# Patient Record
Sex: MALE | Race: WHITE | NOT HISPANIC OR LATINO | Employment: OTHER | URBAN - METROPOLITAN AREA
[De-identification: names, ages, dates, MRNs, and addresses within clinical notes are randomized per-mention and may not be internally consistent; named-entity substitution may affect disease eponyms.]

---

## 2019-07-19 ENCOUNTER — APPOINTMENT (OUTPATIENT)
Dept: SPEECH THERAPY | Facility: REHABILITATION | Age: 69
End: 2019-07-19
Payer: COMMERCIAL

## 2019-07-24 ENCOUNTER — APPOINTMENT (OUTPATIENT)
Dept: SPEECH THERAPY | Facility: REHABILITATION | Age: 69
End: 2019-07-24
Payer: COMMERCIAL

## 2019-08-14 ENCOUNTER — APPOINTMENT (OUTPATIENT)
Dept: SPEECH THERAPY | Facility: REHABILITATION | Age: 69
End: 2019-08-14
Payer: COMMERCIAL

## 2019-08-16 ENCOUNTER — APPOINTMENT (OUTPATIENT)
Dept: SPEECH THERAPY | Facility: REHABILITATION | Age: 69
End: 2019-08-16
Payer: COMMERCIAL

## 2020-07-17 ENCOUNTER — HOSPITAL ENCOUNTER (OUTPATIENT)
Facility: MEDICAL CENTER | Age: 70
DRG: 871 | End: 2020-07-17
Payer: MEDICARE

## 2020-07-17 ENCOUNTER — HOSPITAL ENCOUNTER (INPATIENT)
Facility: MEDICAL CENTER | Age: 70
LOS: 21 days | DRG: 871 | End: 2020-08-07
Payer: MEDICARE

## 2020-07-17 DIAGNOSIS — T17.800A ASPIRATION INTO LOWER RESPIRATORY TRACT, INITIAL ENCOUNTER: ICD-10-CM

## 2020-07-17 DIAGNOSIS — J96.01 ACUTE RESPIRATORY FAILURE WITH HYPOXIA (HCC): ICD-10-CM

## 2020-07-17 DIAGNOSIS — N17.9 AKI (ACUTE KIDNEY INJURY) (HCC): ICD-10-CM

## 2020-07-17 DIAGNOSIS — E87.20 LACTIC ACIDOSIS: ICD-10-CM

## 2020-07-17 PROBLEM — D72.829 LEUKOCYTOSIS: Status: ACTIVE | Noted: 2020-07-17

## 2020-07-17 PROBLEM — J96.90 RESPIRATORY FAILURE (HCC): Status: ACTIVE | Noted: 2020-07-17

## 2020-07-17 PROBLEM — K56.609 SBO (SMALL BOWEL OBSTRUCTION) (HCC): Status: ACTIVE | Noted: 2020-07-17

## 2020-07-17 LAB
ALBUMIN SERPL BCP-MCNC: 3.3 G/DL (ref 3.2–4.9)
ALBUMIN/GLOB SERPL: 1.1 G/DL
ALP SERPL-CCNC: 49 U/L (ref 30–99)
ALT SERPL-CCNC: 17 U/L (ref 2–50)
ANION GAP SERPL CALC-SCNC: 13 MMOL/L (ref 7–16)
APTT PPP: 32.5 SEC (ref 24.7–36)
AST SERPL-CCNC: 25 U/L (ref 12–45)
BASOPHILS # BLD AUTO: 0.2 % (ref 0–1.8)
BASOPHILS # BLD: 0.04 K/UL (ref 0–0.12)
BILIRUB SERPL-MCNC: 0.3 MG/DL (ref 0.1–1.5)
BUN SERPL-MCNC: 26 MG/DL (ref 8–22)
CALCIUM SERPL-MCNC: 8.8 MG/DL (ref 8.5–10.5)
CHLORIDE SERPL-SCNC: 102 MMOL/L (ref 96–112)
CO2 SERPL-SCNC: 25 MMOL/L (ref 20–33)
CORTIS SERPL-MCNC: 20.2 UG/DL (ref 0–23)
CREAT SERPL-MCNC: 1.36 MG/DL (ref 0.5–1.4)
EOSINOPHIL # BLD AUTO: 0.01 K/UL (ref 0–0.51)
EOSINOPHIL NFR BLD: 0.1 % (ref 0–6.9)
ERYTHROCYTE [DISTWIDTH] IN BLOOD BY AUTOMATED COUNT: 55.3 FL (ref 35.9–50)
GLOBULIN SER CALC-MCNC: 2.9 G/DL (ref 1.9–3.5)
GLUCOSE BLD-MCNC: 83 MG/DL (ref 65–99)
GLUCOSE SERPL-MCNC: 113 MG/DL (ref 65–99)
HCT VFR BLD AUTO: 43.7 % (ref 42–52)
HGB BLD-MCNC: 13.7 G/DL (ref 14–18)
IMM GRANULOCYTES # BLD AUTO: 0.2 K/UL (ref 0–0.11)
IMM GRANULOCYTES NFR BLD AUTO: 1 % (ref 0–0.9)
INR PPP: 1.19 (ref 0.87–1.13)
LACTATE BLD-SCNC: 2.7 MMOL/L (ref 0.5–2)
LYMPHOCYTES # BLD AUTO: 0.94 K/UL (ref 1–4.8)
LYMPHOCYTES NFR BLD: 4.8 % (ref 22–41)
MAGNESIUM SERPL-MCNC: 2.3 MG/DL (ref 1.5–2.5)
MCH RBC QN AUTO: 30 PG (ref 27–33)
MCHC RBC AUTO-ENTMCNC: 31.4 G/DL (ref 33.7–35.3)
MCV RBC AUTO: 95.8 FL (ref 81.4–97.8)
MONOCYTES # BLD AUTO: 0.75 K/UL (ref 0–0.85)
MONOCYTES NFR BLD AUTO: 3.8 % (ref 0–13.4)
NEUTROPHILS # BLD AUTO: 17.77 K/UL (ref 1.82–7.42)
NEUTROPHILS NFR BLD: 90.1 % (ref 44–72)
NRBC # BLD AUTO: 0 K/UL
NRBC BLD-RTO: 0 /100 WBC
NT-PROBNP SERPL IA-MCNC: 323 PG/ML (ref 0–125)
PLATELET # BLD AUTO: 205 K/UL (ref 164–446)
PMV BLD AUTO: 11.1 FL (ref 9–12.9)
POTASSIUM SERPL-SCNC: 4.5 MMOL/L (ref 3.6–5.5)
PROT SERPL-MCNC: 6.2 G/DL (ref 6–8.2)
PROTHROMBIN TIME: 15.5 SEC (ref 12–14.6)
RBC # BLD AUTO: 4.56 M/UL (ref 4.7–6.1)
SODIUM SERPL-SCNC: 140 MMOL/L (ref 135–145)
TROPONIN T SERPL-MCNC: 44 NG/L (ref 6–19)
TSH SERPL DL<=0.005 MIU/L-ACNC: 1.95 UIU/ML (ref 0.38–5.33)
WBC # BLD AUTO: 19.7 K/UL (ref 4.8–10.8)

## 2020-07-17 PROCEDURE — 700105 HCHG RX REV CODE 258: Performed by: INTERNAL MEDICINE

## 2020-07-17 PROCEDURE — 84484 ASSAY OF TROPONIN QUANT: CPT

## 2020-07-17 PROCEDURE — 87040 BLOOD CULTURE FOR BACTERIA: CPT | Mod: 91

## 2020-07-17 PROCEDURE — 83605 ASSAY OF LACTIC ACID: CPT

## 2020-07-17 PROCEDURE — 83735 ASSAY OF MAGNESIUM: CPT

## 2020-07-17 PROCEDURE — 85025 COMPLETE CBC W/AUTO DIFF WBC: CPT

## 2020-07-17 PROCEDURE — 87641 MR-STAPH DNA AMP PROBE: CPT

## 2020-07-17 PROCEDURE — 99291 CRITICAL CARE FIRST HOUR: CPT | Performed by: INTERNAL MEDICINE

## 2020-07-17 PROCEDURE — 80053 COMPREHEN METABOLIC PANEL: CPT

## 2020-07-17 PROCEDURE — 93005 ELECTROCARDIOGRAM TRACING: CPT | Performed by: INTERNAL MEDICINE

## 2020-07-17 PROCEDURE — 85610 PROTHROMBIN TIME: CPT

## 2020-07-17 PROCEDURE — 84145 PROCALCITONIN (PCT): CPT

## 2020-07-17 PROCEDURE — 83880 ASSAY OF NATRIURETIC PEPTIDE: CPT

## 2020-07-17 PROCEDURE — 700111 HCHG RX REV CODE 636 W/ 250 OVERRIDE (IP): Performed by: INTERNAL MEDICINE

## 2020-07-17 PROCEDURE — 770022 HCHG ROOM/CARE - ICU (200)

## 2020-07-17 PROCEDURE — 700102 HCHG RX REV CODE 250 W/ 637 OVERRIDE(OP): Performed by: INTERNAL MEDICINE

## 2020-07-17 PROCEDURE — 85730 THROMBOPLASTIN TIME PARTIAL: CPT

## 2020-07-17 PROCEDURE — 82962 GLUCOSE BLOOD TEST: CPT

## 2020-07-17 PROCEDURE — 84443 ASSAY THYROID STIM HORMONE: CPT

## 2020-07-17 PROCEDURE — 82533 TOTAL CORTISOL: CPT

## 2020-07-17 PROCEDURE — 94640 AIRWAY INHALATION TREATMENT: CPT

## 2020-07-17 RX ORDER — BISACODYL 10 MG
10 SUPPOSITORY, RECTAL RECTAL
Status: DISCONTINUED | OUTPATIENT
Start: 2020-07-17 | End: 2020-07-18

## 2020-07-17 RX ORDER — AMOXICILLIN 250 MG
2 CAPSULE ORAL 2 TIMES DAILY
Status: DISCONTINUED | OUTPATIENT
Start: 2020-07-17 | End: 2020-07-18

## 2020-07-17 RX ORDER — LABETALOL HYDROCHLORIDE 5 MG/ML
10 INJECTION, SOLUTION INTRAVENOUS EVERY 4 HOURS PRN
Status: DISCONTINUED | OUTPATIENT
Start: 2020-07-17 | End: 2020-08-07 | Stop reason: HOSPADM

## 2020-07-17 RX ORDER — ONDANSETRON 4 MG/1
4 TABLET, ORALLY DISINTEGRATING ORAL EVERY 4 HOURS PRN
Status: DISCONTINUED | OUTPATIENT
Start: 2020-07-17 | End: 2020-07-18

## 2020-07-17 RX ORDER — SODIUM CHLORIDE, SODIUM LACTATE, POTASSIUM CHLORIDE, CALCIUM CHLORIDE 600; 310; 30; 20 MG/100ML; MG/100ML; MG/100ML; MG/100ML
INJECTION, SOLUTION INTRAVENOUS CONTINUOUS
Status: DISCONTINUED | OUTPATIENT
Start: 2020-07-17 | End: 2020-07-19

## 2020-07-17 RX ORDER — DEXTROSE MONOHYDRATE 25 G/50ML
50 INJECTION, SOLUTION INTRAVENOUS
Status: DISCONTINUED | OUTPATIENT
Start: 2020-07-17 | End: 2020-07-28

## 2020-07-17 RX ORDER — POLYETHYLENE GLYCOL 3350 17 G/17G
1 POWDER, FOR SOLUTION ORAL
Status: DISCONTINUED | OUTPATIENT
Start: 2020-07-17 | End: 2020-07-18

## 2020-07-17 RX ORDER — ONDANSETRON 2 MG/ML
4 INJECTION INTRAMUSCULAR; INTRAVENOUS EVERY 4 HOURS PRN
Status: DISCONTINUED | OUTPATIENT
Start: 2020-07-17 | End: 2020-08-07 | Stop reason: HOSPADM

## 2020-07-17 RX ORDER — LINEZOLID 2 MG/ML
600 INJECTION, SOLUTION INTRAVENOUS EVERY 12 HOURS
Status: DISCONTINUED | OUTPATIENT
Start: 2020-07-17 | End: 2020-07-18

## 2020-07-17 RX ADMIN — SODIUM CHLORIDE, POTASSIUM CHLORIDE, SODIUM LACTATE AND CALCIUM CHLORIDE: 600; 310; 30; 20 INJECTION, SOLUTION INTRAVENOUS at 22:02

## 2020-07-17 RX ADMIN — LINEZOLID 600 MG: 600 INJECTION, SOLUTION INTRAVENOUS at 22:03

## 2020-07-17 RX ADMIN — PIPERACILLIN AND TAZOBACTAM 4.5 G: 4; .5 INJECTION, POWDER, LYOPHILIZED, FOR SOLUTION INTRAVENOUS; PARENTERAL at 22:01

## 2020-07-17 ASSESSMENT — ENCOUNTER SYMPTOMS
NAUSEA: 0
PALPITATIONS: 0
FEVER: 0
SPUTUM PRODUCTION: 0
ABDOMINAL PAIN: 0
CHILLS: 0
SHORTNESS OF BREATH: 1
DEPRESSION: 0
FOCAL WEAKNESS: 0
VOMITING: 0
HEADACHES: 0
COUGH: 1

## 2020-07-17 NOTE — PROGRESS NOTES
Received direct admit transfer request from [facility].  Sending Physician: Dr. Hancock  Specialist consulted: Dr. Doll  Diagnosis: Sepsis  Patient accepted by: Dr. Doll  Patient coming via: [facility]  ETA: TBD pending bed availibility  Nursing to notify Direct Admit On-Call Hospitalist and release orders when patient arrives.
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
## 2020-07-18 ENCOUNTER — APPOINTMENT (OUTPATIENT)
Dept: RADIOLOGY | Facility: MEDICAL CENTER | Age: 70
DRG: 871 | End: 2020-07-18
Attending: INTERNAL MEDICINE
Payer: MEDICARE

## 2020-07-18 PROBLEM — J96.01 ACUTE RESPIRATORY FAILURE WITH HYPOXIA (HCC): Status: ACTIVE | Noted: 2020-07-17

## 2020-07-18 LAB
ANION GAP SERPL CALC-SCNC: 9 MMOL/L (ref 7–16)
BASOPHILS # BLD AUTO: 0.3 % (ref 0–1.8)
BASOPHILS # BLD: 0.05 K/UL (ref 0–0.12)
BUN SERPL-MCNC: 18 MG/DL (ref 8–22)
CALCIUM SERPL-MCNC: 8.8 MG/DL (ref 8.5–10.5)
CHLORIDE SERPL-SCNC: 104 MMOL/L (ref 96–112)
CO2 SERPL-SCNC: 25 MMOL/L (ref 20–33)
COVID ORDER STATUS COVID19: NORMAL
CREAT SERPL-MCNC: 1 MG/DL (ref 0.5–1.4)
EKG IMPRESSION: NORMAL
EOSINOPHIL # BLD AUTO: 0.11 K/UL (ref 0–0.51)
EOSINOPHIL NFR BLD: 0.7 % (ref 0–6.9)
ERYTHROCYTE [DISTWIDTH] IN BLOOD BY AUTOMATED COUNT: 55 FL (ref 35.9–50)
GLUCOSE BLD-MCNC: 102 MG/DL (ref 65–99)
GLUCOSE BLD-MCNC: 76 MG/DL (ref 65–99)
GLUCOSE SERPL-MCNC: 84 MG/DL (ref 65–99)
GRAM STN SPEC: NORMAL
HCT VFR BLD AUTO: 42.4 % (ref 42–52)
HGB BLD-MCNC: 13.1 G/DL (ref 14–18)
IMM GRANULOCYTES # BLD AUTO: 0.08 K/UL (ref 0–0.11)
IMM GRANULOCYTES NFR BLD AUTO: 0.5 % (ref 0–0.9)
LYMPHOCYTES # BLD AUTO: 1.03 K/UL (ref 1–4.8)
LYMPHOCYTES NFR BLD: 7 % (ref 22–41)
MCH RBC QN AUTO: 29.5 PG (ref 27–33)
MCHC RBC AUTO-ENTMCNC: 30.9 G/DL (ref 33.7–35.3)
MCV RBC AUTO: 95.5 FL (ref 81.4–97.8)
MONOCYTES # BLD AUTO: 0.57 K/UL (ref 0–0.85)
MONOCYTES NFR BLD AUTO: 3.9 % (ref 0–13.4)
MRSA DNA SPEC QL NAA+PROBE: NORMAL
NEUTROPHILS # BLD AUTO: 12.9 K/UL (ref 1.82–7.42)
NEUTROPHILS NFR BLD: 87.6 % (ref 44–72)
NRBC # BLD AUTO: 0 K/UL
NRBC BLD-RTO: 0 /100 WBC
PLATELET # BLD AUTO: 194 K/UL (ref 164–446)
PMV BLD AUTO: 10.7 FL (ref 9–12.9)
POTASSIUM SERPL-SCNC: 4.1 MMOL/L (ref 3.6–5.5)
PROCALCITONIN SERPL-MCNC: 0.81 NG/ML
RBC # BLD AUTO: 4.44 M/UL (ref 4.7–6.1)
SARS-COV-2 RNA RESP QL NAA+PROBE: NOTDETECTED
SIGNIFICANT IND 70042: NORMAL
SIGNIFICANT IND 70042: NORMAL
SITE SITE: NORMAL
SITE SITE: NORMAL
SODIUM SERPL-SCNC: 138 MMOL/L (ref 135–145)
SOURCE SOURCE: NORMAL
SOURCE SOURCE: NORMAL
SPECIMEN SOURCE: NORMAL
WBC # BLD AUTO: 14.7 K/UL (ref 4.8–10.8)

## 2020-07-18 PROCEDURE — 93010 ELECTROCARDIOGRAM REPORT: CPT | Performed by: INTERNAL MEDICINE

## 2020-07-18 PROCEDURE — 87205 SMEAR GRAM STAIN: CPT

## 2020-07-18 PROCEDURE — C9803 HOPD COVID-19 SPEC COLLECT: HCPCS | Performed by: INTERNAL MEDICINE

## 2020-07-18 PROCEDURE — 700111 HCHG RX REV CODE 636 W/ 250 OVERRIDE (IP): Performed by: INTERNAL MEDICINE

## 2020-07-18 PROCEDURE — 87070 CULTURE OTHR SPECIMN AEROBIC: CPT

## 2020-07-18 PROCEDURE — 700105 HCHG RX REV CODE 258: Performed by: INTERNAL MEDICINE

## 2020-07-18 PROCEDURE — 99291 CRITICAL CARE FIRST HOUR: CPT | Performed by: INTERNAL MEDICINE

## 2020-07-18 PROCEDURE — 770022 HCHG ROOM/CARE - ICU (200)

## 2020-07-18 PROCEDURE — U0003 INFECTIOUS AGENT DETECTION BY NUCLEIC ACID (DNA OR RNA); SEVERE ACUTE RESPIRATORY SYNDROME CORONAVIRUS 2 (SARS-COV-2) (CORONAVIRUS DISEASE [COVID-19]), AMPLIFIED PROBE TECHNIQUE, MAKING USE OF HIGH THROUGHPUT TECHNOLOGIES AS DESCRIBED BY CMS-2020-01-R: HCPCS

## 2020-07-18 PROCEDURE — 71045 X-RAY EXAM CHEST 1 VIEW: CPT

## 2020-07-18 PROCEDURE — 94669 MECHANICAL CHEST WALL OSCILL: CPT

## 2020-07-18 PROCEDURE — 82962 GLUCOSE BLOOD TEST: CPT

## 2020-07-18 PROCEDURE — 80048 BASIC METABOLIC PNL TOTAL CA: CPT

## 2020-07-18 PROCEDURE — 85025 COMPLETE CBC W/AUTO DIFF WBC: CPT

## 2020-07-18 PROCEDURE — 94640 AIRWAY INHALATION TREATMENT: CPT

## 2020-07-18 PROCEDURE — 700101 HCHG RX REV CODE 250: Performed by: INTERNAL MEDICINE

## 2020-07-18 RX ORDER — POLYETHYLENE GLYCOL 3350 17 G/17G
1 POWDER, FOR SOLUTION ORAL
Status: DISCONTINUED | OUTPATIENT
Start: 2020-07-18 | End: 2020-07-21

## 2020-07-18 RX ORDER — AMOXICILLIN 250 MG
2 CAPSULE ORAL 2 TIMES DAILY
Status: DISCONTINUED | OUTPATIENT
Start: 2020-07-18 | End: 2020-07-21

## 2020-07-18 RX ORDER — ONDANSETRON 4 MG/1
4 TABLET, ORALLY DISINTEGRATING ORAL EVERY 4 HOURS PRN
Status: DISCONTINUED | OUTPATIENT
Start: 2020-07-18 | End: 2020-07-23

## 2020-07-18 RX ORDER — HEPARIN SODIUM 5000 [USP'U]/ML
5000 INJECTION, SOLUTION INTRAVENOUS; SUBCUTANEOUS EVERY 8 HOURS
Status: DISCONTINUED | OUTPATIENT
Start: 2020-07-18 | End: 2020-07-26

## 2020-07-18 RX ORDER — BISACODYL 10 MG
10 SUPPOSITORY, RECTAL RECTAL
Status: DISCONTINUED | OUTPATIENT
Start: 2020-07-18 | End: 2020-07-21

## 2020-07-18 RX ADMIN — PIPERACILLIN AND TAZOBACTAM 4.5 G: 4; .5 INJECTION, POWDER, LYOPHILIZED, FOR SOLUTION INTRAVENOUS; PARENTERAL at 05:24

## 2020-07-18 RX ADMIN — DEXTROSE MONOHYDRATE 50 ML: 25 INJECTION, SOLUTION INTRAVENOUS at 23:32

## 2020-07-18 RX ADMIN — PIPERACILLIN AND TAZOBACTAM 4.5 G: 4; .5 INJECTION, POWDER, LYOPHILIZED, FOR SOLUTION INTRAVENOUS; PARENTERAL at 20:01

## 2020-07-18 RX ADMIN — SODIUM CHLORIDE, POTASSIUM CHLORIDE, SODIUM LACTATE AND CALCIUM CHLORIDE: 600; 310; 30; 20 INJECTION, SOLUTION INTRAVENOUS at 11:01

## 2020-07-18 RX ADMIN — PIPERACILLIN AND TAZOBACTAM 4.5 G: 4; .5 INJECTION, POWDER, LYOPHILIZED, FOR SOLUTION INTRAVENOUS; PARENTERAL at 13:02

## 2020-07-18 RX ADMIN — SODIUM CHLORIDE, POTASSIUM CHLORIDE, SODIUM LACTATE AND CALCIUM CHLORIDE: 600; 310; 30; 20 INJECTION, SOLUTION INTRAVENOUS at 23:46

## 2020-07-18 RX ADMIN — LINEZOLID 600 MG: 600 INJECTION, SOLUTION INTRAVENOUS at 05:23

## 2020-07-18 RX ADMIN — DEXTROSE MONOHYDRATE 50 ML: 25 INJECTION, SOLUTION INTRAVENOUS at 12:48

## 2020-07-18 RX ADMIN — HEPARIN SODIUM 5000 UNITS: 5000 INJECTION, SOLUTION INTRAVENOUS; SUBCUTANEOUS at 13:02

## 2020-07-18 RX ADMIN — PIPERACILLIN AND TAZOBACTAM 4.5 G: 4; .5 INJECTION, POWDER, LYOPHILIZED, FOR SOLUTION INTRAVENOUS; PARENTERAL at 01:00

## 2020-07-18 RX ADMIN — HEPARIN SODIUM 5000 UNITS: 5000 INJECTION, SOLUTION INTRAVENOUS; SUBCUTANEOUS at 21:32

## 2020-07-18 ASSESSMENT — COPD QUESTIONNAIRES
COPD SCREENING SCORE: 5
HAVE YOU SMOKED AT LEAST 100 CIGARETTES IN YOUR ENTIRE LIFE: YES
DURING THE PAST 4 WEEKS HOW MUCH DID YOU FEEL SHORT OF BREATH: SOME OF THE TIME
DO YOU EVER COUGH UP ANY MUCUS OR PHLEGM?: NO/ONLY WITH OCCASIONAL COLDS OR INFECTIONS

## 2020-07-18 ASSESSMENT — ENCOUNTER SYMPTOMS
NAUSEA: 0
CHILLS: 0
FEVER: 0
WEAKNESS: 1
ABDOMINAL PAIN: 0
SHORTNESS OF BREATH: 1
VOMITING: 0

## 2020-07-18 ASSESSMENT — LIFESTYLE VARIABLES: EVER_SMOKED: YES
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
## 2020-07-18 NOTE — DIETARY
"Nutrition Support Assessment:  Day 1 of admit.  Johann Godoy is a 69 y.o. male with admitting DX of sepsis.     Current problem list:  1. Respiratory failure  2. Leukocytosis  3. KORIN (acute kidney injury)  4. Lactic acidosis  5. SBO (small bowel obstruction)     Assessment:  Estimated Nutritional Needs based on:   Height: 188 cm (6' 2\")  Weight: 91.5 kg (201 lb 11.5 oz)  Weight to Use in Calculations: 91.5 kg (201 lb 11.5 oz) - admit bed scale wt  Ideal Body Weight: 86.2 kg (190 lb)  Percent Ideal Body Weight: 106.2  Body mass index is 25.9 kg/m²., BMI classification: overweight    Calculation/Equation: MSJ x 1.2 = 2096 kcals/day  Total Calories / day: 2096 - 2288 (Calories / k - 25)  Total Grams Protein / day: 110 - 128 (Grams Protein / k.2 - 1.4)     Evaluation:   1. Transferred from outside facility with pneumonia and sepsis.  2. CT of abdomen/pelvis showed partial SBO.  3. Reported hx of CHF.  4. NGT to low intermittent suction.  5. TF consult received, awaiting cortrak placement and verification.  6. Glucose 113, BUN 26  7. Zosyn, Pericolace on the MAR.  8. Specialized, low volume tube feeding formula indicated to meet pt's estimated nutrition needs.     Malnutrition Risk: Unable to identify at this time.     Recommendations/Plan:  1. Once Cortrak placed and verified, start Impact Peptide 1.5 @ 25 mL/hr and advance per protocol to goal rate of 55 mL/hr, which provides 1980 kcals, 124 grams of protein and 1016 mL of free water per day.  2. Fluids per MD.  3. PO diet when safe/appropriate per SLP/MD and pt can adequately consume.    RD following.              "

## 2020-07-18 NOTE — ASSESSMENT & PLAN NOTE
Due to aspiration pneumonia/pneumonitis  CTA from OSH per report no PE, BB ATX versus infiltrate  Component of atelectasis on x-ray as well    7/18 COVID SARS CoV2 PCR negative  7/18 Sputum Cx gram stain GPC, Cx pending  7/20 started on scopolamine patch due to increased oral secretions  7/21 intubated, bronched - large amount purulent secretions  7/22 self-extubated  Since he has been on HFNC 100%, 60L/min    Plan;   Continue HFNC, keep sat >88% -> trial of Bipap 7/24-now off PIP, work of breathing and oxygenation improved  Transition to oxygen mask or nasal cannula when clinically appropriate  CTA chest reviewed, Echo with bubble study  DNR/DNI  Mobility, PT following  CT with dense RLL consolidation s/p PNA treatment -7 days Zosyn-bleed 7/24  Continue Hypertonic saline 7% and DuoNeb 4 times daily and as needed  Mobilize- incentive spirometry as oxygenation allows  Continue mucolytic's  Son Taurus discussing with family to come see him/face time since his dad would not want this level of care.

## 2020-07-18 NOTE — PROGRESS NOTES
2 RN skin check complete with Mary RENNER RN.   Devices in place two IVs, BP cuff, EKG leads, pulse ox, HFNC , non skid socks.  Skin assessed under devices. Blanchable redness on right elbow. Redness on bilateral ears from oxygen tubing (present on arrival), skin excoriation on bilateral buttocks with redness that is slow to catherine, right heel cracked and calloused, scabs present on shins.   The following interventions in place waffle mattress overlay, q2h turns, pillows in place to float heels and elbows, condom cath placed. Photos uploaded.

## 2020-07-18 NOTE — ASSESSMENT & PLAN NOTE
Creatinine normalized, continue serial BMP, normalized with time/hydration  CT abdomen pelvis from OSH did not mention hydronephrosis or obstruction.   Monitor urine output  Avoid nephrotoxins  Hep-Lock IV fluids

## 2020-07-18 NOTE — ASSESSMENT & PLAN NOTE
7/16 CT abdomen pelvis at OSH was personally reviewed and noted diffuse dilated small bowel.   Since admisison, abdomen has been soft, while on NG tube.   7/20 Started on tube feed trickle feed, tolerating at 10cc/hr.   7/21 increased tube feed to 20cc/hr -> stop 7/22    Plan:   Given he's very tenous with resp status, will hold tube feed for now -> patient removed cortrax -> restart TF 7/24   Patient pulled cortrak again late 7/26, replaced 7/27  Continue to discuss with family goals of care, patient is capable of discussing issues as well  Abdominal exam remains soft no hernia or tenderness    Check KUB 7/25 -> constipated  Daily suppository aggressive bowel regime  Mobilize as oxygenation allows, may need to put mask on top of high flow nasal cannula to facilitate  Starting p.o. 7/28, tolerating thickened liquids without GI symptoms

## 2020-07-18 NOTE — PROGRESS NOTES
Pt up to [facility] by transport team at 1915. Pt connected to monitor and MD Dominguez updated. Pt on 15L NRB satting 91%.

## 2020-07-18 NOTE — ASSESSMENT & PLAN NOTE
Multifactorial possible sepsis +/- respiratory distress +/- any transient ischemia from reported inguinal hernia   Resolved  Monitor for recurrence
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
## 2020-07-18 NOTE — ASSESSMENT & PLAN NOTE
?  Reactive VS inflammatory VS infectious VS demargination  Panculture  Broad-spectrum empiric antibiotics  Trend  Currently BP and HR WNL, afebrile, with hypoxic respiratory failure and concern of aspiration

## 2020-07-19 ENCOUNTER — HOSPITAL ENCOUNTER (OUTPATIENT)
Dept: RADIOLOGY | Facility: MEDICAL CENTER | Age: 70
End: 2020-07-19
Payer: MEDICARE

## 2020-07-19 ENCOUNTER — APPOINTMENT (OUTPATIENT)
Dept: RADIOLOGY | Facility: MEDICAL CENTER | Age: 70
DRG: 871 | End: 2020-07-19
Attending: INTERNAL MEDICINE
Payer: MEDICARE

## 2020-07-19 PROBLEM — Z71.89 GOALS OF CARE, COUNSELING/DISCUSSION: Status: ACTIVE | Noted: 2020-07-19

## 2020-07-19 LAB
ANION GAP SERPL CALC-SCNC: 10 MMOL/L (ref 7–16)
BASOPHILS # BLD AUTO: 0.4 % (ref 0–1.8)
BASOPHILS # BLD: 0.05 K/UL (ref 0–0.12)
BUN SERPL-MCNC: 16 MG/DL (ref 8–22)
CALCIUM SERPL-MCNC: 8.9 MG/DL (ref 8.5–10.5)
CHLORIDE SERPL-SCNC: 102 MMOL/L (ref 96–112)
CO2 SERPL-SCNC: 26 MMOL/L (ref 20–33)
CREAT SERPL-MCNC: 0.97 MG/DL (ref 0.5–1.4)
CRP SERPL HS-MCNC: 14.63 MG/DL (ref 0–0.75)
EOSINOPHIL # BLD AUTO: 0.19 K/UL (ref 0–0.51)
EOSINOPHIL NFR BLD: 1.5 % (ref 0–6.9)
ERYTHROCYTE [DISTWIDTH] IN BLOOD BY AUTOMATED COUNT: 53 FL (ref 35.9–50)
GLUCOSE BLD-MCNC: 103 MG/DL (ref 65–99)
GLUCOSE BLD-MCNC: 109 MG/DL (ref 65–99)
GLUCOSE BLD-MCNC: 116 MG/DL (ref 65–99)
GLUCOSE BLD-MCNC: 134 MG/DL (ref 65–99)
GLUCOSE BLD-MCNC: 142 MG/DL (ref 65–99)
GLUCOSE BLD-MCNC: 62 MG/DL (ref 65–99)
GLUCOSE BLD-MCNC: 65 MG/DL (ref 65–99)
GLUCOSE BLD-MCNC: 67 MG/DL (ref 65–99)
GLUCOSE BLD-MCNC: 75 MG/DL (ref 65–99)
GLUCOSE BLD-MCNC: 77 MG/DL (ref 65–99)
GLUCOSE BLD-MCNC: 81 MG/DL (ref 65–99)
GLUCOSE BLD-MCNC: 83 MG/DL (ref 65–99)
GLUCOSE BLD-MCNC: 87 MG/DL (ref 65–99)
GLUCOSE BLD-MCNC: 99 MG/DL (ref 65–99)
GLUCOSE SERPL-MCNC: 78 MG/DL (ref 65–99)
HCT VFR BLD AUTO: 40.9 % (ref 42–52)
HGB BLD-MCNC: 12.7 G/DL (ref 14–18)
IMM GRANULOCYTES # BLD AUTO: 0.06 K/UL (ref 0–0.11)
IMM GRANULOCYTES NFR BLD AUTO: 0.5 % (ref 0–0.9)
LACTATE BLD-SCNC: 0.8 MMOL/L (ref 0.5–2)
LYMPHOCYTES # BLD AUTO: 1.34 K/UL (ref 1–4.8)
LYMPHOCYTES NFR BLD: 10.7 % (ref 22–41)
MCH RBC QN AUTO: 29.4 PG (ref 27–33)
MCHC RBC AUTO-ENTMCNC: 31.1 G/DL (ref 33.7–35.3)
MCV RBC AUTO: 94.7 FL (ref 81.4–97.8)
MONOCYTES # BLD AUTO: 0.62 K/UL (ref 0–0.85)
MONOCYTES NFR BLD AUTO: 5 % (ref 0–13.4)
NEUTROPHILS # BLD AUTO: 10.22 K/UL (ref 1.82–7.42)
NEUTROPHILS NFR BLD: 81.9 % (ref 44–72)
NRBC # BLD AUTO: 0 K/UL
NRBC BLD-RTO: 0 /100 WBC
PLATELET # BLD AUTO: 191 K/UL (ref 164–446)
PMV BLD AUTO: 11 FL (ref 9–12.9)
POTASSIUM SERPL-SCNC: 4 MMOL/L (ref 3.6–5.5)
PREALB SERPL-MCNC: 9.2 MG/DL (ref 18–38)
RBC # BLD AUTO: 4.32 M/UL (ref 4.7–6.1)
SODIUM SERPL-SCNC: 138 MMOL/L (ref 135–145)
WBC # BLD AUTO: 12.5 K/UL (ref 4.8–10.8)

## 2020-07-19 PROCEDURE — 82962 GLUCOSE BLOOD TEST: CPT | Mod: 91

## 2020-07-19 PROCEDURE — 700111 HCHG RX REV CODE 636 W/ 250 OVERRIDE (IP): Performed by: INTERNAL MEDICINE

## 2020-07-19 PROCEDURE — 85025 COMPLETE CBC W/AUTO DIFF WBC: CPT

## 2020-07-19 PROCEDURE — 80048 BASIC METABOLIC PNL TOTAL CA: CPT

## 2020-07-19 PROCEDURE — 700105 HCHG RX REV CODE 258: Performed by: INTERNAL MEDICINE

## 2020-07-19 PROCEDURE — 83605 ASSAY OF LACTIC ACID: CPT

## 2020-07-19 PROCEDURE — 99291 CRITICAL CARE FIRST HOUR: CPT | Performed by: INTERNAL MEDICINE

## 2020-07-19 PROCEDURE — 94640 AIRWAY INHALATION TREATMENT: CPT

## 2020-07-19 PROCEDURE — 84134 ASSAY OF PREALBUMIN: CPT

## 2020-07-19 PROCEDURE — 94669 MECHANICAL CHEST WALL OSCILL: CPT

## 2020-07-19 PROCEDURE — 770022 HCHG ROOM/CARE - ICU (200)

## 2020-07-19 PROCEDURE — 86140 C-REACTIVE PROTEIN: CPT

## 2020-07-19 PROCEDURE — 700101 HCHG RX REV CODE 250: Performed by: INTERNAL MEDICINE

## 2020-07-19 RX ORDER — FUROSEMIDE 10 MG/ML
20 INJECTION INTRAMUSCULAR; INTRAVENOUS EVERY 12 HOURS
Status: DISCONTINUED | OUTPATIENT
Start: 2020-07-19 | End: 2020-07-19

## 2020-07-19 RX ORDER — FUROSEMIDE 10 MG/ML
20 INJECTION INTRAMUSCULAR; INTRAVENOUS EVERY 8 HOURS
Status: DISCONTINUED | OUTPATIENT
Start: 2020-07-19 | End: 2020-07-22

## 2020-07-19 RX ADMIN — PIPERACILLIN AND TAZOBACTAM 4.5 G: 4; .5 INJECTION, POWDER, LYOPHILIZED, FOR SOLUTION INTRAVENOUS; PARENTERAL at 05:29

## 2020-07-19 RX ADMIN — DEXTROSE MONOHYDRATE 50 ML: 25 INJECTION, SOLUTION INTRAVENOUS at 23:54

## 2020-07-19 RX ADMIN — HEPARIN SODIUM 5000 UNITS: 5000 INJECTION, SOLUTION INTRAVENOUS; SUBCUTANEOUS at 05:30

## 2020-07-19 RX ADMIN — DEXTROSE MONOHYDRATE 50 ML: 25 INJECTION, SOLUTION INTRAVENOUS at 05:25

## 2020-07-19 RX ADMIN — PIPERACILLIN AND TAZOBACTAM 4.5 G: 4; .5 INJECTION, POWDER, LYOPHILIZED, FOR SOLUTION INTRAVENOUS; PARENTERAL at 21:28

## 2020-07-19 RX ADMIN — FUROSEMIDE 20 MG: 10 INJECTION, SOLUTION INTRAMUSCULAR; INTRAVENOUS at 09:14

## 2020-07-19 RX ADMIN — FUROSEMIDE 20 MG: 10 INJECTION, SOLUTION INTRAMUSCULAR; INTRAVENOUS at 15:26

## 2020-07-19 RX ADMIN — PIPERACILLIN AND TAZOBACTAM 4.5 G: 4; .5 INJECTION, POWDER, LYOPHILIZED, FOR SOLUTION INTRAVENOUS; PARENTERAL at 12:24

## 2020-07-19 RX ADMIN — HEPARIN SODIUM 5000 UNITS: 5000 INJECTION, SOLUTION INTRAVENOUS; SUBCUTANEOUS at 15:26

## 2020-07-19 RX ADMIN — HEPARIN SODIUM 5000 UNITS: 5000 INJECTION, SOLUTION INTRAVENOUS; SUBCUTANEOUS at 21:28

## 2020-07-19 RX ADMIN — FUROSEMIDE 20 MG: 10 INJECTION, SOLUTION INTRAMUSCULAR; INTRAVENOUS at 21:28

## 2020-07-19 ASSESSMENT — ENCOUNTER SYMPTOMS
VOMITING: 0
WEAKNESS: 1
CHILLS: 0
SHORTNESS OF BREATH: 0
ABDOMINAL PAIN: 0
DIARRHEA: 0
NAUSEA: 0
FEVER: 0

## 2020-07-19 NOTE — ASSESSMENT & PLAN NOTE
Palliative care following  Son Taurus describes his dad typically avoid health care and wouldn't any of this current level of care current getting family to come in and say goodbye vs trying to get back to [facility] and then plan towards focusing on his comfort   DNR/DNI  If he was to worsen Taurus would like to transitioning to comfort measures  Family conference 7/28 father and son  SANTO completed/signed
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
## 2020-07-19 NOTE — CARE PLAN
Problem: Safety  Goal: Will remain free from injury  Note: Call light within reach, treaded socks in place, bed in lowest position and locked.  Hourly rounding in progress      Problem: Urinary Elimination:  Goal: Ability to reestablish a normal urinary elimination pattern will improve  Flowsheets (Taken 7/18/2020 2000)  Urinary Elimination: Incontinence  Note: Pt in continent. Condom cath in place. Wet linens changed

## 2020-07-19 NOTE — WOUND TEAM
[facility]  Inpatient Services  Initial Wound and Skin Care Evaluation    Admission Date: 7/17/2020     Last order of IP CONSULT TO WOUND CARE was found on 7/17/2020 from Hospital Encounter on 7/17/2020     HPI, PMH, SH: Reviewed    Unit where seen by Wound Team: [id number]     WOUND CONSULT/FOLLOW UP RELATED TO:  Bilateral ears, Bilateral heels, right elbow, penis, back and sacrum     Self Report / Pain Level:  Denies pain       OBJECTIVE:  Pt lying in ICU low airloss bed with waffle overlay in use. Pillows in use to offload pressure, heel mepilex to bilateral elbows. High flow tubing offloaded off of ears and at side of ears with square gray foam pads.     WOUND TYPE, LOCATION, CHARACTERISTICS (Pressure Injuries: location, stage, POA or date identified)  Wound 07/18/20 Pressure Injury Ear Right (Active)   Wound Image   07/19/20 1400   Site Assessment Red;Brown;Purple 07/19/20 1400   Periwound Assessment [place] 07/19/20 1400   Margins Attached edges;Defined edges 07/19/20 1400   Closure Adhesive bandage 07/19/20 1400   Drainage Amount None 07/19/20 1400   Treatments Cleansed;Site care;Offloading 07/19/20 1400   Wound Cleansing Normal Saline Irrigation 07/19/20 1400   Periwound Protectant Skin Protectant Wipes to Periwound 07/19/20 1400   Dressing Cleansing/Solutions Not Applicable 07/19/20 1400   Dressing Options Hydrocolloid Thin;Mepilex 07/19/20 1400   Dressing Changed New 07/19/20 1400   Dressing Status Clean;Dry;Intact 07/19/20 1400   Dressing Change/Treatment Frequency Every 72 hrs, and As Needed 07/19/20 1400   NEXT Dressing Change/Treatment Date 07/22/20 07/19/20 1400   NEXT Weekly Photo (Inpatient Only) 07/26/20 07/19/20 1400   Pressure Injury Stage DTPI 07/19/20 1400   Non-staged Wound Description Not applicable 07/19/20 1400   Wound Length (cm) 0.5 cm 07/19/20 1400   Wound Width (cm) 0.2 cm 07/19/20 1400   Wound Surface Area (cm^2) 0.1 cm^2 07/19/20 1400   Shape Circular 07/19/20 1400   Wound  Odor None 07/19/20 1400   Exposed Structures EMMA 07/19/20 1400   WOUND NURSE ONLY - Time Spent with Patient (mins) 75 07/19/20 1400   Number of days: 1       Wound 07/18/20 Pressure Injury Ear Left (Active)   Wound Image    07/19/20 1400   Site Assessment Red;Yellow 07/19/20 1400   Periwound Assessment Clean;Dry;Intact;Pink 07/19/20 1400   Margins Attached edges;Defined edges 07/19/20 1400   Closure Adhesive bandage 07/19/20 1400   Drainage Amount None 07/19/20 1400   Treatments Cleansed;Offloading;Site care 07/19/20 1400   Wound Cleansing Normal Saline Irrigation 07/19/20 1400   Periwound Protectant Skin Protectant Wipes to Periwound 07/19/20 1400   Dressing Cleansing/Solutions Not Applicable 07/19/20 1400   Dressing Options Hydrocolloid Thin;Mepilex 07/19/20 1400   Dressing Changed New 07/19/20 1400   Dressing Status Clean;Dry;Intact 07/19/20 1400   Dressing Change/Treatment Frequency Every 72 hrs, and As Needed 07/19/20 1400   NEXT Dressing Change/Treatment Date 07/22/20 07/19/20 1400   NEXT Weekly Photo (Inpatient Only) 07/26/20 07/19/20 1400   Pressure Injury Stage U 07/19/20 1400   Non-staged Wound Description Not applicable 07/19/20 1400   Wound Length (cm) 0.2 cm 07/19/20 1400   Wound Width (cm) 0.3 cm 07/19/20 1400   Wound Surface Area (cm^2) 0.06 cm^2 07/19/20 1400   Shape Circular 07/19/20 1400   Wound Odor None 07/19/20 1400   Exposed Structures EMMA 07/19/20 1400   Number of days: 1      Vascular:    HARDIK:   No results found.    Lab Values:    Lab Results   Component Value Date/Time    WBC 12.5 (H) 07/19/2020 04:30 AM    RBC 4.32 (L) 07/19/2020 04:30 AM    HEMOGLOBIN 12.7 (L) 07/19/2020 04:30 AM    HEMATOCRIT 40.9 (L) 07/19/2020 04:30 AM    CREACTPROT 14.63 (H) 07/19/2020 04:30 AM      Culture Results show:  No results found for this or any previous visit (from the past 720 hour(s)).    INTERVENTIONS BY WOUND TEAM:  Chart and images reviewed. This RN initially in to assess patient. Bedside RN and CIC tech  at bedside after just completing a bed bath. Pt refused further assessment at this time. This RN was able to assess pts penis which is red related to moisture and condom cath however no wounds are visible. Bedside RN obtained image and condom cath was applied. This RN then returned 2 hours later for assessment. Pt was agreeable to assessment. Bilateral ears assessed, measured and photographed. [place]ola-wounds were prepped with no sting skin barrier. A piece of thin hydrocolloid was applied to each would bed which was then secured in place with a thin strip of sacral mepilex. Bilateral cheeks already offloaded under high flow with mepilex and square gray foams already in use to high flow strap. Bilateral elbows then assessed. Right elbow has scar tissue which is quick to catherine with a small partial thickness wound visible. Pts bilateral heels then assessed, pt has a large callus to the right heel. A portion was peeled off. New images of the areas obtained. Heel mepilex to be changed every 72hrs. Bilateral toenails also photographed. Right toes have vianney horn like toenails and a consult for nail care was placed. This RN then attempted to assess backside as images of sacrum and back have been placed into chart. Pt refused assessment at this time. Per chart review no wounds are visible and pressure does not appear to be a factor but can't be excluded unless pt will allow assessment.     Interdisciplinary consultation: Patient, Bedside RN (Praveena),     EVALUATION: Pt is an older gentleman with a history of stroke and recent diagnosis of dementia. Pt was transferred from outside facility due to worsening pneumonia. Wound team was consulted for numerous areas of concern including bilateral ears which appear to have POA pressure injuries related to O2 tubing, thin hydrocolloid applied to provide moisture to the wound beds. Pt has dry callused heels which are intact. pts right elbow with large area of scar tissue, heel mepilex  applied to these areas to offload pressure. Penis is slightly red related to incontinence and condom caths. Pt refused assessment of sacrum and back. Sacral mepilex vs. Barrier paste should be used to prevent skin breakdown. Offloading measures are already in place. Nail care consult placed as pt has vianney horn nails to R foot.    Goals: Steady decrease in wound area and depth weekly.    NURSING PLAN OF CARE ORDERS (X):    Dressing changes: See Dressing Care orders: X  Skin care: See Skin Care orders: X  Rectal tube care: See Rectal Tube Care orders:   Other orders:    RSKIN:   CURRENTLY IN PLACE (X), APPLIED THIS VISIT (A), ORDERED (O):   Q shift Douglas:  X  Q shift pressure point assessments:  X  Pressure redistribution mattress            Low Airloss   X, ICU Bed       Bariatric AMELIA         Bariatric foam           Heel float boots     Heel Silicone dressing  O for heels, in use to elbows      Float Heels off Bed with Pillows  X             Barrier wipes         Barrier Cream         Barrier paste  X        Sacral silicone dressing X        Silicone O2 tubing  NA high flow        Anchorfast         Cannula fixation Device (Tender )          Gray Foam Ear protectors  X   High flow offloading Clip    Elastic head band offloading device                                                      Trach with Optifoam split foam       Z Ovidio Pillow                             Waffle cushion        Waffle Overlay   X      Rectal tube or BMS    Purwick/Condom Cath X         Antifungal tx      Interdry          Reposition q 2 hours  X    TAPs Turning system                 Up to chair        Ambulate      PT/OT        Dietician        Diabetes Education      PO     TF     TPN     NPO X  # days   Other        WOUND TEAM PLAN OF CARE:   Dressing changes by wound team:                   Follow up 3 times weekly:                NPWT change 3 times weekly:     Follow up 1-2 times weekly:      Follow up Bi-Monthly:                    Follow up as needed:  X     Other (explain):     Anticipated discharge plans:   LTACH:        SNF/Rehab:  X, pt came from an assisted living facility.             Home Health Care:           Outpatient Wound Center:            Self Care:
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
## 2020-07-19 NOTE — PROGRESS NOTES
Midnight blood sugar check was 62. D50w given per MAR.  !5 minute recheck fsbg 134. Will re check in one hour

## 2020-07-20 ENCOUNTER — APPOINTMENT (OUTPATIENT)
Dept: RADIOLOGY | Facility: MEDICAL CENTER | Age: 70
DRG: 871 | End: 2020-07-20
Attending: INTERNAL MEDICINE
Payer: MEDICARE

## 2020-07-20 LAB
ANION GAP SERPL CALC-SCNC: 13 MMOL/L (ref 7–16)
BACTERIA SPEC RESP CULT: NORMAL
BASOPHILS # BLD AUTO: 0.4 % (ref 0–1.8)
BASOPHILS # BLD: 0.05 K/UL (ref 0–0.12)
BUN SERPL-MCNC: 15 MG/DL (ref 8–22)
CALCIUM SERPL-MCNC: 9.4 MG/DL (ref 8.5–10.5)
CHLORIDE SERPL-SCNC: 95 MMOL/L (ref 96–112)
CO2 SERPL-SCNC: 30 MMOL/L (ref 20–33)
CREAT SERPL-MCNC: 1.1 MG/DL (ref 0.5–1.4)
CRP SERPL HS-MCNC: 16.42 MG/DL (ref 0–0.75)
EOSINOPHIL # BLD AUTO: 0.25 K/UL (ref 0–0.51)
EOSINOPHIL NFR BLD: 2.2 % (ref 0–6.9)
ERYTHROCYTE [DISTWIDTH] IN BLOOD BY AUTOMATED COUNT: 50.5 FL (ref 35.9–50)
GLUCOSE BLD-MCNC: 164 MG/DL (ref 65–99)
GLUCOSE BLD-MCNC: 65 MG/DL (ref 65–99)
GLUCOSE BLD-MCNC: 72 MG/DL (ref 65–99)
GLUCOSE BLD-MCNC: 73 MG/DL (ref 65–99)
GLUCOSE BLD-MCNC: 94 MG/DL (ref 65–99)
GLUCOSE SERPL-MCNC: 82 MG/DL (ref 65–99)
GRAM STN SPEC: NORMAL
HCT VFR BLD AUTO: 43.7 % (ref 42–52)
HGB BLD-MCNC: 14.1 G/DL (ref 14–18)
IMM GRANULOCYTES # BLD AUTO: 0.07 K/UL (ref 0–0.11)
IMM GRANULOCYTES NFR BLD AUTO: 0.6 % (ref 0–0.9)
LYMPHOCYTES # BLD AUTO: 1.48 K/UL (ref 1–4.8)
LYMPHOCYTES NFR BLD: 12.9 % (ref 22–41)
MCH RBC QN AUTO: 29.7 PG (ref 27–33)
MCHC RBC AUTO-ENTMCNC: 32.3 G/DL (ref 33.7–35.3)
MCV RBC AUTO: 92.2 FL (ref 81.4–97.8)
MONOCYTES # BLD AUTO: 0.92 K/UL (ref 0–0.85)
MONOCYTES NFR BLD AUTO: 8 % (ref 0–13.4)
NEUTROPHILS # BLD AUTO: 8.71 K/UL (ref 1.82–7.42)
NEUTROPHILS NFR BLD: 75.9 % (ref 44–72)
NRBC # BLD AUTO: 0 K/UL
NRBC BLD-RTO: 0 /100 WBC
PLATELET # BLD AUTO: 220 K/UL (ref 164–446)
PMV BLD AUTO: 10.7 FL (ref 9–12.9)
POTASSIUM SERPL-SCNC: 3.4 MMOL/L (ref 3.6–5.5)
POTASSIUM SERPL-SCNC: 3.8 MMOL/L (ref 3.6–5.5)
PREALB SERPL-MCNC: 9.9 MG/DL (ref 18–38)
RBC # BLD AUTO: 4.74 M/UL (ref 4.7–6.1)
SIGNIFICANT IND 70042: NORMAL
SITE SITE: NORMAL
SODIUM SERPL-SCNC: 138 MMOL/L (ref 135–145)
SOURCE SOURCE: NORMAL
WBC # BLD AUTO: 11.5 K/UL (ref 4.8–10.8)

## 2020-07-20 PROCEDURE — 700102 HCHG RX REV CODE 250 W/ 637 OVERRIDE(OP): Performed by: INTERNAL MEDICINE

## 2020-07-20 PROCEDURE — 85025 COMPLETE CBC W/AUTO DIFF WBC: CPT

## 2020-07-20 PROCEDURE — 86140 C-REACTIVE PROTEIN: CPT

## 2020-07-20 PROCEDURE — 94669 MECHANICAL CHEST WALL OSCILL: CPT

## 2020-07-20 PROCEDURE — A9270 NON-COVERED ITEM OR SERVICE: HCPCS | Performed by: INTERNAL MEDICINE

## 2020-07-20 PROCEDURE — 700105 HCHG RX REV CODE 258: Performed by: INTERNAL MEDICINE

## 2020-07-20 PROCEDURE — 82962 GLUCOSE BLOOD TEST: CPT | Mod: 91

## 2020-07-20 PROCEDURE — 700111 HCHG RX REV CODE 636 W/ 250 OVERRIDE (IP): Performed by: INTERNAL MEDICINE

## 2020-07-20 PROCEDURE — 74018 RADEX ABDOMEN 1 VIEW: CPT

## 2020-07-20 PROCEDURE — 97166 OT EVAL MOD COMPLEX 45 MIN: CPT

## 2020-07-20 PROCEDURE — 80048 BASIC METABOLIC PNL TOTAL CA: CPT

## 2020-07-20 PROCEDURE — 99291 CRITICAL CARE FIRST HOUR: CPT | Performed by: INTERNAL MEDICINE

## 2020-07-20 PROCEDURE — 84132 ASSAY OF SERUM POTASSIUM: CPT

## 2020-07-20 PROCEDURE — 700101 HCHG RX REV CODE 250: Performed by: INTERNAL MEDICINE

## 2020-07-20 PROCEDURE — 94640 AIRWAY INHALATION TREATMENT: CPT

## 2020-07-20 PROCEDURE — 97161 PT EVAL LOW COMPLEX 20 MIN: CPT

## 2020-07-20 PROCEDURE — 84134 ASSAY OF PREALBUMIN: CPT

## 2020-07-20 PROCEDURE — 770022 HCHG ROOM/CARE - ICU (200)

## 2020-07-20 RX ORDER — SCOLOPAMINE TRANSDERMAL SYSTEM 1 MG/1
1 PATCH, EXTENDED RELEASE TRANSDERMAL
Status: DISCONTINUED | OUTPATIENT
Start: 2020-07-20 | End: 2020-07-30

## 2020-07-20 RX ORDER — GLYCOPYRROLATE 0.2 MG/ML
0.2 INJECTION INTRAMUSCULAR; INTRAVENOUS ONCE
Status: COMPLETED | OUTPATIENT
Start: 2020-07-20 | End: 2020-07-20

## 2020-07-20 RX ORDER — POTASSIUM CHLORIDE 7.45 MG/ML
10 INJECTION INTRAVENOUS
Status: COMPLETED | OUTPATIENT
Start: 2020-07-20 | End: 2020-07-20

## 2020-07-20 RX ADMIN — SCOLOPAMINE TRANSDERMAL SYSTEM 1 PATCH: 1 PATCH, EXTENDED RELEASE TRANSDERMAL at 15:50

## 2020-07-20 RX ADMIN — PIPERACILLIN AND TAZOBACTAM 4.5 G: 4; .5 INJECTION, POWDER, LYOPHILIZED, FOR SOLUTION INTRAVENOUS; PARENTERAL at 12:24

## 2020-07-20 RX ADMIN — PIPERACILLIN AND TAZOBACTAM 4.5 G: 4; .5 INJECTION, POWDER, LYOPHILIZED, FOR SOLUTION INTRAVENOUS; PARENTERAL at 05:24

## 2020-07-20 RX ADMIN — GLYCOPYRROLATE 0.2 MG: 0.2 INJECTION INTRAMUSCULAR; INTRAVENOUS at 15:50

## 2020-07-20 RX ADMIN — FUROSEMIDE 20 MG: 10 INJECTION, SOLUTION INTRAMUSCULAR; INTRAVENOUS at 05:24

## 2020-07-20 RX ADMIN — FUROSEMIDE 20 MG: 10 INJECTION, SOLUTION INTRAMUSCULAR; INTRAVENOUS at 12:56

## 2020-07-20 RX ADMIN — FUROSEMIDE 20 MG: 10 INJECTION, SOLUTION INTRAMUSCULAR; INTRAVENOUS at 22:12

## 2020-07-20 RX ADMIN — HEPARIN SODIUM 5000 UNITS: 5000 INJECTION, SOLUTION INTRAVENOUS; SUBCUTANEOUS at 12:56

## 2020-07-20 RX ADMIN — HEPARIN SODIUM 5000 UNITS: 5000 INJECTION, SOLUTION INTRAVENOUS; SUBCUTANEOUS at 05:24

## 2020-07-20 RX ADMIN — POTASSIUM CHLORIDE 10 MEQ: 7.46 INJECTION, SOLUTION INTRAVENOUS at 12:24

## 2020-07-20 RX ADMIN — HEPARIN SODIUM 5000 UNITS: 5000 INJECTION, SOLUTION INTRAVENOUS; SUBCUTANEOUS at 22:12

## 2020-07-20 RX ADMIN — POTASSIUM CHLORIDE 10 MEQ: 7.46 INJECTION, SOLUTION INTRAVENOUS at 15:00

## 2020-07-20 RX ADMIN — POTASSIUM CHLORIDE 10 MEQ: 7.46 INJECTION, SOLUTION INTRAVENOUS at 14:30

## 2020-07-20 RX ADMIN — POTASSIUM CHLORIDE 10 MEQ: 7.46 INJECTION, SOLUTION INTRAVENOUS at 13:30

## 2020-07-20 RX ADMIN — PIPERACILLIN AND TAZOBACTAM 4.5 G: 4; .5 INJECTION, POWDER, LYOPHILIZED, FOR SOLUTION INTRAVENOUS; PARENTERAL at 20:10

## 2020-07-20 ASSESSMENT — COGNITIVE AND FUNCTIONAL STATUS - GENERAL
STANDING UP FROM CHAIR USING ARMS: A LOT
SUGGESTED CMS G CODE MODIFIER MOBILITY: CL
PERSONAL GROOMING: A LOT
DRESSING REGULAR LOWER BODY CLOTHING: A LOT
SUGGESTED CMS G CODE MODIFIER DAILY ACTIVITY: CL
DRESSING REGULAR UPPER BODY CLOTHING: A LOT
MOVING TO AND FROM BED TO CHAIR: A LOT
DAILY ACTIVITIY SCORE: 12
WALKING IN HOSPITAL ROOM: A LOT
CLIMB 3 TO 5 STEPS WITH RAILING: A LOT
TURNING FROM BACK TO SIDE WHILE IN FLAT BAD: A LOT
MOVING FROM LYING ON BACK TO SITTING ON SIDE OF FLAT BED: A LITTLE
MOBILITY SCORE: 13
HELP NEEDED FOR BATHING: A LOT
EATING MEALS: A LOT
TOILETING: A LOT

## 2020-07-20 ASSESSMENT — ENCOUNTER SYMPTOMS
CHILLS: 0
FEVER: 0
NAUSEA: 0
DIARRHEA: 0
SHORTNESS OF BREATH: 0
WEAKNESS: 1
ABDOMINAL PAIN: 0
VOMITING: 0

## 2020-07-20 ASSESSMENT — GAIT ASSESSMENTS
GAIT LEVEL OF ASSIST: MINIMAL ASSIST
ASSISTIVE DEVICE: HAND HELD ASSIST

## 2020-07-20 NOTE — DIETARY
Nutrition Support Update: TF discontinued on 7/18. Pt with NGT to suction for SBO. Per RN, may start trickle feeds if abd X ray results show SBO resolving. Estimated needs reman the same, see full assessment on 7/18.     Plan/Rec(reviewed with RN): If SBO resolving and MD okay to start trickle feeds, recommend Impact Peptide 1.5 @ 10 ml/hr. Advance feeds as okay per MD to goal rate of 55 ml/hr. RD to follow for plan of care.

## 2020-07-20 NOTE — CARE PLAN
Problem: Nutritional:  Goal: Nutrition support tolerated and meeting greater than 85% of estimated needs  Outcome: NOT MET, see dietary progress note.

## 2020-07-20 NOTE — CARE PLAN
Problem: Hyperinflation:  Goal: Prevent or improve atelectasis  Outcome: PROGRESSING AS EXPECTED   PEP QID  60% of predicted IS value 2010, pt's best value today 500.    Problem: Oxygenation:  Goal: Maintain adequate oxygenation dependent on patient condition  Outcome: PROGRESSING SLOWER THAN EXPECTED   HHFNC 60L, 80% FiO2. Will continue to titrate as tolerated.
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
## 2020-07-21 ENCOUNTER — APPOINTMENT (OUTPATIENT)
Dept: RADIOLOGY | Facility: MEDICAL CENTER | Age: 70
DRG: 871 | End: 2020-07-21
Attending: INTERNAL MEDICINE
Payer: MEDICARE

## 2020-07-21 LAB
ACTION RANGE TRIGGERED IACRT: NO
ANION GAP SERPL CALC-SCNC: 12 MMOL/L (ref 7–16)
BASE EXCESS BLDA CALC-SCNC: 6 MMOL/L (ref -4–3)
BASOPHILS # BLD AUTO: 0.4 % (ref 0–1.8)
BASOPHILS # BLD: 0.05 K/UL (ref 0–0.12)
BODY TEMPERATURE: ABNORMAL DEGREES
BUN SERPL-MCNC: 21 MG/DL (ref 8–22)
CALCIUM SERPL-MCNC: 9.8 MG/DL (ref 8.5–10.5)
CHLORIDE SERPL-SCNC: 91 MMOL/L (ref 96–112)
CO2 BLDA-SCNC: 33 MMOL/L (ref 20–33)
CO2 SERPL-SCNC: 30 MMOL/L (ref 20–33)
CREAT SERPL-MCNC: 1.11 MG/DL (ref 0.5–1.4)
EOSINOPHIL # BLD AUTO: 0.28 K/UL (ref 0–0.51)
EOSINOPHIL NFR BLD: 2.5 % (ref 0–6.9)
ERYTHROCYTE [DISTWIDTH] IN BLOOD BY AUTOMATED COUNT: 49.1 FL (ref 35.9–50)
GLUCOSE BLD-MCNC: 78 MG/DL (ref 65–99)
GLUCOSE BLD-MCNC: 84 MG/DL (ref 65–99)
GLUCOSE BLD-MCNC: 85 MG/DL (ref 65–99)
GLUCOSE BLD-MCNC: 92 MG/DL (ref 65–99)
GLUCOSE SERPL-MCNC: 97 MG/DL (ref 65–99)
HCO3 BLDA-SCNC: 31.7 MMOL/L (ref 17–25)
HCT VFR BLD AUTO: 44.7 % (ref 42–52)
HGB BLD-MCNC: 14.3 G/DL (ref 14–18)
HOROWITZ INDEX BLDA+IHG-RTO: 9000 MM[HG]
IMM GRANULOCYTES # BLD AUTO: 0.09 K/UL (ref 0–0.11)
IMM GRANULOCYTES NFR BLD AUTO: 0.8 % (ref 0–0.9)
INST. QUALIFIED PATIENT IIQPT: YES
LYMPHOCYTES # BLD AUTO: 1.47 K/UL (ref 1–4.8)
LYMPHOCYTES NFR BLD: 13.1 % (ref 22–41)
MCH RBC QN AUTO: 29.3 PG (ref 27–33)
MCHC RBC AUTO-ENTMCNC: 32 G/DL (ref 33.7–35.3)
MCV RBC AUTO: 91.6 FL (ref 81.4–97.8)
MONOCYTES # BLD AUTO: 0.9 K/UL (ref 0–0.85)
MONOCYTES NFR BLD AUTO: 8 % (ref 0–13.4)
NEUTROPHILS # BLD AUTO: 8.44 K/UL (ref 1.82–7.42)
NEUTROPHILS NFR BLD: 75.2 % (ref 44–72)
NRBC # BLD AUTO: 0 K/UL
NRBC BLD-RTO: 0 /100 WBC
O2/TOTAL GAS SETTING VFR VENT: 1 %
PCO2 BLDA: 47.6 MMHG (ref 26–37)
PCO2 TEMP ADJ BLDA: 47.8 MMHG (ref 26–37)
PH BLDA: 7.43 [PH] (ref 7.4–7.5)
PH TEMP ADJ BLDA: 7.43 [PH] (ref 7.4–7.5)
PLATELET # BLD AUTO: 225 K/UL (ref 164–446)
PMV BLD AUTO: 10.6 FL (ref 9–12.9)
PO2 BLDA: 90 MMHG (ref 64–87)
PO2 TEMP ADJ BLDA: 90 MMHG (ref 64–87)
POTASSIUM SERPL-SCNC: 3.4 MMOL/L (ref 3.6–5.5)
POTASSIUM SERPL-SCNC: 4.1 MMOL/L (ref 3.6–5.5)
POTASSIUM SERPL-SCNC: 5.9 MMOL/L (ref 3.6–5.5)
RBC # BLD AUTO: 4.88 M/UL (ref 4.7–6.1)
SAO2 % BLDA: 97 % (ref 93–99)
SODIUM SERPL-SCNC: 133 MMOL/L (ref 135–145)
SPECIMEN DRAWN FROM PATIENT: ABNORMAL
WBC # BLD AUTO: 11.2 K/UL (ref 4.8–10.8)

## 2020-07-21 PROCEDURE — 700111 HCHG RX REV CODE 636 W/ 250 OVERRIDE (IP): Performed by: INTERNAL MEDICINE

## 2020-07-21 PROCEDURE — 31624 DX BRONCHOSCOPE/LAVAGE: CPT | Performed by: INTERNAL MEDICINE

## 2020-07-21 PROCEDURE — 82803 BLOOD GASES ANY COMBINATION: CPT

## 2020-07-21 PROCEDURE — 0BH18EZ INSERTION OF ENDOTRACHEAL AIRWAY INTO TRACHEA, VIA NATURAL OR ARTIFICIAL OPENING ENDOSCOPIC: ICD-10-PCS | Performed by: INTERNAL MEDICINE

## 2020-07-21 PROCEDURE — 99291 CRITICAL CARE FIRST HOUR: CPT | Mod: 25 | Performed by: INTERNAL MEDICINE

## 2020-07-21 PROCEDURE — 0B968ZZ DRAINAGE OF RIGHT LOWER LOBE BRONCHUS, VIA NATURAL OR ARTIFICIAL OPENING ENDOSCOPIC: ICD-10-PCS | Performed by: INTERNAL MEDICINE

## 2020-07-21 PROCEDURE — 84132 ASSAY OF SERUM POTASSIUM: CPT | Mod: 91

## 2020-07-21 PROCEDURE — B548ZZA ULTRASONOGRAPHY OF SUPERIOR VENA CAVA, GUIDANCE: ICD-10-PCS | Performed by: INTERNAL MEDICINE

## 2020-07-21 PROCEDURE — 99292 CRITICAL CARE ADDL 30 MIN: CPT | Mod: 25 | Performed by: INTERNAL MEDICINE

## 2020-07-21 PROCEDURE — 80048 BASIC METABOLIC PNL TOTAL CA: CPT

## 2020-07-21 PROCEDURE — 31500 INSERT EMERGENCY AIRWAY: CPT

## 2020-07-21 PROCEDURE — 94640 AIRWAY INHALATION TREATMENT: CPT

## 2020-07-21 PROCEDURE — 31645 BRNCHSC W/THER ASPIR 1ST: CPT | Performed by: INTERNAL MEDICINE

## 2020-07-21 PROCEDURE — 87205 SMEAR GRAM STAIN: CPT

## 2020-07-21 PROCEDURE — 94003 VENT MGMT INPAT SUBQ DAY: CPT

## 2020-07-21 PROCEDURE — 71045 X-RAY EXAM CHEST 1 VIEW: CPT

## 2020-07-21 PROCEDURE — 85025 COMPLETE CBC W/AUTO DIFF WBC: CPT

## 2020-07-21 PROCEDURE — 31500 INSERT EMERGENCY AIRWAY: CPT | Performed by: INTERNAL MEDICINE

## 2020-07-21 PROCEDURE — 700101 HCHG RX REV CODE 250: Performed by: INTERNAL MEDICINE

## 2020-07-21 PROCEDURE — 99152 MOD SED SAME PHYS/QHP 5/>YRS: CPT

## 2020-07-21 PROCEDURE — 82962 GLUCOSE BLOOD TEST: CPT | Mod: 91

## 2020-07-21 PROCEDURE — A9270 NON-COVERED ITEM OR SERVICE: HCPCS | Performed by: INTERNAL MEDICINE

## 2020-07-21 PROCEDURE — 700105 HCHG RX REV CODE 258: Performed by: INTERNAL MEDICINE

## 2020-07-21 PROCEDURE — 36556 INSERT NON-TUNNEL CV CATH: CPT | Mod: RT | Performed by: INTERNAL MEDICINE

## 2020-07-21 PROCEDURE — 87070 CULTURE OTHR SPECIMN AEROBIC: CPT

## 2020-07-21 PROCEDURE — 94002 VENT MGMT INPAT INIT DAY: CPT

## 2020-07-21 PROCEDURE — 302978 HCHG BRONCHOSCOPY-DIAGNOSTIC

## 2020-07-21 PROCEDURE — 0B9B8ZZ DRAINAGE OF LEFT LOWER LOBE BRONCHUS, VIA NATURAL OR ARTIFICIAL OPENING ENDOSCOPIC: ICD-10-PCS | Performed by: INTERNAL MEDICINE

## 2020-07-21 PROCEDURE — 99152 MOD SED SAME PHYS/QHP 5/>YRS: CPT | Performed by: INTERNAL MEDICINE

## 2020-07-21 PROCEDURE — 302214 INTUBATION BOX: Performed by: INTERNAL MEDICINE

## 2020-07-21 PROCEDURE — 770022 HCHG ROOM/CARE - ICU (200)

## 2020-07-21 PROCEDURE — 700102 HCHG RX REV CODE 250 W/ 637 OVERRIDE(OP): Performed by: INTERNAL MEDICINE

## 2020-07-21 PROCEDURE — 94669 MECHANICAL CHEST WALL OSCILL: CPT

## 2020-07-21 PROCEDURE — 5A1945Z RESPIRATORY VENTILATION, 24-96 CONSECUTIVE HOURS: ICD-10-PCS | Performed by: INTERNAL MEDICINE

## 2020-07-21 PROCEDURE — 02HV33Z INSERTION OF INFUSION DEVICE INTO SUPERIOR VENA CAVA, PERCUTANEOUS APPROACH: ICD-10-PCS | Performed by: INTERNAL MEDICINE

## 2020-07-21 RX ORDER — NOREPINEPHRINE BITARTRATE 0.03 MG/ML
0-30 INJECTION, SOLUTION INTRAVENOUS CONTINUOUS
Status: DISCONTINUED | OUTPATIENT
Start: 2020-07-21 | End: 2020-07-23

## 2020-07-21 RX ORDER — BISACODYL 10 MG
10 SUPPOSITORY, RECTAL RECTAL
Status: DISCONTINUED | OUTPATIENT
Start: 2020-07-21 | End: 2020-07-23

## 2020-07-21 RX ORDER — PROPOFOL 10 MG/ML
20 INJECTION, EMULSION INTRAVENOUS ONCE
Status: COMPLETED | OUTPATIENT
Start: 2020-07-21 | End: 2020-07-21

## 2020-07-21 RX ORDER — ETOMIDATE 2 MG/ML
20 INJECTION INTRAVENOUS ONCE
Status: COMPLETED | OUTPATIENT
Start: 2020-07-21 | End: 2020-07-21

## 2020-07-21 RX ORDER — IPRATROPIUM BROMIDE AND ALBUTEROL SULFATE 2.5; .5 MG/3ML; MG/3ML
3 SOLUTION RESPIRATORY (INHALATION)
Status: DISCONTINUED | OUTPATIENT
Start: 2020-07-21 | End: 2020-08-07 | Stop reason: HOSPADM

## 2020-07-21 RX ORDER — PHENYLEPHRINE HCL IN 0.9% NACL 0.5 MG/5ML
300 SYRINGE (ML) INTRAVENOUS ONCE
Status: COMPLETED | OUTPATIENT
Start: 2020-07-21 | End: 2020-07-21

## 2020-07-21 RX ORDER — POTASSIUM CHLORIDE 7.45 MG/ML
10 INJECTION INTRAVENOUS
Status: COMPLETED | OUTPATIENT
Start: 2020-07-21 | End: 2020-07-21

## 2020-07-21 RX ORDER — PHENYLEPHRINE HCL IN 0.9% NACL 0.5 MG/5ML
100 SYRINGE (ML) INTRAVENOUS
Status: DISCONTINUED | OUTPATIENT
Start: 2020-07-21 | End: 2020-07-21

## 2020-07-21 RX ORDER — AMOXICILLIN 250 MG
2 CAPSULE ORAL 2 TIMES DAILY
Status: DISCONTINUED | OUTPATIENT
Start: 2020-07-21 | End: 2020-07-23

## 2020-07-21 RX ORDER — POLYETHYLENE GLYCOL 3350 17 G/17G
1 POWDER, FOR SOLUTION ORAL
Status: DISCONTINUED | OUTPATIENT
Start: 2020-07-21 | End: 2020-07-23

## 2020-07-21 RX ORDER — ACETYLCYSTEINE 200 MG/ML
3 SOLUTION ORAL; RESPIRATORY (INHALATION) PRN
Status: ACTIVE | OUTPATIENT
Start: 2020-07-21 | End: 2020-07-21

## 2020-07-21 RX ORDER — FAMOTIDINE 20 MG/1
20 TABLET, FILM COATED ORAL EVERY 12 HOURS
Status: DISCONTINUED | OUTPATIENT
Start: 2020-07-21 | End: 2020-07-23

## 2020-07-21 RX ORDER — SUCCINYLCHOLINE CHLORIDE 20 MG/ML
80 INJECTION INTRAMUSCULAR; INTRAVENOUS ONCE
Status: COMPLETED | OUTPATIENT
Start: 2020-07-21 | End: 2020-07-21

## 2020-07-21 RX ORDER — PHENYLEPHRINE HCL IN 0.9% NACL 0.5 MG/5ML
100 SYRINGE (ML) INTRAVENOUS ONCE
Status: ACTIVE | OUTPATIENT
Start: 2020-07-21 | End: 2020-07-22

## 2020-07-21 RX ORDER — PROPOFOL 10 MG/ML
30 INJECTION, EMULSION INTRAVENOUS ONCE
Status: COMPLETED | OUTPATIENT
Start: 2020-07-21 | End: 2020-07-21

## 2020-07-21 RX ORDER — LIDOCAINE HYDROCHLORIDE 20 MG/ML
5 SOLUTION OROPHARYNGEAL PRN
Status: ACTIVE | OUTPATIENT
Start: 2020-07-21 | End: 2020-07-21

## 2020-07-21 RX ORDER — LIDOCAINE HYDROCHLORIDE 20 MG/ML
5 INJECTION, SOLUTION INFILTRATION; PERINEURAL PRN
Status: ACTIVE | OUTPATIENT
Start: 2020-07-21 | End: 2020-07-21

## 2020-07-21 RX ORDER — PHENYLEPHRINE HCL IN 0.9% NACL 0.5 MG/5ML
200 SYRINGE (ML) INTRAVENOUS ONCE
Status: COMPLETED | OUTPATIENT
Start: 2020-07-21 | End: 2020-07-21

## 2020-07-21 RX ADMIN — PROPOFOL 5 MCG/KG/MIN: 10 INJECTION, EMULSION INTRAVENOUS at 17:44

## 2020-07-21 RX ADMIN — PROPOFOL 20 MG: 10 INJECTION, EMULSION INTRAVENOUS at 16:04

## 2020-07-21 RX ADMIN — PROPOFOL 20 MG: 10 INJECTION, EMULSION INTRAVENOUS at 16:18

## 2020-07-21 RX ADMIN — Medication 300 MCG: at 15:51

## 2020-07-21 RX ADMIN — PIPERACILLIN AND TAZOBACTAM 4.5 G: 4; .5 INJECTION, POWDER, LYOPHILIZED, FOR SOLUTION INTRAVENOUS; PARENTERAL at 05:27

## 2020-07-21 RX ADMIN — FUROSEMIDE 20 MG: 10 INJECTION, SOLUTION INTRAMUSCULAR; INTRAVENOUS at 05:27

## 2020-07-21 RX ADMIN — Medication 100 MCG: at 16:39

## 2020-07-21 RX ADMIN — POTASSIUM CHLORIDE 10 MEQ: 7.46 INJECTION, SOLUTION INTRAVENOUS at 09:36

## 2020-07-21 RX ADMIN — FAMOTIDINE 20 MG: 10 INJECTION INTRAVENOUS at 18:28

## 2020-07-21 RX ADMIN — ETOMIDATE 20 MG: 20 INJECTION, SOLUTION INTRAVENOUS at 15:53

## 2020-07-21 RX ADMIN — FENTANYL CITRATE 100 MCG: 50 INJECTION INTRAMUSCULAR; INTRAVENOUS at 16:34

## 2020-07-21 RX ADMIN — FENTANYL CITRATE 100 MCG: 50 INJECTION INTRAMUSCULAR; INTRAVENOUS at 16:45

## 2020-07-21 RX ADMIN — DOCUSATE SODIUM 50 MG AND SENNOSIDES 8.6 MG 2 TABLET: 8.6; 5 TABLET, FILM COATED ORAL at 18:27

## 2020-07-21 RX ADMIN — FUROSEMIDE 20 MG: 10 INJECTION, SOLUTION INTRAMUSCULAR; INTRAVENOUS at 21:39

## 2020-07-21 RX ADMIN — HEPARIN SODIUM 5000 UNITS: 5000 INJECTION, SOLUTION INTRAVENOUS; SUBCUTANEOUS at 21:39

## 2020-07-21 RX ADMIN — Medication 300 MCG: at 16:06

## 2020-07-21 RX ADMIN — SUCCINYLCHOLINE CHLORIDE 80 MG: 20 INJECTION, SOLUTION INTRAMUSCULAR; INTRAVENOUS; PARENTERAL at 15:54

## 2020-07-21 RX ADMIN — Medication 300 MCG: at 16:31

## 2020-07-21 RX ADMIN — Medication 200 MCG: at 16:15

## 2020-07-21 RX ADMIN — POTASSIUM CHLORIDE 10 MEQ: 7.46 INJECTION, SOLUTION INTRAVENOUS at 11:55

## 2020-07-21 RX ADMIN — HEPARIN SODIUM 5000 UNITS: 5000 INJECTION, SOLUTION INTRAVENOUS; SUBCUTANEOUS at 05:27

## 2020-07-21 RX ADMIN — FENTANYL CITRATE 100 MCG: 50 INJECTION INTRAMUSCULAR; INTRAVENOUS at 15:53

## 2020-07-21 RX ADMIN — NOREPINEPHRINE BITARTRATE 3 MCG/MIN: 1 INJECTION, SOLUTION, CONCENTRATE INTRAVENOUS at 16:43

## 2020-07-21 RX ADMIN — POTASSIUM CHLORIDE 10 MEQ: 7.46 INJECTION, SOLUTION INTRAVENOUS at 10:46

## 2020-07-21 RX ADMIN — FUROSEMIDE 20 MG: 10 INJECTION, SOLUTION INTRAMUSCULAR; INTRAVENOUS at 14:58

## 2020-07-21 RX ADMIN — PIPERACILLIN AND TAZOBACTAM 4.5 G: 4; .5 INJECTION, POWDER, LYOPHILIZED, FOR SOLUTION INTRAVENOUS; PARENTERAL at 12:03

## 2020-07-21 RX ADMIN — PIPERACILLIN AND TAZOBACTAM 4.5 G: 4; .5 INJECTION, POWDER, LYOPHILIZED, FOR SOLUTION INTRAVENOUS; PARENTERAL at 21:39

## 2020-07-21 RX ADMIN — HEPARIN SODIUM 5000 UNITS: 5000 INJECTION, SOLUTION INTRAVENOUS; SUBCUTANEOUS at 14:58

## 2020-07-21 RX ADMIN — POTASSIUM CHLORIDE 10 MEQ: 7.46 INJECTION, SOLUTION INTRAVENOUS at 13:30

## 2020-07-21 RX ADMIN — PROPOFOL 30 MG: 10 INJECTION, EMULSION INTRAVENOUS at 16:10

## 2020-07-21 ASSESSMENT — ENCOUNTER SYMPTOMS
WEAKNESS: 1
SHORTNESS OF BREATH: 0
CHILLS: 0
DIARRHEA: 0
ABDOMINAL PAIN: 0
NAUSEA: 0
VOMITING: 0
FEVER: 0

## 2020-07-21 ASSESSMENT — FIBROSIS 4 INDEX: FIB4 SCORE: 1.89

## 2020-07-21 NOTE — RESPIRATORY CARE
COPD EDUCATION by COPD CLINICAL EDUCATOR  7/20/2020 at 4:38 PM by Leida Sylvester, RRT     Patient interviewed by COPD education team. Patient refused COPD program at this time.  Also refused smoking cessation.
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
## 2020-07-21 NOTE — PROCEDURES
Intubation    Date/Time: 7/21/2020 4:40 PM  Performed by: Ye Carson D.O.  Authorized by: Ye Carson D.O.     Consent:     Consent obtained:  Verbal    Consent given by:  Guardian    Risks discussed:  Aspiration, dental trauma, hypoxia, laryngeal injury, pneumothorax, brain injury, death and bleeding    Alternatives discussed:  Alternative treatment  Universal protocol:     Immediately prior to procedure, a time out was called: yes      Patient identity confirmed:  Arm band  Pre-procedure details:     Patient status:  Altered mental status    Mallampati score:  II    Pretreatment medications:  Fentanyl (fentanyl 100mcg)    Paralytics:  Succinylcholine (succinylcholine 60mg, etomidate 20mg)  Procedure details:     Preoxygenation: high flow nasal cannula.    CPR in progress: no      Intubation method:  Oral    Oral intubation technique:  Video-assisted    Laryngoscope type:  GlideScope    Laryngoscope blade:  Mcginnis 4    Cormack-Lehane Classification:  Grade 2a    Tube size (mm):  8.0    Tube type:  Cuffed    Number of attempts:  1    Ventilation between attempts: no      Cricoid pressure: no    Placement assessment:     Tube secured with:  Adhesive tape and ETT london    Breath sounds:  Equal, reduced on left and absent over the epigastrium    Placement verification: chest rise, direct visualization and fiberoptic scope      CXR findings:  ETT in proper place  Post-procedure details:     Patient tolerance of procedure:  Tolerated well, no immediate complications

## 2020-07-21 NOTE — DISCHARGE PLANNING
"Care Transition Team Assessment  Info obtained from palliative RN Mary's note 7/19. Son Taurus is DPOA. Pt was from [facility]. .     Information Source  Orientation : Disoriented to Event, Disoriented to Place, Disoriented to Time  Information Given By: Other (Comments)         Elopement Risk  Legal Hold: No  Ambulatory or Self Mobile in Wheelchair: No-Not an Elopement Risk  Elopement Risk: Not at Risk for Elopement    Interdisciplinary Discharge Planning  Lives with - Patient's Self Care Capacity: Alone and Unable to Care For Self  Support Systems: Children  Housing / Facility: Assisted Living Residence(\[facility]")  Able to Return to Previous ADL's: No  Mobility Issues: Yes  Prior Services: Continuous (24 Hour) Care Giving Per Service  Assistance Needed: Unknown at this Time    Discharge Preparedness  What is your plan after discharge?: Uncertain - pending medical team collaboration  What are your discharge supports?: Child  Prior Functional Level: Needs Assist with Activities of Daily Living, Needs Assist with Medication Management    Functional Assesment  Prior Functional Level: Needs Assist with Activities of Daily Living, Needs Assist with Medication Management    Finances  Financial Barriers to Discharge: No    Vision / Hearing Impairment  Right Eye Vision: Impaired  Left Eye Vision: Impaired         Advance Directive  Advance Directive?: DPOA for Health Care  Durable Power of  Name and Contact : Taurus Godoy              Discharge Risks or Barriers  Discharge risks or barriers?: Complex medical needs    Anticipated Discharge Information  Anticipated discharge disposition: Discharge needs currently unknown  Discharge Contact Phone Number: [phone]        "
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
## 2020-07-21 NOTE — CARE PLAN
Problem: Communication  Goal: The ability to communicate needs accurately and effectively will improve  Outcome: PROGRESSING SLOWER THAN EXPECTED  Note: Patient with dementia. Requires redirection and orientation often. Check on pt hourly to make sure needs are met.      Problem: Skin Integrity  Goal: Risk for impaired skin integrity will decrease  Outcome: PROGRESSING AS EXPECTED  Note: Q2H turns, waffle mattress in place, padding under O2 device in place

## 2020-07-21 NOTE — PROCEDURES
BRONCHOSCOPY PROCEDURE NOTE      Date: 7/21/2020  Time: 4:45 PM    Time out: performed. Name, MRN, allergy and procedure were confirmed.     Indication: acute hypoxic resp failure, aspiration    Consent: Informed consent obtained from designated decision maker after explaining the benefits/risks of the procedure including but not limited to bleeding, infection, airway trauma or loss thereof, pneumothorax/hemothorax, arrythmia, or death. Patient or surrogate expressed understanding and agreement and signed consent which can be found in the patient's chart.    Procedure: Bronchoscopy with bronchial washing and therapeutic aspiration of secretions    Sedation: propofol, fentanyl    Findings:  Respiratory therapy and nursing at bedside throughout procedure. Patient provided sedation and analgesia throughout the procedure. Placed on full ventilator support with an FiO2 of 100% during procedure. Using a fiberoptic bronchoscope, trachea entered via ET tube.     Large amount of purulent secretions noted immediately in trachea upon entry of ET tube. This extends to kevin and nearly occluding right main stem. This was suctioned immediately. More large amount of thick tenacious purulent secretions were also noted in the right lower lobe and all of this was suctioned as much as I could. Attention then turned to the left lung, minimal amount of secretions noted in the left lower lobes which was suctioned.     All secretions were suctioned and cleared.     Specimen sent to microbiology/pathology: routine gs/cx    Complications:   Patient tolerated procedure well without any difficulties and left in care of bedside nurse/RT.     Estimated blood loss: none    For the above procedure I also performed the conscious sedation and was directly involved with administration of medication, monitoring airway, vital signs, preventing complications.  Administered total 90mg propofol, 100mcg fentanyl in titration fashion until achieving a  level of moderate procedural sedation. Total 500mcg of phenylephrine pushes were given for hypotension      Patient tolerated procedure well without complications from sedation and was left in the care of his bedside nurse and respiratory therapy. Procedural sedation time equals 20 minutes which was separate from procedure time as described above.  Start time: 1610  Stop time: 1630      Ye Carson D.O.  Critical Care Medicine

## 2020-07-22 ENCOUNTER — APPOINTMENT (OUTPATIENT)
Dept: RADIOLOGY | Facility: MEDICAL CENTER | Age: 70
DRG: 871 | End: 2020-07-22
Attending: INTERNAL MEDICINE
Payer: MEDICARE

## 2020-07-22 LAB
ACTION RANGE TRIGGERED IACRT: NO
ANION GAP SERPL CALC-SCNC: 15 MMOL/L (ref 7–16)
BASE EXCESS BLDA CALC-SCNC: 8 MMOL/L (ref -4–3)
BASOPHILS # BLD AUTO: 0.5 % (ref 0–1.8)
BASOPHILS # BLD: 0.07 K/UL (ref 0–0.12)
BODY TEMPERATURE: ABNORMAL DEGREES
BUN SERPL-MCNC: 30 MG/DL (ref 8–22)
CALCIUM SERPL-MCNC: 9.9 MG/DL (ref 8.5–10.5)
CHLORIDE SERPL-SCNC: 95 MMOL/L (ref 96–112)
CO2 BLDA-SCNC: 33 MMOL/L (ref 20–33)
CO2 SERPL-SCNC: 31 MMOL/L (ref 20–33)
CREAT SERPL-MCNC: 1.33 MG/DL (ref 0.5–1.4)
EKG IMPRESSION: NORMAL
EOSINOPHIL # BLD AUTO: 0.24 K/UL (ref 0–0.51)
EOSINOPHIL NFR BLD: 1.8 % (ref 0–6.9)
ERYTHROCYTE [DISTWIDTH] IN BLOOD BY AUTOMATED COUNT: 49.5 FL (ref 35.9–50)
GLUCOSE BLD-MCNC: 105 MG/DL (ref 65–99)
GLUCOSE BLD-MCNC: 106 MG/DL (ref 65–99)
GLUCOSE BLD-MCNC: 130 MG/DL (ref 65–99)
GLUCOSE SERPL-MCNC: 117 MG/DL (ref 65–99)
GRAM STN SPEC: NORMAL
HCO3 BLDA-SCNC: 31.4 MMOL/L (ref 17–25)
HCT VFR BLD AUTO: 43.7 % (ref 42–52)
HGB BLD-MCNC: 14.1 G/DL (ref 14–18)
HOROWITZ INDEX BLDA+IHG-RTO: 133 MM[HG]
IMM GRANULOCYTES # BLD AUTO: 0.21 K/UL (ref 0–0.11)
IMM GRANULOCYTES NFR BLD AUTO: 1.6 % (ref 0–0.9)
INST. QUALIFIED PATIENT IIQPT: YES
LYMPHOCYTES # BLD AUTO: 2.04 K/UL (ref 1–4.8)
LYMPHOCYTES NFR BLD: 15.1 % (ref 22–41)
MAGNESIUM SERPL-MCNC: 1.8 MG/DL (ref 1.5–2.5)
MCH RBC QN AUTO: 29.9 PG (ref 27–33)
MCHC RBC AUTO-ENTMCNC: 32.3 G/DL (ref 33.7–35.3)
MCV RBC AUTO: 92.6 FL (ref 81.4–97.8)
MONOCYTES # BLD AUTO: 1.03 K/UL (ref 0–0.85)
MONOCYTES NFR BLD AUTO: 7.6 % (ref 0–13.4)
NEUTROPHILS # BLD AUTO: 9.94 K/UL (ref 1.82–7.42)
NEUTROPHILS NFR BLD: 73.4 % (ref 44–72)
NRBC # BLD AUTO: 0 K/UL
NRBC BLD-RTO: 0 /100 WBC
O2/TOTAL GAS SETTING VFR VENT: 60 %
PCO2 BLDA: 37 MMHG (ref 26–37)
PCO2 TEMP ADJ BLDA: 37.2 MMHG (ref 26–37)
PH BLDA: 7.54 [PH] (ref 7.4–7.5)
PH TEMP ADJ BLDA: 7.54 [PH] (ref 7.4–7.5)
PHOSPHATE SERPL-MCNC: 2 MG/DL (ref 2.5–4.5)
PLATELET # BLD AUTO: 272 K/UL (ref 164–446)
PMV BLD AUTO: 10.8 FL (ref 9–12.9)
PO2 BLDA: 80 MMHG (ref 64–87)
PO2 TEMP ADJ BLDA: 80 MMHG (ref 64–87)
POTASSIUM SERPL-SCNC: 3.2 MMOL/L (ref 3.6–5.5)
POTASSIUM SERPL-SCNC: 3.4 MMOL/L (ref 3.6–5.5)
RBC # BLD AUTO: 4.72 M/UL (ref 4.7–6.1)
SAO2 % BLDA: 97 % (ref 93–99)
SIGNIFICANT IND 70042: NORMAL
SITE SITE: NORMAL
SODIUM SERPL-SCNC: 141 MMOL/L (ref 135–145)
SOURCE SOURCE: NORMAL
SPECIMEN DRAWN FROM PATIENT: ABNORMAL
TROPONIN T SERPL-MCNC: 41 NG/L (ref 6–19)
WBC # BLD AUTO: 13.5 K/UL (ref 4.8–10.8)

## 2020-07-22 PROCEDURE — 700101 HCHG RX REV CODE 250: Performed by: INTERNAL MEDICINE

## 2020-07-22 PROCEDURE — 99292 CRITICAL CARE ADDL 30 MIN: CPT | Performed by: INTERNAL MEDICINE

## 2020-07-22 PROCEDURE — 700102 HCHG RX REV CODE 250 W/ 637 OVERRIDE(OP): Performed by: INTERNAL MEDICINE

## 2020-07-22 PROCEDURE — 99291 CRITICAL CARE FIRST HOUR: CPT | Performed by: INTERNAL MEDICINE

## 2020-07-22 PROCEDURE — 93005 ELECTROCARDIOGRAM TRACING: CPT | Performed by: INTERNAL MEDICINE

## 2020-07-22 PROCEDURE — 84132 ASSAY OF SERUM POTASSIUM: CPT

## 2020-07-22 PROCEDURE — 84484 ASSAY OF TROPONIN QUANT: CPT

## 2020-07-22 PROCEDURE — 36600 WITHDRAWAL OF ARTERIAL BLOOD: CPT

## 2020-07-22 PROCEDURE — 700111 HCHG RX REV CODE 636 W/ 250 OVERRIDE (IP): Performed by: INTERNAL MEDICINE

## 2020-07-22 PROCEDURE — 83735 ASSAY OF MAGNESIUM: CPT

## 2020-07-22 PROCEDURE — A9270 NON-COVERED ITEM OR SERVICE: HCPCS | Performed by: INTERNAL MEDICINE

## 2020-07-22 PROCEDURE — 82962 GLUCOSE BLOOD TEST: CPT

## 2020-07-22 PROCEDURE — 700105 HCHG RX REV CODE 258: Performed by: INTERNAL MEDICINE

## 2020-07-22 PROCEDURE — 82803 BLOOD GASES ANY COMBINATION: CPT

## 2020-07-22 PROCEDURE — 770022 HCHG ROOM/CARE - ICU (200)

## 2020-07-22 PROCEDURE — 94640 AIRWAY INHALATION TREATMENT: CPT

## 2020-07-22 PROCEDURE — 85025 COMPLETE CBC W/AUTO DIFF WBC: CPT

## 2020-07-22 PROCEDURE — 84100 ASSAY OF PHOSPHORUS: CPT

## 2020-07-22 PROCEDURE — 94003 VENT MGMT INPAT SUBQ DAY: CPT

## 2020-07-22 PROCEDURE — 80048 BASIC METABOLIC PNL TOTAL CA: CPT

## 2020-07-22 PROCEDURE — 71045 X-RAY EXAM CHEST 1 VIEW: CPT

## 2020-07-22 PROCEDURE — 93010 ELECTROCARDIOGRAM REPORT: CPT | Performed by: INTERNAL MEDICINE

## 2020-07-22 RX ORDER — FUROSEMIDE 10 MG/ML
40 INJECTION INTRAMUSCULAR; INTRAVENOUS EVERY 8 HOURS
Status: DISCONTINUED | OUTPATIENT
Start: 2020-07-22 | End: 2020-07-23

## 2020-07-22 RX ORDER — POTASSIUM CHLORIDE 14.9 MG/ML
20 INJECTION INTRAVENOUS ONCE
Status: COMPLETED | OUTPATIENT
Start: 2020-07-22 | End: 2020-07-22

## 2020-07-22 RX ORDER — POTASSIUM CHLORIDE 29.8 MG/ML
40 INJECTION INTRAVENOUS ONCE
Status: COMPLETED | OUTPATIENT
Start: 2020-07-22 | End: 2020-07-22

## 2020-07-22 RX ORDER — MAGNESIUM SULFATE HEPTAHYDRATE 40 MG/ML
2 INJECTION, SOLUTION INTRAVENOUS ONCE
Status: COMPLETED | OUTPATIENT
Start: 2020-07-22 | End: 2020-07-22

## 2020-07-22 RX ADMIN — PIPERACILLIN AND TAZOBACTAM 4.5 G: 4; .5 INJECTION, POWDER, LYOPHILIZED, FOR SOLUTION INTRAVENOUS; PARENTERAL at 05:06

## 2020-07-22 RX ADMIN — PIPERACILLIN AND TAZOBACTAM 4.5 G: 4; .5 INJECTION, POWDER, LYOPHILIZED, FOR SOLUTION INTRAVENOUS; PARENTERAL at 13:09

## 2020-07-22 RX ADMIN — HEPARIN SODIUM 5000 UNITS: 5000 INJECTION, SOLUTION INTRAVENOUS; SUBCUTANEOUS at 21:53

## 2020-07-22 RX ADMIN — PIPERACILLIN AND TAZOBACTAM 4.5 G: 4; .5 INJECTION, POWDER, LYOPHILIZED, FOR SOLUTION INTRAVENOUS; PARENTERAL at 21:53

## 2020-07-22 RX ADMIN — FAMOTIDINE 20 MG: 10 INJECTION INTRAVENOUS at 05:05

## 2020-07-22 RX ADMIN — HEPARIN SODIUM 5000 UNITS: 5000 INJECTION, SOLUTION INTRAVENOUS; SUBCUTANEOUS at 05:05

## 2020-07-22 RX ADMIN — POTASSIUM PHOSPHATE, MONOBASIC AND POTASSIUM PHOSPHATE, DIBASIC 15 MMOL: 224; 236 INJECTION, SOLUTION, CONCENTRATE INTRAVENOUS at 08:15

## 2020-07-22 RX ADMIN — DOCUSATE SODIUM 50 MG AND SENNOSIDES 8.6 MG 2 TABLET: 8.6; 5 TABLET, FILM COATED ORAL at 05:04

## 2020-07-22 RX ADMIN — FAMOTIDINE 20 MG: 10 INJECTION INTRAVENOUS at 18:08

## 2020-07-22 RX ADMIN — HEPARIN SODIUM 5000 UNITS: 5000 INJECTION, SOLUTION INTRAVENOUS; SUBCUTANEOUS at 13:09

## 2020-07-22 RX ADMIN — MAGNESIUM SULFATE 2 G: 2 INJECTION INTRAVENOUS at 08:15

## 2020-07-22 RX ADMIN — FUROSEMIDE 40 MG: 10 INJECTION, SOLUTION INTRAMUSCULAR; INTRAVENOUS at 21:53

## 2020-07-22 RX ADMIN — FUROSEMIDE 20 MG: 10 INJECTION, SOLUTION INTRAMUSCULAR; INTRAVENOUS at 05:05

## 2020-07-22 RX ADMIN — POTASSIUM CHLORIDE 20 MEQ: 14.9 INJECTION, SOLUTION INTRAVENOUS at 21:59

## 2020-07-22 RX ADMIN — FUROSEMIDE 40 MG: 10 INJECTION, SOLUTION INTRAMUSCULAR; INTRAVENOUS at 13:09

## 2020-07-22 RX ADMIN — POTASSIUM CHLORIDE 40 MEQ: 29.8 INJECTION, SOLUTION INTRAVENOUS at 18:09

## 2020-07-22 ASSESSMENT — ENCOUNTER SYMPTOMS
FEVER: 0
SHORTNESS OF BREATH: 1
CHILLS: 0
NAUSEA: 0
VOMITING: 0
DIARRHEA: 0
ABDOMINAL PAIN: 0
WEAKNESS: 1

## 2020-07-22 ASSESSMENT — FIBROSIS 4 INDEX: FIB4 SCORE: 1.56

## 2020-07-22 NOTE — DIETARY
"Nutrition Services: TF update    Admit day 5.  TF ordered , but later D/c'd due to concern of possible SBO. NGT placed. Prior TF orders: Impact Peptide 1.5, goal rate 55 ml/hr to provide 1980 kcal, 124 grams protein, and 1016 ml free water per day.  Spoke with RN today about NPO status. RN said TF Impact Peptide 1.5 has been running @ 20 ml/hr via NGT, but was turned off when pt self-extubated this morning.   RD spoke with Dr. Carson about placing TF orders back in Epic for goal rate, which he said was OK, but TF will advance only per MD instruction.    Estimated Nutritional Needs based on:   Height: 188 cm (6' 2\")  Weight: 89.5 kg (197 lb 5 oz)  Ideal Body Weight: 86.2 kg (190 lb)  Percent Ideal Body Weight: 103.8  Body mass index is 25.33 kg/m²., BMI classification: Normal    Calculation/Equation: REE per MSJ x1.2 = 2072 kcal/day  Total Calories / day: 2000 - 2150 (Calories / k - 24)  Total Grams Protein / day: 107 (Grams Protein / k.2)     Evaluation:   1. Weight down 2 kg in 5 days. -3.2 L fluid per I/O.  2. Labs and meds reviewed. 40 mg lasix Q 8 hours.  3. Last BM . Bowel regimen per MAR.  4. Impact Peptide 1.5 remains the appropriate formula for volume-control.    Recommendations/Plan:  1. Impact Peptide 1.5, rate/advancement per MD. RD suggests goal rate of 55 ml/hr to meet needs.     RD following.   "

## 2020-07-22 NOTE — PROGRESS NOTES
0755 - Patient partially extubated self while in BL soft wrist restraints. RN, RT and Dr. Carson at bedside. Patient fully extubated by team. Propofol drip also stopped. Taurus Silva updated by primary RN and Dr. Carson. Patient on 10 L NRB.    0830 - Patient now on high flow nasal cannula per Dr. Carson's order.

## 2020-07-22 NOTE — CARE PLAN
Problem: Skin Integrity  Goal: Risk for impaired skin integrity will decrease  Outcome: PROGRESSING AS EXPECTED  Note: Q2H turns, waffle mattress, pillows in place for protection and repositioning. Mepilex in place for protection.     Problem: Safety - Medical Restraint  Goal: Remains free of injury from restraints (Restraint for Interference with Medical Device)  Description: INTERVENTIONS:  1. Determine that other, less restrictive measures have been tried or would not be effective before applying the restraint  2. Evaluate the patient's condition at the time of restraint application  3. Inform patient/family regarding the reason for restraint  4. Q2H: Monitor safety, psychosocial status, comfort, nutrition and hydration  Outcome: PROGRESSING AS EXPECTED  Note: Remains free from injury from restraints. Assess every 2 hours and perform ROM

## 2020-07-22 NOTE — PROGRESS NOTES
Critical Care Progress Note    Date of admission  7/17/2020    Chief Complaint  69 y.o. male with reported history of CHF, right eye removal, history of severe facial trauma with plate repair, previous CVA, recent hospitalization for pneumonia, MRSA nares positive who presented 7/17/2020 as transfer from [facility] secondary to possible aspiration pneumonitis.  Patient was initially admitted there ?7/8 after being treated at [facility] for pneumonia 6/28-7/7.  At OSH he was given Zosyn and Septra.  On arrival patient was on 15 L nonrebreather satting low 90s.  DNR - I okay    Hospital Course    7/18 remained on HFNC with increasing amount of oxygen requirement 100%/60L/min  7/19 started lasix 20 Q8H, diuresing well. Weaning down HFNC to 60%/50  7/20 continued lasix 20 q8H, abd xray with improvement of dilated bowel, minimal NG output. started trickle feed at 10cc/hr. HFNC back up to 80%  7/21 worsening hypoxia, intubated, bronched and found large amount of purulent secretions in kevin, right main stem and RLL, placed on norepi        Interval Problem Update  Reviewed last 24 hour events:  Intubated yesterday  Afebrile  HR in 60s, SBP in 110-120s, MAP >65  Slowly able to wean down HFNC FIO2 60%, 60L/min.  WBC 13.5, plt 272  Creatinine 1.33, sodium 141, HCO3 26  LFT normal   BG 90-160s. Tolerating trickle feed at 20cc/hr.   Lasix 20 Q8H, -400cc/hr.   I/O negative for 2L in the past 24 hours, -2.2L since admission    Update  This am at 8am, pt self-extubated during SAT  He was able to maintain his airway and was doing ok with 15L/min NRB sat >90%  Throughout the day, his resp status is worsening again, requiring HFNC at 100%/60L/min.   Oxygen saturation ranging from 83-92%  Pt is not with any increased WOB.   We spoke with son, Taurus this am who lives in [place] and mentioned that he will be here this afternoon.   I did ask the patient about what his long term wishes are in  regards to intubation but he can't make a decision without Taurus  Around 5pm, Taurus is not here yet. We did call him again and he mentioned he will be here later tonight.   I did have a long conversation regarding goal of care and I feel he doesn't understand the situation.     Review of Systems  Review of Systems   Unable to perform ROS: Mental acuity (limited due to mental status)   Constitutional: Negative for chills, fever and malaise/fatigue.   HENT:        Drooling   Respiratory: Positive for shortness of breath.    Cardiovascular: Negative for chest pain.   Gastrointestinal: Negative for abdominal pain, diarrhea, nausea and vomiting.   Neurological: Positive for weakness.   All other systems reviewed and are negative.       Vital Signs for last 24 hours   Temp:  [36.3 °C (97.4 °F)-36.6 °C (97.9 °F)] 36.6 °C (97.8 °F)  Pulse:  [] 71  Resp:  [18-37] 21  BP: ()/(49-67) 106/56  SpO2:  [82 %-100 %] 96 %    Hemodynamic parameters for last 24 hours       Respiratory Information for the last 24 hours  Vent Mode: APVCMV  Rate (breaths/min): 20  Vt Target (mL): 500  PEEP/CPAP: 12  MAP: 16  Control VTE (exp VT): 602    Physical Exam   Physical Exam  Vitals signs and nursing note reviewed.   Constitutional:       General: He is not in acute distress.     Appearance: He is ill-appearing and toxic-appearing. He is not diaphoretic.      Comments: Alert, oriented, appropriate but appears weak  Elderly, fragile   HENT:      Head: Normocephalic and atraumatic.      Nose:      Comments: High flow device in nares     Mouth/Throat:      Mouth: Mucous membranes are dry.      Comments: Increased oral secretions  Eyes:      General: No scleral icterus.        Right eye: No discharge.         Left eye: No discharge.      Extraocular Movements: Extraocular movements intact.      Conjunctiva/sclera: Conjunctivae normal.   Neck:      Musculoskeletal: Neck supple.   Cardiovascular:      Rate and Rhythm: Normal rate and regular  rhythm.      Pulses: Normal pulses.      Heart sounds: Normal heart sounds. No murmur.   Pulmonary:      Effort: Pulmonary effort is normal. No respiratory distress.      Breath sounds: Rales present. No wheezing or rhonchi.      Comments: Diminished at bases, + crackles  Abdominal:      General: Bowel sounds are normal. There is no distension.      Palpations: Abdomen is soft.      Tenderness: There is no abdominal tenderness. There is no guarding.   Musculoskeletal:         General: No swelling or tenderness.      Right lower leg: Edema present.      Left lower leg: Edema present.      Comments: Trace edema in lower extremities bilaterally   Skin:     General: Skin is warm and dry.      Capillary Refill: Capillary refill takes 2 to 3 seconds.      Coloration: Skin is not jaundiced.   Neurological:      General: No focal deficit present.      Mental Status: He is alert.      Cranial Nerves: No cranial nerve deficit.      Comments: Moving all extremities, no focal neuro deficit   Psychiatric:         Mood and Affect: Mood normal.         Medications  Current Facility-Administered Medications   Medication Dose Route Frequency Provider Last Rate Last Dose   • fentaNYL (SUBLIMAZE) injection 50 mcg  50 mcg Intravenous Q15 MIN PRN CHRIS CherryOApoorva        And   • fentaNYL (SUBLIMAZE) injection 100 mcg  100 mcg Intravenous Q15 MIN PRN DANIEL Cherry.O.   100 mcg at 07/21/20 1645    And   • fentaNYL (SUBLIMAZE) 50 mcg/mL in 50mL (Continuous Infusion)   Intravenous Continuous DANIEL Cherry.O.        And   • propofol (DIPRIVAN) injection  0-40 mcg/kg/min Intravenous Continuous DANIEL Cherry.O. 5.4 mL/hr at 07/21/20 1835 10 mcg/kg/min at 07/21/20 1835   • Respiratory Therapy Consult   Nebulization Continuous RT CHRIS CherryO.       • ipratropium-albuterol (DUONEB) nebulizer solution  3 mL Nebulization Q2HRS PRN (RT) CHRIS CherryO.       • famotidine (PEPCID) tablet 20 mg  20 mg Enteral Tube Q12HRS Ye Carson D.O.        Or    • famotidine (PEPCID) injection 20 mg  20 mg Intravenous Q12HRS CHRIS CherryO.   20 mg at 07/22/20 0505   • senna-docusate (PERICOLACE or SENOKOT S) 8.6-50 MG per tablet 2 Tab  2 Tab Enteral Tube BID CHRIS CherryO.   2 Tab at 07/22/20 0504    And   • polyethylene glycol/lytes (MIRALAX) PACKET 1 Packet  1 Packet Enteral Tube QDAY PRN Ye Carson D.O.        And   • magnesium hydroxide (MILK OF MAGNESIA) suspension 30 mL  30 mL Enteral Tube QDAY PRN CHRIS CherryOApoorva        And   • bisacodyl (DULCOLAX) suppository 10 mg  10 mg Rectal QDAY PRN CHRIS CherryO.       • MD Alert...ICU Electrolyte Replacement per Pharmacy   Other PHARMACY TO DOSE Ye Carson D.O.       • lidocaine (XYLOCAINE) 1 % injection 1-2 mL  1-2 mL Tracheal Tube Q30 MIN PRN Ye Carson D.O.       • norepinephrine (Levophed) infusion 8 mg/250 mL (premix)  0-30 mcg/min Intravenous Continuous CHRIS CherryOApoorva 3.8 mL/hr at 07/21/20 1836 2 mcg/min at 07/21/20 1836   • lidocaine (XYLOCAINE) 1 % injection 0.5 mL  0.5 mL Intradermal Once PRN CHRIS CherryO.       • phenylephrine (LOUIS-SYNEPHRINE) 100 mcg/mL inj (IV Push Syringe) 100 mcg  100 mcg Intravenous Once Ye Carson D.O.       • scopolamine (TRANSDERM-SCOP) patch 1 Patch  1 Patch Transdermal Q72HRS CHRIS CherryOApoorva   1 Patch at 07/20/20 1550   • furosemide (LASIX) injection 20 mg  20 mg Intravenous Q8HRS DANIEL Cherry.O.   20 mg at 07/22/20 0505   • heparin injection 5,000 Units  5,000 Units Subcutaneous Q8HRS DANIEL Cherry.O.   5,000 Units at 07/22/20 0505   • Pharmacy Consult: Enteral tube insertion - review meds/change route/product selection  1 Each Other PHARMACY TO DOSE Jeffry Dominguez M.D.       • ondansetron (ZOFRAN ODT) dispertab 4 mg  4 mg Enteral Tube Q4HRS PRN Ye Carson D.O.       • labetalol (NORMODYNE/TRANDATE) injection 10 mg  10 mg Intravenous Q4HRS PRN Jeffry Dominguez M.D.       • ondansetron (ZOFRAN) syringe/vial injection 4 mg  4 mg Intravenous Q4HRS PRN Jeffry Dominguez,  M.D.       • insulin regular (HumuLIN R,NovoLIN R) injection  1-6 Units Subcutaneous Q6HRS Jeffry Dominguez M.D.   Stopped at 07/17/20 2210    And   • dextrose 50% (D50W) injection 50 mL  50 mL Intravenous Q15 MIN PRN Jeffry Dominguez M.D.   50 mL at 07/19/20 2354   • piperacillin-tazobactam (ZOSYN) 4.5 g in  mL IVPB  4.5 g Intravenous Q8HRS Ye Carson D.O. 25 mL/hr at 07/22/20 0506 4.5 g at 07/22/20 0506       Fluids    Intake/Output Summary (Last 24 hours) at 7/22/2020 0654  Last data filed at 7/22/2020 0600  Gross per 24 hour   Intake 380 ml   Output 1685 ml   Net -1305 ml       Laboratory  Recent Labs     07/21/20  1726 07/22/20  0337   ISTATAPH 7.431 7.538*   ISTATAPCO2 47.6* 37.0   ISTATAPO2 90* 80   ISTATATCO2 33 33   AMENMCJ1QEZ 97 97   ISTATARTHCO3 31.7* 31.4*   ISTATARTBE 6* 8*   ISTATTEMP 98.8 F 98.8 F   ISTATFIO2 1.0 60   ISTATSPEC Arterial Arterial   ISTATAPHTC 7.430 7.536*   HPEHREBE5RD 90* 80         Recent Labs     07/20/20  0525  07/21/20  0541 07/21/20  1505 07/21/20  1630 07/22/20  0515   SODIUM 138  --  133*  --   --  141   POTASSIUM 3.4*   < > 3.4* 5.9* 4.1 3.4*   CHLORIDE 95*  --  91*  --   --  95*   CO2 30  --  30  --   --  31   BUN 15  --  21  --   --  30*   CREATININE 1.10  --  1.11  --   --  1.33   MAGNESIUM  --   --   --   --   --  1.8   PHOSPHORUS  --   --   --   --   --  2.0*   CALCIUM 9.4  --  9.8  --   --  9.9    < > = values in this interval not displayed.     Recent Labs     07/20/20  0525 07/20/20  1810 07/21/20  0541 07/22/20  0515   PREALBUMIN  --  9.9*  --   --    GLUCOSE 82  --  97 117*     Recent Labs     07/20/20  0525 07/21/20  0541 07/22/20  0515   WBC 11.5* 11.2* 13.5*   NEUTSPOLYS 75.90* 75.20* 73.40*   LYMPHOCYTES 12.90* 13.10* 15.10*   MONOCYTES 8.00 8.00 7.60   EOSINOPHILS 2.20 2.50 1.80   BASOPHILS 0.40 0.40 0.50     Recent Labs     07/20/20  0525 07/21/20  0541 07/22/20  0515   RBC 4.74 4.88 4.72   HEMOGLOBIN 14.1 14.3 14.1   HEMATOCRIT 43.7 44.7 43.7    PLATELETCT 220 225 272       Imaging  X-Ray:  I have personally reviewed the images and compared with prior images. Personally reivewed with evidence of pulmonary edema.   Abdominal Xray noted and personally reviewed - dilated bowel seems improving.     Microbiology  7/18 COVID/SARS CoV2 PCR negative  7/18 BCx 2/2 negative to date    Assessment/Plan  * Acute respiratory failure with hypoxia (HCC)  Assessment & Plan  Due to aspiration pneumonia/pneumonitis  CTA from OSH per report no PE, BB ATX versus infiltrate  7/18 COVID SARS CoV2 PCR negative  7/18 Sputum Cx gram stain GPC, Cx pending  7/20 started on scopolamine patch due to increased oral secretions  7/21 intubated, bronched - large amount purulent secretions  7/22 self-extubated, back on HFNC 100%, 60L/min    Plan;   Continue HFNC, keep sat >88%  Low threshold for intubation  Increase lasix 40 Q8H, goal negative fluid balance  Continue piptazo, treat for total 7 days (end date 7/24)  Mobility, PT following      Goals of care, counseling/discussion  Assessment & Plan  Palliative care has been consulted   Had multiple conversations with Taurus regarding goal of care, and long discussion but at this time, we remain DNR/I ok.    I feel we need ongoing discussion regarding goal of care.       SBO (small bowel obstruction) ([facility])  Assessment & Plan  7/16 CT abdomen pelvis at OSH was personally reviewed and noted diffuse dilated small bowel.   Since admisison, abdomen has been soft, while on NG tube.   7/20 Started on tube feed trickle feed, tolerating at 10cc/hr.   7/21 increased tube feed to 20cc/hr    Plan:   Given he's very tenous with resp status, will hold tube feed for now      Lactic acidosis  Assessment & Plan  Multifactorial possible sepsis +/- respiratory distress +/- any transient ischemia from reported inguinal hernia   Resolved. 0.8 on 7/19      KORIN (acute kidney injury) ([facility])  Assessment & Plan  Creatinine normalized  CT abdomen pelvis from OSH did not  mention hydronephrosis or obstruction.   7/19 started on lasix 20mg IV Q8H    Plan:   Increase lasix to 40mg IV Q8H  Continue lasix, goal negative 500cc-1L net fluid balance  Monitor urine output  Monitor K and Mg, replete as needed to keep K >4 and Mg >2       Palliative care has been consulted to discuss regarding goal of care. Patient is DNR/I ok    VTE:  Heparin  Ulcer: Not Indicated  Lines: Esteves Catheter  Ongoing indication addressed    I have performed a physical exam and reviewed and updated ROS and Plan today (7/22/2020). In review of yesterday's note (7/21/2020), there are no changes except as documented above.     Discussed patient condition and risk of morbidity and/or mortality with Hospitalist, RN, RT and Patient  The patient remains critically ill.  Critical care time = 80 minutes in directly providing and coordinating critical care and extensive data review.  No time overlap and excludes procedures.

## 2020-07-22 NOTE — PROGRESS NOTES
Tube feeding held per Dr. Carson. Reassess after 11 and restarting at 20 mL/hr if no increased WOB, SOB, RR.

## 2020-07-22 NOTE — CARE PLAN
Problem: Nutritional:  Goal: Nutrition support tolerated and meeting greater than 85% of estimated needs  Outcome: PROGRESSING SLOWER THAN EXPECTED

## 2020-07-22 NOTE — PROGRESS NOTES
Procedures:   Rapid sequence intubation  Bronchoscopy  Central venous catheter placement    1553: Timeout occurred for RSI  1610: RSI complete.  Timeout performed for bronchoscopy  1630: bronchoscopy complete. Timeout performed for CVC triple lumen  1645: procedure complete.    Please see MAR for a reflection of intraprocedural medications ordered by Dr. Carson while at bedside.

## 2020-07-22 NOTE — PROCEDURES
Central Line Insertion    Date/Time: 7/21/2020 5:14 PM  Performed by: Ye Carson D.O.  Authorized by: Ye Carson D.O.     Consent:     Consent obtained:  Emergent situation    Alternatives discussed:  Alternative treatment  Universal protocol:     Immediately prior to procedure, a time out was called: yes      Patient identity confirmed:  Arm band  Pre-procedure details:     Hand hygiene: Hand hygiene performed prior to insertion      Sterile barrier technique: All elements of maximal sterile technique followed      Skin preparation:  2% chlorhexidine    Skin preparation agent: Skin preparation agent completely dried prior to procedure    Sedation:     Sedation type:  None  Anesthesia:     Anesthesia method:  Local infiltration    Local anesthetic:  Lidocaine 1% w/o epi  Procedure details:     Location:  R internal jugular    Patient position:  Trendelenburg    Procedural supplies:  Triple lumen    Catheter size:  7 Fr    Landmarks identified: yes      Ultrasound guidance: yes      Sterile ultrasound techniques: Sterile gel and sterile probe covers were used      Number of attempts:  1    Successful placement: yes    Post-procedure details:     Post-procedure:  Dressing applied and line sutured    Assessment:  Blood return through all ports and free fluid flow    Patient tolerance of procedure:  Tolerated well, no immediate complications  Comments:      Pending CXR

## 2020-07-23 ENCOUNTER — APPOINTMENT (OUTPATIENT)
Dept: RADIOLOGY | Facility: MEDICAL CENTER | Age: 70
DRG: 871 | End: 2020-07-23
Attending: INTERNAL MEDICINE
Payer: MEDICARE

## 2020-07-23 LAB
ACTION RANGE TRIGGERED IACRT: YES
ANION GAP SERPL CALC-SCNC: 14 MMOL/L (ref 7–16)
BACTERIA BLD CULT: NORMAL
BACTERIA BLD CULT: NORMAL
BACTERIA SPEC RESP CULT: NORMAL
BASE EXCESS BLDV CALC-SCNC: 7 MMOL/L (ref -4–3)
BASOPHILS # BLD AUTO: 0.7 % (ref 0–1.8)
BASOPHILS # BLD: 0.09 K/UL (ref 0–0.12)
BODY TEMPERATURE: ABNORMAL DEGREES
BUN SERPL-MCNC: 27 MG/DL (ref 8–22)
CALCIUM SERPL-MCNC: 9.7 MG/DL (ref 8.5–10.5)
CHLORIDE SERPL-SCNC: 93 MMOL/L (ref 96–112)
CO2 BLDV-SCNC: 37 MMOL/L (ref 20–33)
CO2 SERPL-SCNC: 30 MMOL/L (ref 20–33)
CREAT SERPL-MCNC: 1.41 MG/DL (ref 0.5–1.4)
CRP SERPL HS-MCNC: 12.04 MG/DL (ref 0–0.75)
EOSINOPHIL # BLD AUTO: 0.29 K/UL (ref 0–0.51)
EOSINOPHIL NFR BLD: 2.1 % (ref 0–6.9)
ERYTHROCYTE [DISTWIDTH] IN BLOOD BY AUTOMATED COUNT: 50.3 FL (ref 35.9–50)
GLUCOSE BLD-MCNC: 100 MG/DL (ref 65–99)
GLUCOSE BLD-MCNC: 113 MG/DL (ref 65–99)
GLUCOSE BLD-MCNC: 85 MG/DL (ref 65–99)
GLUCOSE BLD-MCNC: 91 MG/DL (ref 65–99)
GLUCOSE BLD-MCNC: 96 MG/DL (ref 65–99)
GLUCOSE BLD-MCNC: 97 MG/DL (ref 65–99)
GLUCOSE SERPL-MCNC: 100 MG/DL (ref 65–99)
GRAM STN SPEC: NORMAL
HCO3 BLDV-SCNC: 35.2 MMOL/L (ref 24–28)
HCT VFR BLD AUTO: 46.9 % (ref 42–52)
HGB BLD-MCNC: 15 G/DL (ref 14–18)
HOROWITZ INDEX BLDV+IHG-RTO: 35 MM[HG]
IMM GRANULOCYTES # BLD AUTO: 0.17 K/UL (ref 0–0.11)
IMM GRANULOCYTES NFR BLD AUTO: 1.3 % (ref 0–0.9)
INST. QUALIFIED PATIENT IIQPT: YES
LYMPHOCYTES # BLD AUTO: 1.65 K/UL (ref 1–4.8)
LYMPHOCYTES NFR BLD: 12.2 % (ref 22–41)
MAGNESIUM SERPL-MCNC: 2.1 MG/DL (ref 1.5–2.5)
MCH RBC QN AUTO: 29.8 PG (ref 27–33)
MCHC RBC AUTO-ENTMCNC: 32 G/DL (ref 33.7–35.3)
MCV RBC AUTO: 93.1 FL (ref 81.4–97.8)
MONOCYTES # BLD AUTO: 1 K/UL (ref 0–0.85)
MONOCYTES NFR BLD AUTO: 7.4 % (ref 0–13.4)
NEUTROPHILS # BLD AUTO: 10.33 K/UL (ref 1.82–7.42)
NEUTROPHILS NFR BLD: 76.3 % (ref 44–72)
NRBC # BLD AUTO: 0 K/UL
NRBC BLD-RTO: 0 /100 WBC
O2/TOTAL GAS SETTING VFR VENT: 100 %
PCO2 BLDV: 62.4 MMHG (ref 41–51)
PH BLDV: 7.36 [PH] (ref 7.31–7.45)
PHOSPHATE SERPL-MCNC: 3.5 MG/DL (ref 2.5–4.5)
PLATELET # BLD AUTO: 281 K/UL (ref 164–446)
PMV BLD AUTO: 10.7 FL (ref 9–12.9)
PO2 BLDV: 35 MMHG (ref 25–40)
POTASSIUM SERPL-SCNC: 3.8 MMOL/L (ref 3.6–5.5)
PREALB SERPL-MCNC: 12.7 MG/DL (ref 18–38)
RBC # BLD AUTO: 5.04 M/UL (ref 4.7–6.1)
SAO2 % BLDV: 63 %
SIGNIFICANT IND 70042: NORMAL
SITE SITE: NORMAL
SODIUM SERPL-SCNC: 137 MMOL/L (ref 135–145)
SOURCE SOURCE: NORMAL
SPECIMEN DRAWN FROM PATIENT: ABNORMAL
WBC # BLD AUTO: 13.5 K/UL (ref 4.8–10.8)

## 2020-07-23 PROCEDURE — 31720 CLEARANCE OF AIRWAYS: CPT

## 2020-07-23 PROCEDURE — 700102 HCHG RX REV CODE 250 W/ 637 OVERRIDE(OP): Performed by: INTERNAL MEDICINE

## 2020-07-23 PROCEDURE — 82803 BLOOD GASES ANY COMBINATION: CPT

## 2020-07-23 PROCEDURE — 82962 GLUCOSE BLOOD TEST: CPT | Mod: 91

## 2020-07-23 PROCEDURE — 99291 CRITICAL CARE FIRST HOUR: CPT | Performed by: INTERNAL MEDICINE

## 2020-07-23 PROCEDURE — 94640 AIRWAY INHALATION TREATMENT: CPT

## 2020-07-23 PROCEDURE — 80048 BASIC METABOLIC PNL TOTAL CA: CPT

## 2020-07-23 PROCEDURE — 700105 HCHG RX REV CODE 258: Performed by: INTERNAL MEDICINE

## 2020-07-23 PROCEDURE — 700111 HCHG RX REV CODE 636 W/ 250 OVERRIDE (IP): Performed by: INTERNAL MEDICINE

## 2020-07-23 PROCEDURE — 99292 CRITICAL CARE ADDL 30 MIN: CPT | Performed by: INTERNAL MEDICINE

## 2020-07-23 PROCEDURE — 86140 C-REACTIVE PROTEIN: CPT

## 2020-07-23 PROCEDURE — 84100 ASSAY OF PHOSPHORUS: CPT

## 2020-07-23 PROCEDURE — 770022 HCHG ROOM/CARE - ICU (200)

## 2020-07-23 PROCEDURE — 85025 COMPLETE CBC W/AUTO DIFF WBC: CPT

## 2020-07-23 PROCEDURE — 83735 ASSAY OF MAGNESIUM: CPT

## 2020-07-23 PROCEDURE — A9270 NON-COVERED ITEM OR SERVICE: HCPCS | Performed by: INTERNAL MEDICINE

## 2020-07-23 PROCEDURE — 71045 X-RAY EXAM CHEST 1 VIEW: CPT

## 2020-07-23 PROCEDURE — 84134 ASSAY OF PREALBUMIN: CPT

## 2020-07-23 PROCEDURE — 94760 N-INVAS EAR/PLS OXIMETRY 1: CPT

## 2020-07-23 RX ORDER — BISACODYL 10 MG
10 SUPPOSITORY, RECTAL RECTAL
Status: DISCONTINUED | OUTPATIENT
Start: 2020-07-23 | End: 2020-07-24

## 2020-07-23 RX ORDER — AMOXICILLIN 250 MG
2 CAPSULE ORAL 2 TIMES DAILY
Status: DISCONTINUED | OUTPATIENT
Start: 2020-07-23 | End: 2020-07-24

## 2020-07-23 RX ORDER — ONDANSETRON 4 MG/1
4 TABLET, ORALLY DISINTEGRATING ORAL EVERY 4 HOURS PRN
Status: DISCONTINUED | OUTPATIENT
Start: 2020-07-23 | End: 2020-07-24

## 2020-07-23 RX ORDER — POLYETHYLENE GLYCOL 3350 17 G/17G
1 POWDER, FOR SOLUTION ORAL
Status: DISCONTINUED | OUTPATIENT
Start: 2020-07-23 | End: 2020-07-24

## 2020-07-23 RX ADMIN — PIPERACILLIN AND TAZOBACTAM 4.5 G: 4; .5 INJECTION, POWDER, LYOPHILIZED, FOR SOLUTION INTRAVENOUS; PARENTERAL at 21:31

## 2020-07-23 RX ADMIN — HEPARIN SODIUM 5000 UNITS: 5000 INJECTION, SOLUTION INTRAVENOUS; SUBCUTANEOUS at 12:49

## 2020-07-23 RX ADMIN — ACETAZOLAMIDE 500 MG: 500 INJECTION, POWDER, LYOPHILIZED, FOR SOLUTION INTRAVENOUS at 13:56

## 2020-07-23 RX ADMIN — HEPARIN SODIUM 5000 UNITS: 5000 INJECTION, SOLUTION INTRAVENOUS; SUBCUTANEOUS at 21:31

## 2020-07-23 RX ADMIN — FUROSEMIDE 40 MG: 10 INJECTION, SOLUTION INTRAMUSCULAR; INTRAVENOUS at 05:19

## 2020-07-23 RX ADMIN — SCOLOPAMINE TRANSDERMAL SYSTEM 1 PATCH: 1 PATCH, EXTENDED RELEASE TRANSDERMAL at 16:19

## 2020-07-23 RX ADMIN — DOCUSATE SODIUM 50 MG AND SENNOSIDES 8.6 MG 2 TABLET: 8.6; 5 TABLET, FILM COATED ORAL at 17:47

## 2020-07-23 RX ADMIN — HEPARIN SODIUM 5000 UNITS: 5000 INJECTION, SOLUTION INTRAVENOUS; SUBCUTANEOUS at 05:19

## 2020-07-23 RX ADMIN — FAMOTIDINE 20 MG: 10 INJECTION INTRAVENOUS at 05:19

## 2020-07-23 RX ADMIN — PIPERACILLIN AND TAZOBACTAM 4.5 G: 4; .5 INJECTION, POWDER, LYOPHILIZED, FOR SOLUTION INTRAVENOUS; PARENTERAL at 05:17

## 2020-07-23 RX ADMIN — PIPERACILLIN AND TAZOBACTAM 4.5 G: 4; .5 INJECTION, POWDER, LYOPHILIZED, FOR SOLUTION INTRAVENOUS; PARENTERAL at 12:48

## 2020-07-23 ASSESSMENT — FIBROSIS 4 INDEX: FIB4 SCORE: 1.56

## 2020-07-23 NOTE — PROGRESS NOTES
Spoke with patient and son at bedside discussed CODE STATUS.  Decision was to continue full medical management however DNR/DNI CODE STATUS.  I have updated this in the computer

## 2020-07-23 NOTE — CARE PLAN
Problem: Skin Integrity  Goal: Risk for impaired skin integrity will decrease  Outcome: PROGRESSING AS EXPECTED  Note: Q2H turns, waffle mattress, pillows in use for protection and repositioning. Mepilex in use for protection     Problem: Mobility  Goal: Risk for activity intolerance will decrease  Outcome: PROGRESSING SLOWER THAN EXPECTED  Note: Patient quickly destats with movement

## 2020-07-23 NOTE — PROGRESS NOTES
Critical Care Progress Note    Date of admission  7/17/2020    Chief Complaint  69 y.o. male with reported history of CHF, right eye removal, history of severe facial trauma with plate repair, previous CVA, recent hospitalization for pneumonia, MRSA nares positive who presented 7/17/2020 as transfer from [facility] secondary to possible aspiration pneumonitis.  Patient was initially admitted there ?7/8 after being treated at [facility] for pneumonia 6/28-7/7.  At OSH he was given Zosyn and Septra.  On arrival patient was on 15 L nonrebreather satting low 90s.  DNR - I okay    Hospital Course    7/18 remained on HFNC with increasing amount of oxygen requirement 100%/60L/min  7/19 started lasix 20 Q8H, diuresing well. Weaning down HFNC to 60%/50  7/20 continued lasix 20 q8H, abd xray with improvement of dilated bowel, minimal NG output. started trickle feed at 10cc/hr. HFNC back up to 80%  7/21 worsening hypoxia, intubated, bronched and found large amount of purulent secretions in kevin, right main stem and RLL, placed on norepi  7/22 self extubated, placed on HFNC discussion with son DNR/DNI        Interval Problem Update  Reviewed last 24 hour events:    Neuro: aox2   HR: 70-80's  SBP: 's  Tmax: afebrile  GI: npo, since NG pulled out  UOP: 1100ml zarate   Lines: peripheral  Resp: HFNC 60l/100% DNR/DNI   Vte: heparin   PPI/H2: off   Antibx: 6/7 zosyn broncho negative     Check vbg lasix d/c will give IV diamox  Mobilize  Palliative care consult  Bedside echo poor windows  Lung US no blines, consolidation vs atlectasis      Review of Systems  Review of Systems   Unable to perform ROS: Mental acuity        Vital Signs for last 24 hours   Temp:  [36.5 °C (97.7 °F)-36.6 °C (97.8 °F)] 36.6 °C (97.8 °F)  Pulse:  [70-91] 83  Resp:  [12-30] 24  BP: ()/(54-70) 115/66  SpO2:  [82 %-95 %] 89 %    Hemodynamic parameters for last 24 hours       Respiratory Information for the last 24  hours       Physical Exam   Physical Exam  Vitals signs and nursing note reviewed.   Constitutional:       General: He is not in acute distress.     Appearance: He is ill-appearing. He is not toxic-appearing or diaphoretic.      Comments: Frail elderly male, malnurished   HENT:      Head: Normocephalic and atraumatic.      Nose:      Comments: High flow device in nares     Mouth/Throat:      Mouth: Mucous membranes are moist.      Comments: Increased oral secretions  Eyes:      General: No scleral icterus.        Right eye: No discharge.         Left eye: No discharge.      Extraocular Movements: Extraocular movements intact.      Conjunctiva/sclera: Conjunctivae normal.      Comments: Sunken in eyes   Neck:      Musculoskeletal: Neck supple.   Cardiovascular:      Rate and Rhythm: Normal rate and regular rhythm.      Pulses: Normal pulses.      Heart sounds: Normal heart sounds. No murmur.   Pulmonary:      Effort: Pulmonary effort is normal. No respiratory distress.      Breath sounds: No stridor. No wheezing, rhonchi or rales.      Comments: Diminished at bases  Abdominal:      General: Bowel sounds are normal. There is no distension.      Palpations: Abdomen is soft.      Tenderness: There is no abdominal tenderness. There is no guarding.   Musculoskeletal:         General: No swelling or tenderness.      Right lower leg: No edema.      Left lower leg: No edema.      Comments: Trace edema to sacrum   Skin:     General: Skin is warm and dry.      Capillary Refill: Capillary refill takes 2 to 3 seconds.      Coloration: Skin is not jaundiced.   Neurological:      General: No focal deficit present.      Mental Status: He is alert.      Cranial Nerves: No cranial nerve deficit.      Comments: Knows , year and and president, moves all extremities to commands.    Psychiatric:         Mood and Affect: Mood normal.         Medications  Current Facility-Administered Medications   Medication Dose Route Frequency  Provider Last Rate Last Dose   • acetaZOLAMIDE (DIAMOX) 500 mg in NS 50 mL IVPB  500 mg Intravenous Once Jeffry Maharaj M.D.       • Pharmacy Consult: Enteral tube insertion - review meds/change route/product selection  1 Each Other PHARMACY TO DOSE Ye Carson D.O.       • Respiratory Therapy Consult   Nebulization Continuous RT Ye Carson D.O.       • ipratropium-albuterol (DUONEB) nebulizer solution  3 mL Nebulization Q2HRS PRN (RT) Ye Carson D.O.       • senna-docusate (PERICOLACE or SENOKOT S) 8.6-50 MG per tablet 2 Tab  2 Tab Enteral Tube BID Ye Carson D.O.   Stopped at 07/22/20 1800    And   • polyethylene glycol/lytes (MIRALAX) PACKET 1 Packet  1 Packet Enteral Tube QDAY PRN Ye Carson D.O.        And   • magnesium hydroxide (MILK OF MAGNESIA) suspension 30 mL  30 mL Enteral Tube QDAY PRN Ye Carson D.O.        And   • bisacodyl (DULCOLAX) suppository 10 mg  10 mg Rectal QDAY PRN Ye Carson D.O.       • scopolamine (TRANSDERM-SCOP) patch 1 Patch  1 Patch Transdermal Q72HRS Ye Carson D.O.   1 Patch at 07/20/20 1550   • heparin injection 5,000 Units  5,000 Units Subcutaneous Q8HRS DANIEL Cherry.O.   5,000 Units at 07/23/20 0519   • ondansetron (ZOFRAN ODT) dispertab 4 mg  4 mg Enteral Tube Q4HRS PRN CHRIS CherryO.       • labetalol (NORMODYNE/TRANDATE) injection 10 mg  10 mg Intravenous Q4HRS PRN Jeffry Dominguez M.D.       • ondansetron (ZOFRAN) syringe/vial injection 4 mg  4 mg Intravenous Q4HRS PRN Jeffry Dominguez M.D.       • insulin regular (HumuLIN R,NovoLIN R) injection  1-6 Units Subcutaneous Q6HRS Jeffry Dominguez M.D.   Stopped at 07/17/20 2210    And   • dextrose 50% (D50W) injection 50 mL  50 mL Intravenous Q15 MIN PRN Jeffry Dominguez M.D.   50 mL at 07/19/20 6354   • piperacillin-tazobactam (ZOSYN) 4.5 g in  mL IVPB  4.5 g Intravenous Q8HRS Ye Carson D.O.   Stopped at 07/23/20 0917       Fluids    Intake/Output Summary (Last 24 hours) at 7/23/2020 1203  Last data filed at  7/23/2020 1000  Gross per 24 hour   Intake 196.25 ml   Output 2290 ml   Net -2093.75 ml       Laboratory  Recent Labs     07/21/20  1726 07/22/20  0337 07/23/20  1039   ISTATAPH 7.431 7.538*  --    ISTATAPCO2 47.6* 37.0  --    ISTATAPO2 90* 80  --    ISTATATCO2 33 33  --    [id number] 97 97  --    ISTATARTHCO3 31.7* 31.4*  --    ISTATARTBE 6* 8*  --    ISTATTEMP 98.8 F 98.8 F see below   ISTATFIO2 1.0 60 100   ISTATSPEC Arterial Arterial Venous   ISTATAPHTC 7.430 7.536*  --    [id number] 90* 80  --          Recent Labs     07/21/20  0541  07/22/20  0515 07/22/20  1445 07/23/20  0528   SODIUM 133*  --  141  --  137   POTASSIUM 3.4*   < > 3.4* 3.2* 3.8   CHLORIDE 91*  --  95*  --  93*   CO2 30  --  31  --  30   BUN 21  --  30*  --  27*   CREATININE 1.11  --  1.33  --  1.41*   MAGNESIUM  --   --  1.8  --  2.1   PHOSPHORUS  --   --  2.0*  --  3.5   CALCIUM 9.8  --  9.9  --  9.7    < > = values in this interval not displayed.     Recent Labs     07/20/20  1810 07/21/20  0541 07/22/20  0515 07/23/20  0528   PREALBUMIN 9.9*  --   --  12.7*   GLUCOSE  --  97 117* 100*     Recent Labs     07/21/20  0541 07/22/20  0515 07/23/20  0528   WBC 11.2* 13.5* 13.5*   NEUTSPOLYS 75.20* 73.40* 76.30*   LYMPHOCYTES 13.10* 15.10* 12.20*   MONOCYTES 8.00 7.60 7.40   EOSINOPHILS 2.50 1.80 2.10   BASOPHILS 0.40 0.50 0.70     Recent Labs     07/21/20  0541 07/22/20  0515 07/23/20  0528   RBC 4.88 4.72 5.04   HEMOGLOBIN 14.3 14.1 15.0   HEMATOCRIT 44.7 43.7 46.9   PLATELETCT 225 272 281       Imaging  X-Ray:  I have personally reviewed the images and compared with prior images. and reviwed      Microbiology  7/18 COVID/SARS CoV2 PCR negative  7/18 BCx 2/2 negative to date    Assessment/Plan  * Acute respiratory failure with hypoxia (HCC)  Assessment & Plan  Due to aspiration pneumonia/pneumonitis  CTA from OSH per report no PE, BB ATX versus infiltrate  7/18 COVID SARS CoV2 PCR negative  7/18 Sputum Cx gram stain GPC, Cx pending  7/20  started on scopolamine patch due to increased oral secretions  7/21 intubated, bronched - large amount purulent secretions  7/22 self-extubated, back on HFNC 100%, 60L/min    Plan;   Continue HFNC, keep sat >88%  DNR/DNI  force diuresis goal negative fluid balance  Continue piptazo, treat for total 7 days (end date 7/24)  Mobility, PT following  IS if possible  Consider CT chest for follow up       Goals of care, counseling/discussion  Assessment & Plan  Palliative care has been consulted   SonToby regarding goal of care  Continue ongoing goals  DNR/DNI    SBO (small bowel obstruction) ([facility])  Assessment & Plan  7/16 CT abdomen pelvis at OSH was personally reviewed and noted diffuse dilated small bowel.   Since admisison, abdomen has been soft, while on NG tube.   7/20 Started on tube feed trickle feed, tolerating at 10cc/hr.   7/21 increased tube feed to 20cc/hr -> stop 7/22    Plan:   Given he's very tenous with resp status, will hold tube feed for now -> patient removed cortrax   Continue to discuss with family goals of care  Abdominal exam soft no inguinal hernia or tenderness      Lactic acidosis  Assessment & Plan  Multifactorial possible sepsis +/- respiratory distress +/- any transient ischemia from reported inguinal hernia   Resolved      KORIN (acute kidney injury) ([facility])  Assessment & Plan  Creatinine normalized  CT abdomen pelvis from OSH did not mention hydronephrosis or obstruction.   Monitor urine output  Avoid nephrotoxins       Palliative care has been consulted to discuss regarding goal of care. Patient is DNR/I ok    VTE:  Heparin  Ulcer: Not Indicated  Lines: Esteves Catheter  Ongoing indication addressed    I have performed a physical exam and reviewed and updated ROS and Plan today (7/23/2020). In review of yesterday's note (7/22/2020), there are no changes except as documented above.     Discussed patient condition and risk of morbidity and/or mortality with Hospitalist, RN, RT, Pharmacy, Charge  nurse / hot rounds, Patient and palliative care  The patient remains critically ill on HFNC with active titration.  Critical care time = 77 minutes in directly providing and coordinating critical care and extensive data review.  No time overlap and excludes procedures.

## 2020-07-23 NOTE — THERAPY
PT treatment attempted. RN requested to defer PT session at this time as they just finished mobilizing him to EOB. Per RN, pt desats quickly with light mobility and requires extra time to recover. Will reattempt PT treatment as able.     Luciana Dewitt, PT, DPT Pager: [phone]

## 2020-07-23 NOTE — PROGRESS NOTES
O2 sats down to 82% and maxed on high flow nasal cannula. Dr. Carson updated. NT suction provided. Son updated. Tube feedings and oral administraitons to be held at this time per MD.

## 2020-07-24 ENCOUNTER — APPOINTMENT (OUTPATIENT)
Dept: RADIOLOGY | Facility: MEDICAL CENTER | Age: 70
DRG: 871 | End: 2020-07-24
Attending: INTERNAL MEDICINE
Payer: MEDICARE

## 2020-07-24 LAB
ANION GAP SERPL CALC-SCNC: 14 MMOL/L (ref 7–16)
BASOPHILS # BLD AUTO: 0.9 % (ref 0–1.8)
BASOPHILS # BLD: 0.11 K/UL (ref 0–0.12)
BUN SERPL-MCNC: 39 MG/DL (ref 8–22)
CALCIUM SERPL-MCNC: 10 MG/DL (ref 8.5–10.5)
CHLORIDE SERPL-SCNC: 99 MMOL/L (ref 96–112)
CO2 SERPL-SCNC: 32 MMOL/L (ref 20–33)
CREAT SERPL-MCNC: 1.54 MG/DL (ref 0.5–1.4)
EOSINOPHIL # BLD AUTO: 0.44 K/UL (ref 0–0.51)
EOSINOPHIL NFR BLD: 3.8 % (ref 0–6.9)
ERYTHROCYTE [DISTWIDTH] IN BLOOD BY AUTOMATED COUNT: 51.8 FL (ref 35.9–50)
GLUCOSE BLD-MCNC: 102 MG/DL (ref 65–99)
GLUCOSE BLD-MCNC: 122 MG/DL (ref 65–99)
GLUCOSE BLD-MCNC: 128 MG/DL (ref 65–99)
GLUCOSE SERPL-MCNC: 128 MG/DL (ref 65–99)
HCT VFR BLD AUTO: 49 % (ref 42–52)
HGB BLD-MCNC: 15.2 G/DL (ref 14–18)
IMM GRANULOCYTES # BLD AUTO: 0.17 K/UL (ref 0–0.11)
IMM GRANULOCYTES NFR BLD AUTO: 1.4 % (ref 0–0.9)
LYMPHOCYTES # BLD AUTO: 1.7 K/UL (ref 1–4.8)
LYMPHOCYTES NFR BLD: 14.5 % (ref 22–41)
MAGNESIUM SERPL-MCNC: 2.4 MG/DL (ref 1.5–2.5)
MCH RBC QN AUTO: 29.6 PG (ref 27–33)
MCHC RBC AUTO-ENTMCNC: 31 G/DL (ref 33.7–35.3)
MCV RBC AUTO: 95.5 FL (ref 81.4–97.8)
MONOCYTES # BLD AUTO: 0.96 K/UL (ref 0–0.85)
MONOCYTES NFR BLD AUTO: 8.2 % (ref 0–13.4)
NEUTROPHILS # BLD AUTO: 8.35 K/UL (ref 1.82–7.42)
NEUTROPHILS NFR BLD: 71.2 % (ref 44–72)
NRBC # BLD AUTO: 0 K/UL
NRBC BLD-RTO: 0 /100 WBC
NT-PROBNP SERPL IA-MCNC: 395 PG/ML (ref 0–125)
PHOSPHATE SERPL-MCNC: 3.5 MG/DL (ref 2.5–4.5)
PLATELET # BLD AUTO: 265 K/UL (ref 164–446)
PMV BLD AUTO: 10.6 FL (ref 9–12.9)
POTASSIUM SERPL-SCNC: 3.6 MMOL/L (ref 3.6–5.5)
RBC # BLD AUTO: 5.13 M/UL (ref 4.7–6.1)
SODIUM SERPL-SCNC: 145 MMOL/L (ref 135–145)
WBC # BLD AUTO: 11.7 K/UL (ref 4.8–10.8)

## 2020-07-24 PROCEDURE — 700105 HCHG RX REV CODE 258: Performed by: INTERNAL MEDICINE

## 2020-07-24 PROCEDURE — 94640 AIRWAY INHALATION TREATMENT: CPT

## 2020-07-24 PROCEDURE — 71275 CT ANGIOGRAPHY CHEST: CPT

## 2020-07-24 PROCEDURE — 85025 COMPLETE CBC W/AUTO DIFF WBC: CPT

## 2020-07-24 PROCEDURE — 80048 BASIC METABOLIC PNL TOTAL CA: CPT

## 2020-07-24 PROCEDURE — 94669 MECHANICAL CHEST WALL OSCILL: CPT

## 2020-07-24 PROCEDURE — 97530 THERAPEUTIC ACTIVITIES: CPT

## 2020-07-24 PROCEDURE — 700117 HCHG RX CONTRAST REV CODE 255: Performed by: INTERNAL MEDICINE

## 2020-07-24 PROCEDURE — 700105 HCHG RX REV CODE 258

## 2020-07-24 PROCEDURE — 700101 HCHG RX REV CODE 250: Performed by: INTERNAL MEDICINE

## 2020-07-24 PROCEDURE — 71045 X-RAY EXAM CHEST 1 VIEW: CPT

## 2020-07-24 PROCEDURE — 700111 HCHG RX REV CODE 636 W/ 250 OVERRIDE (IP): Performed by: INTERNAL MEDICINE

## 2020-07-24 PROCEDURE — A9270 NON-COVERED ITEM OR SERVICE: HCPCS | Performed by: INTERNAL MEDICINE

## 2020-07-24 PROCEDURE — 82962 GLUCOSE BLOOD TEST: CPT | Mod: 91

## 2020-07-24 PROCEDURE — 84100 ASSAY OF PHOSPHORUS: CPT

## 2020-07-24 PROCEDURE — 99291 CRITICAL CARE FIRST HOUR: CPT | Performed by: INTERNAL MEDICINE

## 2020-07-24 PROCEDURE — 83880 ASSAY OF NATRIURETIC PEPTIDE: CPT

## 2020-07-24 PROCEDURE — 770022 HCHG ROOM/CARE - ICU (200)

## 2020-07-24 PROCEDURE — 94660 CPAP INITIATION&MGMT: CPT

## 2020-07-24 PROCEDURE — 83735 ASSAY OF MAGNESIUM: CPT

## 2020-07-24 PROCEDURE — 700102 HCHG RX REV CODE 250 W/ 637 OVERRIDE(OP): Performed by: INTERNAL MEDICINE

## 2020-07-24 RX ORDER — IPRATROPIUM BROMIDE AND ALBUTEROL SULFATE 2.5; .5 MG/3ML; MG/3ML
3 SOLUTION RESPIRATORY (INHALATION)
Status: DISCONTINUED | OUTPATIENT
Start: 2020-07-24 | End: 2020-08-02

## 2020-07-24 RX ORDER — SODIUM CHLORIDE, SODIUM LACTATE, POTASSIUM CHLORIDE, AND CALCIUM CHLORIDE .6; .31; .03; .02 G/100ML; G/100ML; G/100ML; G/100ML
500 INJECTION, SOLUTION INTRAVENOUS ONCE
Status: COMPLETED | OUTPATIENT
Start: 2020-07-24 | End: 2020-07-24

## 2020-07-24 RX ORDER — POLYETHYLENE GLYCOL 3350 17 G/17G
1 POWDER, FOR SOLUTION ORAL
Status: DISCONTINUED | OUTPATIENT
Start: 2020-07-24 | End: 2020-08-07 | Stop reason: HOSPADM

## 2020-07-24 RX ORDER — SODIUM CHLORIDE FOR INHALATION 3 %
3 VIAL, NEBULIZER (ML) INHALATION
Status: DISCONTINUED | OUTPATIENT
Start: 2020-07-24 | End: 2020-07-26

## 2020-07-24 RX ORDER — SODIUM CHLORIDE FOR INHALATION 3 %
3 VIAL, NEBULIZER (ML) INHALATION
Status: COMPLETED | OUTPATIENT
Start: 2020-07-24 | End: 2020-07-24

## 2020-07-24 RX ORDER — SODIUM CHLORIDE, SODIUM LACTATE, POTASSIUM CHLORIDE, CALCIUM CHLORIDE 600; 310; 30; 20 MG/100ML; MG/100ML; MG/100ML; MG/100ML
INJECTION, SOLUTION INTRAVENOUS
Status: COMPLETED
Start: 2020-07-24 | End: 2020-07-24

## 2020-07-24 RX ORDER — AMOXICILLIN 250 MG
2 CAPSULE ORAL 2 TIMES DAILY
Status: DISCONTINUED | OUTPATIENT
Start: 2020-07-24 | End: 2020-08-07 | Stop reason: HOSPADM

## 2020-07-24 RX ORDER — ONDANSETRON 4 MG/1
4 TABLET, ORALLY DISINTEGRATING ORAL EVERY 4 HOURS PRN
Status: DISCONTINUED | OUTPATIENT
Start: 2020-07-24 | End: 2020-08-07 | Stop reason: HOSPADM

## 2020-07-24 RX ORDER — BISACODYL 10 MG
10 SUPPOSITORY, RECTAL RECTAL
Status: DISCONTINUED | OUTPATIENT
Start: 2020-07-24 | End: 2020-08-07 | Stop reason: HOSPADM

## 2020-07-24 RX ADMIN — BISACODYL 10 MG: 10 SUPPOSITORY RECTAL at 15:46

## 2020-07-24 RX ADMIN — SODIUM CHLORIDE SOLN NEBU 3% 3 ML: 3 NEBU SOLN at 19:28

## 2020-07-24 RX ADMIN — PIPERACILLIN AND TAZOBACTAM 4.5 G: 4; .5 INJECTION, POWDER, LYOPHILIZED, FOR SOLUTION INTRAVENOUS; PARENTERAL at 21:16

## 2020-07-24 RX ADMIN — PIPERACILLIN AND TAZOBACTAM 4.5 G: 4; .5 INJECTION, POWDER, LYOPHILIZED, FOR SOLUTION INTRAVENOUS; PARENTERAL at 13:47

## 2020-07-24 RX ADMIN — PIPERACILLIN AND TAZOBACTAM 4.5 G: 4; .5 INJECTION, POWDER, LYOPHILIZED, FOR SOLUTION INTRAVENOUS; PARENTERAL at 04:49

## 2020-07-24 RX ADMIN — HEPARIN SODIUM 5000 UNITS: 5000 INJECTION, SOLUTION INTRAVENOUS; SUBCUTANEOUS at 04:49

## 2020-07-24 RX ADMIN — SODIUM CHLORIDE, POTASSIUM CHLORIDE, SODIUM LACTATE AND CALCIUM CHLORIDE 500 ML: 600; 310; 30; 20 INJECTION, SOLUTION INTRAVENOUS at 10:57

## 2020-07-24 RX ADMIN — DOCUSATE SODIUM 50 MG AND SENNOSIDES 8.6 MG 2 TABLET: 8.6; 5 TABLET, FILM COATED ORAL at 17:13

## 2020-07-24 RX ADMIN — SODIUM CHLORIDE, SODIUM LACTATE, POTASSIUM CHLORIDE, AND CALCIUM CHLORIDE 500 ML: .6; .31; .03; .02 INJECTION, SOLUTION INTRAVENOUS at 10:57

## 2020-07-24 RX ADMIN — IPRATROPIUM BROMIDE AND ALBUTEROL SULFATE 3 ML: .5; 3 SOLUTION RESPIRATORY (INHALATION) at 18:30

## 2020-07-24 RX ADMIN — DOCUSATE SODIUM 50 MG AND SENNOSIDES 8.6 MG 2 TABLET: 8.6; 5 TABLET, FILM COATED ORAL at 06:00

## 2020-07-24 RX ADMIN — SODIUM CHLORIDE SOLN NEBU 3% 3 ML: 3 NEBU SOLN at 14:37

## 2020-07-24 RX ADMIN — IOHEXOL 60 ML: 350 INJECTION, SOLUTION INTRAVENOUS at 14:31

## 2020-07-24 RX ADMIN — IPRATROPIUM BROMIDE AND ALBUTEROL SULFATE 3 ML: .5; 3 SOLUTION RESPIRATORY (INHALATION) at 14:38

## 2020-07-24 RX ADMIN — HEPARIN SODIUM 5000 UNITS: 5000 INJECTION, SOLUTION INTRAVENOUS; SUBCUTANEOUS at 13:47

## 2020-07-24 RX ADMIN — SODIUM CHLORIDE, POTASSIUM CHLORIDE, SODIUM LACTATE AND CALCIUM CHLORIDE 500 ML: 600; 310; 30; 20 INJECTION, SOLUTION INTRAVENOUS at 13:13

## 2020-07-24 ASSESSMENT — ENCOUNTER SYMPTOMS
SPEECH CHANGE: 0
CLAUDICATION: 0
HEADACHES: 0
SHORTNESS OF BREATH: 0
TINGLING: 0
FEVER: 0
HALLUCINATIONS: 0
NAUSEA: 0
SPUTUM PRODUCTION: 0
BLURRED VISION: 1
SEIZURES: 0
FOCAL WEAKNESS: 0
NERVOUS/ANXIOUS: 0
DEPRESSION: 0
DOUBLE VISION: 0
STRIDOR: 0
ABDOMINAL PAIN: 0
VOMITING: 0
COUGH: 0
DIARRHEA: 0
MYALGIAS: 0
TREMORS: 0
BACK PAIN: 0

## 2020-07-24 ASSESSMENT — PULMONARY FUNCTION TESTS
EPAP_CMH2O: 12
EPAP_CMH2O: 12

## 2020-07-24 ASSESSMENT — COGNITIVE AND FUNCTIONAL STATUS - GENERAL
MOVING FROM LYING ON BACK TO SITTING ON SIDE OF FLAT BED: UNABLE
CLIMB 3 TO 5 STEPS WITH RAILING: TOTAL
TURNING FROM BACK TO SIDE WHILE IN FLAT BAD: UNABLE
SUGGESTED CMS G CODE MODIFIER MOBILITY: CN
WALKING IN HOSPITAL ROOM: TOTAL
MOVING TO AND FROM BED TO CHAIR: UNABLE
MOBILITY SCORE: 6
STANDING UP FROM CHAIR USING ARMS: TOTAL

## 2020-07-24 ASSESSMENT — GAIT ASSESSMENTS: GAIT LEVEL OF ASSIST: UNABLE TO PARTICIPATE

## 2020-07-24 ASSESSMENT — FIBROSIS 4 INDEX: FIB4 SCORE: 1.51

## 2020-07-24 NOTE — PALLIATIVE CARE
Palliative Care follow-up  Met with the pt at the bedside. Introduced self and spoke about pts connection with his son, spoke about living at the [facility]. Pt speaks lovingly about his son and is looking forward to seeing him later tonight. When asked what he thinks about being stuck in the hospital he is not able to articulate any preferences.   Called and spoke with pts son Taurus who comes in later in the evenings. Spoke about his fathers quality of life here in the hospital and the inability to progress off of the high flow O2. Asked if he had ever discussed his fathers wishes for end of life or had thoughts about this. Taurus stated that he had thought about Hospice but was unsure what services there were in [place]. Let him know that there was Hospice but that availability was limited and always needed to be checked.     Spoke about his fathers plateau in relation to O2 needs and inability to progress. Spoke about CC here in the hospital when a pt cannot be weaned from high flow O2. Explained use of medications for anxiety and a transition to NC O2 from high flow. Taursu stated that he was going to take Emergency family leave and was feeling that this was the prognosis after discussing with Dr Maharaj.   He did state that after speaking with Dr Maharaj that a chest CT was to be done in order to show if there had been any improvement or changes. He also state [facility].   Spoke further on CC and that his father would likely not be able to leave the hospital setting in order to be home with hospice, Taurus felt in agreement and would like to speak again tomorrow after being able to have an update on the CT scan.   Provided Taurus with Palliative Team contact number and agreed to discuss POC/GOC further tomorrow.     Updated: Dr Maharaj, Bedside RN    Plan: Speak with pts son, Taurus, tomorrow for further discussion of POC/GOC.     Thank you for allowing  Palliative Care to support this patient and family. Contact [phone] for additional assistance, change in patient status, or with any questions/concerns.

## 2020-07-24 NOTE — PROGRESS NOTES
Critical Care Progress Note    Date of admission  7/17/2020    Chief Complaint  69 y.o. male with reported history of CHF, right eye removal, history of severe facial trauma with plate repair, previous CVA, recent hospitalization for pneumonia, MRSA nares positive who presented 7/17/2020 as transfer from [facility] secondary to possible aspiration pneumonitis.  Patient was initially admitted there ?7/8 after being treated at [facility] for pneumonia 6/28-7/7.  At OSH he was given Zosyn and Septra.  On arrival patient was on 15 L nonrebreather satting low 90s.  DNR - I okay    Hospital Course    7/18 remained on HFNC with increasing amount of oxygen requirement 100%/60L/min  7/19 started lasix 20 Q8H, diuresing well. Weaning down HFNC to 60%/50  7/20 continued lasix 20 q8H, abd xray with improvement of dilated bowel, minimal NG output. started trickle feed at 10cc/hr. HFNC back up to 80%  7/21 worsening hypoxia, intubated, bronched and found large amount of purulent secretions in kevin, right main stem and RLL, placed on norepi  7/22 self extubated, placed on HFNC discussion with son DNR/DNI  7/23 still on max HFNC without distress, stable vitals        Interval Problem Update  Reviewed last 24 hour events:  Neuro: aox3 1-3 rass 0 no pain  HR: 60-80  SBP: 100-110's  Tmax: afebrile  GI: TF at 30ml tolerating no BM  UOP: 350 ml -> 20 ml thus far  Lines:  Right IJ central line, no gtt  Resp: HFNC 60l 100%, rx, pep, hypertonic trial -> bipap   Vte: heparin  PPI/H2:none  Antibx: 7/7 zosyn    Trial bipap  supsitory for BM  CT chest rule out pe, echo with bubble study  Hypertonic saline neb, pep therapy  LR 500ml bolus      Review of Systems  Review of Systems   Constitutional: Positive for malaise/fatigue. Negative for fever.   HENT: Negative for congestion.    Eyes: Positive for blurred vision. Negative for double vision.   Respiratory: Negative for cough, sputum production, shortness of  breath and stridor.    Cardiovascular: Negative for chest pain, claudication and leg swelling.   Gastrointestinal: Negative for abdominal pain, diarrhea, nausea and vomiting.   Genitourinary: Negative for dysuria and hematuria.   Musculoskeletal: Negative for back pain, joint pain and myalgias.   Neurological: Negative for tingling, tremors, speech change, focal weakness, seizures and headaches.   Psychiatric/Behavioral: Negative for depression and hallucinations. The patient is not nervous/anxious.         Vital Signs for last 24 hours   Temp:  [36.5 °C (97.7 °F)-36.9 °C (98.4 °F)] 36.9 °C (98.4 °F)  Pulse:  [] 64  Resp:  [16-38] 16  BP: (104-132)/() 131/56  SpO2:  [89 %-96 %] 94 %    Hemodynamic parameters for last 24 hours       Respiratory Information for the last 24 hours       Physical Exam   Physical Exam  Vitals signs and nursing note reviewed.   Constitutional:       General: He is not in acute distress.     Appearance: He is ill-appearing. He is not toxic-appearing or diaphoretic.      Comments: Frail elderly male, malnurished on HFNC   HENT:      Head: Normocephalic and atraumatic.      Nose:      Comments: High flow device in nares     Mouth/Throat:      Mouth: Mucous membranes are moist.      Comments: Increased oral secretions  Eyes:      General: No scleral icterus.        Right eye: No discharge.         Left eye: No discharge.      Extraocular Movements: Extraocular movements intact.      Conjunctiva/sclera: Conjunctivae normal.      Comments: Sunken in eyes   Neck:      Musculoskeletal: Neck supple.   Cardiovascular:      Rate and Rhythm: Normal rate and regular rhythm.      Pulses: Normal pulses.      Heart sounds: Normal heart sounds. No murmur.   Pulmonary:      Effort: Pulmonary effort is normal. No respiratory distress.      Breath sounds: No stridor. No wheezing, rhonchi or rales.      Comments: Diminished at bases  Abdominal:      General: Bowel sounds are normal. There is no  distension.      Palpations: Abdomen is soft.      Tenderness: There is no abdominal tenderness. There is no guarding.   Musculoskeletal:         General: No swelling or tenderness.      Right lower leg: Edema present.      Left lower leg: Edema present.      Comments: Trace edema to sacrum and bilateral lower   Skin:     General: Skin is warm and dry.      Capillary Refill: Capillary refill takes 2 to 3 seconds.      Coloration: Skin is not jaundiced.   Neurological:      General: No focal deficit present.      Mental Status: He is alert.      Cranial Nerves: No cranial nerve deficit.      Comments: AOX3, moves all extremities to commands.    Psychiatric:         Mood and Affect: Mood normal.         Medications  Current Facility-Administered Medications   Medication Dose Route Frequency Provider Last Rate Last Dose   • lactated ringer BOLUS infusion  500 mL Intravenous Once Jeffry Maharaj M.D.       • sodium chloride 3% nebulizer solution 3 mL  3 mL Nebulization ONCE (RT) Jeffry Maharaj M.D.       • senna-docusate (PERICOLACE or SENOKOT S) 8.6-50 MG per tablet 2 Tab  2 Tab Oral BID Jeffry Maharaj M.D.   2 Tab at 07/24/20 0600    And   • polyethylene glycol/lytes (MIRALAX) PACKET 1 Packet  1 Packet Oral QDAY PRN Jeffry Maharaj M.D.        And   • magnesium hydroxide (MILK OF MAGNESIA) suspension 30 mL  30 mL Oral QDAY PRN Jeffry Maharaj M.D.        And   • bisacodyl (DULCOLAX) suppository 10 mg  10 mg Rectal QDAY PRN Jeffry Maharaj M.D.       • ondansetron (ZOFRAN ODT) dispertab 4 mg  4 mg Oral Q4HRS PRN Jeffry Maharaj M.D.       • Respiratory Therapy Consult   Nebulization Continuous RT Ye Carson D.O.       • ipratropium-albuterol (DUONEB) nebulizer solution  3 mL Nebulization Q2HRS PRN (RT) Ye Carson D.O.       • scopolamine (TRANSDERM-SCOP) patch 1 Patch  1 Patch Transdermal Q72HRS Ye Carson D.O.   1 Patch at 07/23/20 1619   • heparin injection 5,000 Units  5,000 Units Subcutaneous  Q8HRS DANIEL Cherry.O.   5,000 Units at 07/24/20 0449   • labetalol (NORMODYNE/TRANDATE) injection 10 mg  10 mg Intravenous Q4HRS PRN Jeffry Dominguez M.D.       • ondansetron (ZOFRAN) syringe/vial injection 4 mg  4 mg Intravenous Q4HRS PRN Jeffry Dominguez M.D.       • insulin regular (HumuLIN R,NovoLIN R) injection  1-6 Units Subcutaneous Q6HRS Jeffry Dominguez M.D.   Stopped at 07/17/20 2210    And   • dextrose 50% (D50W) injection 50 mL  50 mL Intravenous Q15 MIN PRN Jeffry Dominguez M.D.   50 mL at 07/19/20 2354   • piperacillin-tazobactam (ZOSYN) 4.5 g in  mL IVPB  4.5 g Intravenous Q8HRS Ye Carson D.O.   Stopped at 07/24/20 0849       Fluids    Intake/Output Summary (Last 24 hours) at 7/24/2020 1029  Last data filed at 7/24/2020 0820  Gross per 24 hour   Intake 400 ml   Output 685 ml   Net -285 ml       Laboratory  Recent Labs     07/21/20  1726 07/22/20  0337 07/23/20  1039   ISTATAPH 7.431 7.538*  --    ISTATAPCO2 47.6* 37.0  --    ISTATAPO2 90* 80  --    ISTATATCO2 33 33  --    [id number] 97 97  --    ISTATARTHCO3 31.7* 31.4*  --    ISTATARTBE 6* 8*  --    ISTATTEMP 98.8 F 98.8 F see below   ISTATFIO2 1.0 60 100   ISTATSPEC Arterial Arterial Venous   ISTATAPHTC 7.430 7.536*  --    [id number] 90* 80  --          Recent Labs     07/22/20  0515 07/22/20  1445 07/23/20  0528 07/24/20  0530   SODIUM 141  --  137 145   POTASSIUM 3.4* 3.2* 3.8 3.6   CHLORIDE 95*  --  93* 99   CO2 31  --  30 32   BUN 30*  --  27* 39*   CREATININE 1.33  --  1.41* 1.54*   MAGNESIUM 1.8  --  2.1 2.4   PHOSPHORUS 2.0*  --  3.5 3.5   CALCIUM 9.9  --  9.7 10.0     Recent Labs     07/22/20 0515 07/23/20 0528 07/24/20 0530   PREALBUMIN  --  12.7*  --    GLUCOSE 117* 100* 128*     Recent Labs     07/22/20 0515 07/23/20 0528 07/24/20 0530   WBC 13.5* 13.5* 11.7*   NEUTSPOLYS 73.40* 76.30* 71.20   LYMPHOCYTES 15.10* 12.20* 14.50*   MONOCYTES 7.60 7.40 8.20   EOSINOPHILS 1.80 2.10 3.80   BASOPHILS 0.50 0.70 0.90     Recent  Labs     07/22/20  0515 07/23/20  0528 07/24/20  0530   RBC 4.72 5.04 5.13   HEMOGLOBIN 14.1 15.0 15.2   HEMATOCRIT 43.7 46.9 49.0   PLATELETCT 272 281 265       Imaging  X-Ray:  I have personally reviewed the images and compared with prior images. and reviwed      Microbiology  7/18 COVID/SARS CoV2 PCR negative  7/18 BCx 2/2 negative to date    Assessment/Plan  * Acute respiratory failure with hypoxia (HCC)  Assessment & Plan  Due to aspiration pneumonia/pneumonitis  CTA from OSH per report no PE, BB ATX versus infiltrate  7/18 COVID SARS CoV2 PCR negative  7/18 Sputum Cx gram stain GPC, Cx pending  7/20 started on scopolamine patch due to increased oral secretions  7/21 intubated, bronched - large amount purulent secretions  7/22 self-extubated, back on HFNC 100%, 60L/min    Plan;   Continue HFNC, keep sat >88% -> trial of Bipap to see if it open him up more  Check CTA chest, Echo with bubble study, BNP  DNR/DNI  Continue piptazo, treat for total 7 days (end date 7/24)  Mobility, PT following      Goals of care, counseling/discussion  Assessment & Plan  Palliative care has been consulted   SonToby regarding goal of care  Continue ongoing goals  DNR/DNI    SBO (small bowel obstruction) ([facility])  Assessment & Plan  7/16 CT abdomen pelvis at OSH was personally reviewed and noted diffuse dilated small bowel.   Since admisison, abdomen has been soft, while on NG tube.   7/20 Started on tube feed trickle feed, tolerating at 10cc/hr.   7/21 increased tube feed to 20cc/hr -> stop 7/22    Plan:   Given he's very tenous with resp status, will hold tube feed for now -> patient removed cortrax -> restart TF 7/24   Continue to discuss with family goals of care  Abdominal exam soft no inguinal hernia or tenderness      Lactic acidosis  Assessment & Plan  Multifactorial possible sepsis +/- respiratory distress +/- any transient ischemia from reported inguinal hernia   Resolved      KORIN (acute kidney injury) ([facility])  Assessment &  Plan  Creatinine normalized  CT abdomen pelvis from OSH did not mention hydronephrosis or obstruction.   Monitor urine output  Avoid nephrotoxins       Palliative care has been consulted to discuss regarding goal of care. Patient is DNR/I ok    VTE:  Heparin  Ulcer: Not Indicated  Lines: Esteves Catheter  Ongoing indication addressed    I have performed a physical exam and reviewed and updated ROS and Plan today (7/24/2020). In review of yesterday's note (7/23/2020), there are no changes except as documented above.     Discussed patient condition and risk of morbidity and/or mortality with RN, RT, Pharmacy, Charge nurse / hot rounds and Patient     The patient remains critically ill on HFNC and bipap with active titration.  Critical care time = 60 minutes in directly providing and coordinating critical care and extensive data review.  No time overlap and excludes procedures.

## 2020-07-24 NOTE — PROGRESS NOTES
12 hr chart check.     Monitor summary: NSR w/ rare PVC and PAC --> 0.12 / 0.08 / 0.40    Pt vss overnight on amxed hi flow. Confused a+ox 1 - 2. Pain managed and comfortable. Needs addressed and questions answered. No complaints overnight.

## 2020-07-24 NOTE — CARE PLAN
Problem: Respiratory:  Goal: Respiratory status will improve  Outcome: PROGRESSING SLOWER THAN EXPECTED     Problem: Safety  Goal: Will remain free from injury  Outcome: PROGRESSING AS EXPECTED     Problem: Fluid Volume:  Goal: Will maintain balanced intake and output  Outcome: PROGRESSING AS EXPECTED

## 2020-07-24 NOTE — THERAPY
PT treatment deferred as pt in process of being taken for CT.   Will reattempt PT treatment as able and pt medically stable to mobilize. Thanks    Luciana Dewitt, PT, DPT Pager: [phone]

## 2020-07-24 NOTE — CARE PLAN
Problem: Nutritional:  Goal: Nutrition support tolerated and meeting greater than 85% of estimated needs  Outcome: PROGRESSING AS EXPECTED   TF advanced to 30 mL/hr per MD.

## 2020-07-24 NOTE — THERAPY
Physical Therapy   Daily Treatment     Patient Name: Johann Godoy  Age:  69 y.o., Sex:  male  Medical Record #: [id number]  Today's Date: 7/24/2020     Precautions: Fall Risk    Assessment    Pt agreeable to PT treatment session. Required greater assist to complete mobility to EOB today compared to initial evaluation. Upon sitting EOB, pt with copious thick secretions requiring extensive oral suctioning to remove. Appeared to breathe with greater ease following cough. Pt tolerated sitting EOB very well with intermittent posterior support as he fatigued. SpO2 ranged from low 80s-92% while EOB and recovered well with cues to rest. Pt able to maintain recovery position to assist in breath recovery while EOB. PT will continue to follow while in house.     Plan    Continue current treatment plan.    Discharge recommendations:  Recommend post-acute placement for continued physical therapy services prior to discharge home.            07/24/20 1540   Precautions   Precautions Fall Risk   Comments HFNC   Vitals   O2 Delivery Device Heated High Flow Nasal Cannula   Vitals Comments SpO2 ranged from 80s-92% while EOB   Pain 0 - 10 Group   Therapist Pain Assessment Nurse Notified;0  (none reported)   Cognition    Level of Consciousness Alert   Comments pleasant and agreeable to mobility. fatigues quickly with light activity. requires multiple rest breaks   Active ROM Lower Body    Active ROM Lower Body  X   Comments B LE limited by weakness and musculoskeletal tightness 2/2 immobility   Strength Lower Body   Lower Body Strength  X   Comments B LE with generalized weakness   Sitting Lower Body Exercises   Sitting Lower Body Exercises Yes   Ankle Pumps 1 set of 10;Bilateral   Long Arc Quad 1 set of 15;Bilateral   Other Exercises seated reaching with B UE   Balance   Sitting Balance (Static) Poor +   Sitting Balance (Dynamic) Poor   Weight Shift Sitting Poor   Skilled Intervention Verbal Cuing;Tactile Cuing;Compensatory Strategies    Comments intermittent posterior support required while EOB   Gait Analysis   Gait Level Of Assist Unable to Participate   Bed Mobility    Supine to Sit Maximal Assist   Sit to Supine Maximal Assist   Scooting Maximal Assist   Rolling Maximum Assist to Lt.   Skilled Intervention Verbal Cuing;Tactile Cuing;Compensatory Strategies  (pt very rigid with mobility)   Functional Mobility   Sit to Stand Unable to Participate   Comments deferred 2/2 tenuous respiratory status   Patient / Family Goals    Patient / Family Goal #1 agreeable to participate.   Goal #1 Outcome Goal met   Short Term Goals    Short Term Goal # 1 Pt will perform bed mobility with HOB and rails as needed with supervision in 6 visits.    Goal Outcome # 1 goal not met   Short Term Goal # 2 Pt will transfer with min A in 6 visits from bed to bs chair to improve functional indep.    Goal Outcome # 2 Goal not met   Short Term Goal # 3 Pt will ambulate x 25 feet using FWW with Fito in 6 visits to improve functional indep.    Goal Outcome # 3 Goal not met

## 2020-07-25 ENCOUNTER — APPOINTMENT (OUTPATIENT)
Dept: CARDIOLOGY | Facility: MEDICAL CENTER | Age: 70
DRG: 871 | End: 2020-07-25
Attending: INTERNAL MEDICINE
Payer: MEDICARE

## 2020-07-25 ENCOUNTER — APPOINTMENT (OUTPATIENT)
Dept: RADIOLOGY | Facility: MEDICAL CENTER | Age: 70
DRG: 871 | End: 2020-07-25
Attending: INTERNAL MEDICINE
Payer: MEDICARE

## 2020-07-25 LAB
ANION GAP SERPL CALC-SCNC: 10 MMOL/L (ref 7–16)
BASOPHILS # BLD AUTO: 0.6 % (ref 0–1.8)
BASOPHILS # BLD: 0.08 K/UL (ref 0–0.12)
BUN SERPL-MCNC: 44 MG/DL (ref 8–22)
CALCIUM SERPL-MCNC: 10.1 MG/DL (ref 8.5–10.5)
CHLORIDE SERPL-SCNC: 104 MMOL/L (ref 96–112)
CO2 SERPL-SCNC: 30 MMOL/L (ref 20–33)
CREAT SERPL-MCNC: 1.49 MG/DL (ref 0.5–1.4)
EOSINOPHIL # BLD AUTO: 0.37 K/UL (ref 0–0.51)
EOSINOPHIL NFR BLD: 2.9 % (ref 0–6.9)
ERYTHROCYTE [DISTWIDTH] IN BLOOD BY AUTOMATED COUNT: 53.1 FL (ref 35.9–50)
GLUCOSE BLD-MCNC: 102 MG/DL (ref 65–99)
GLUCOSE BLD-MCNC: 122 MG/DL (ref 65–99)
GLUCOSE BLD-MCNC: 131 MG/DL (ref 65–99)
GLUCOSE BLD-MCNC: 145 MG/DL (ref 65–99)
GLUCOSE SERPL-MCNC: 151 MG/DL (ref 65–99)
HCT VFR BLD AUTO: 47.6 % (ref 42–52)
HGB BLD-MCNC: 15 G/DL (ref 14–18)
IMM GRANULOCYTES # BLD AUTO: 0.2 K/UL (ref 0–0.11)
IMM GRANULOCYTES NFR BLD AUTO: 1.6 % (ref 0–0.9)
LV EJECT FRACT  99904: 50
LV EJECT FRACT MOD 2C 99903: 51.84
LV EJECT FRACT MOD 4C 99902: 47.48
LV EJECT FRACT MOD BP 99901: 50.38
LYMPHOCYTES # BLD AUTO: 1.6 K/UL (ref 1–4.8)
LYMPHOCYTES NFR BLD: 12.6 % (ref 22–41)
MCH RBC QN AUTO: 30.1 PG (ref 27–33)
MCHC RBC AUTO-ENTMCNC: 31.5 G/DL (ref 33.7–35.3)
MCV RBC AUTO: 95.4 FL (ref 81.4–97.8)
MONOCYTES # BLD AUTO: 0.9 K/UL (ref 0–0.85)
MONOCYTES NFR BLD AUTO: 7.1 % (ref 0–13.4)
NEUTROPHILS # BLD AUTO: 9.55 K/UL (ref 1.82–7.42)
NEUTROPHILS NFR BLD: 75.2 % (ref 44–72)
NRBC # BLD AUTO: 0 K/UL
NRBC BLD-RTO: 0 /100 WBC
PLATELET # BLD AUTO: 275 K/UL (ref 164–446)
PMV BLD AUTO: 10.7 FL (ref 9–12.9)
POTASSIUM SERPL-SCNC: 3.8 MMOL/L (ref 3.6–5.5)
RBC # BLD AUTO: 4.99 M/UL (ref 4.7–6.1)
SODIUM SERPL-SCNC: 144 MMOL/L (ref 135–145)
WBC # BLD AUTO: 12.7 K/UL (ref 4.8–10.8)

## 2020-07-25 PROCEDURE — 80048 BASIC METABOLIC PNL TOTAL CA: CPT

## 2020-07-25 PROCEDURE — 770022 HCHG ROOM/CARE - ICU (200)

## 2020-07-25 PROCEDURE — 700111 HCHG RX REV CODE 636 W/ 250 OVERRIDE (IP): Performed by: INTERNAL MEDICINE

## 2020-07-25 PROCEDURE — 71045 X-RAY EXAM CHEST 1 VIEW: CPT

## 2020-07-25 PROCEDURE — 93306 TTE W/DOPPLER COMPLETE: CPT | Mod: 26 | Performed by: INTERNAL MEDICINE

## 2020-07-25 PROCEDURE — 94640 AIRWAY INHALATION TREATMENT: CPT

## 2020-07-25 PROCEDURE — 700117 HCHG RX CONTRAST REV CODE 255: Performed by: INTERNAL MEDICINE

## 2020-07-25 PROCEDURE — 93306 TTE W/DOPPLER COMPLETE: CPT

## 2020-07-25 PROCEDURE — 74018 RADEX ABDOMEN 1 VIEW: CPT

## 2020-07-25 PROCEDURE — 85025 COMPLETE CBC W/AUTO DIFF WBC: CPT

## 2020-07-25 PROCEDURE — A9270 NON-COVERED ITEM OR SERVICE: HCPCS | Performed by: INTERNAL MEDICINE

## 2020-07-25 PROCEDURE — 700101 HCHG RX REV CODE 250: Performed by: INTERNAL MEDICINE

## 2020-07-25 PROCEDURE — 82962 GLUCOSE BLOOD TEST: CPT | Mod: 91

## 2020-07-25 PROCEDURE — 700102 HCHG RX REV CODE 250 W/ 637 OVERRIDE(OP): Performed by: INTERNAL MEDICINE

## 2020-07-25 PROCEDURE — 700105 HCHG RX REV CODE 258: Performed by: INTERNAL MEDICINE

## 2020-07-25 PROCEDURE — 99291 CRITICAL CARE FIRST HOUR: CPT | Performed by: INTERNAL MEDICINE

## 2020-07-25 RX ORDER — POTASSIUM CHLORIDE 20 MEQ/1
40 TABLET, EXTENDED RELEASE ORAL ONCE
Status: DISCONTINUED | OUTPATIENT
Start: 2020-07-25 | End: 2020-07-25

## 2020-07-25 RX ORDER — SODIUM CHLORIDE 9 MG/ML
INJECTION, SOLUTION INTRAVENOUS CONTINUOUS
Status: DISCONTINUED | OUTPATIENT
Start: 2020-07-25 | End: 2020-07-26

## 2020-07-25 RX ORDER — POTASSIUM CHLORIDE 20 MEQ/1
40 TABLET, EXTENDED RELEASE ORAL ONCE
Status: COMPLETED | OUTPATIENT
Start: 2020-07-25 | End: 2020-07-25

## 2020-07-25 RX ADMIN — SODIUM CHLORIDE SOLN NEBU 3% 3 ML: 3 NEBU SOLN at 15:51

## 2020-07-25 RX ADMIN — SODIUM CHLORIDE SOLN NEBU 3% 3 ML: 3 NEBU SOLN at 18:33

## 2020-07-25 RX ADMIN — DOCUSATE SODIUM 50 MG AND SENNOSIDES 8.6 MG 2 TABLET: 8.6; 5 TABLET, FILM COATED ORAL at 05:29

## 2020-07-25 RX ADMIN — POLYETHYLENE GLYCOL 3350 1 PACKET: 17 POWDER, FOR SOLUTION ORAL at 05:29

## 2020-07-25 RX ADMIN — HEPARIN SODIUM 5000 UNITS: 5000 INJECTION, SOLUTION INTRAVENOUS; SUBCUTANEOUS at 14:23

## 2020-07-25 RX ADMIN — HEPARIN SODIUM 5000 UNITS: 5000 INJECTION, SOLUTION INTRAVENOUS; SUBCUTANEOUS at 05:29

## 2020-07-25 RX ADMIN — HUMAN ALBUMIN MICROSPHERES AND PERFLUTREN 3 ML: 10; .22 INJECTION, SOLUTION INTRAVENOUS at 15:13

## 2020-07-25 RX ADMIN — IPRATROPIUM BROMIDE AND ALBUTEROL SULFATE 3 ML: .5; 3 SOLUTION RESPIRATORY (INHALATION) at 18:33

## 2020-07-25 RX ADMIN — IPRATROPIUM BROMIDE AND ALBUTEROL SULFATE 3 ML: .5; 3 SOLUTION RESPIRATORY (INHALATION) at 15:51

## 2020-07-25 RX ADMIN — SODIUM CHLORIDE SOLN NEBU 3% 3 ML: 3 NEBU SOLN at 12:13

## 2020-07-25 RX ADMIN — POTASSIUM CHLORIDE 40 MEQ: 1500 TABLET, EXTENDED RELEASE ORAL at 11:12

## 2020-07-25 RX ADMIN — IPRATROPIUM BROMIDE AND ALBUTEROL SULFATE 3 ML: .5; 3 SOLUTION RESPIRATORY (INHALATION) at 12:12

## 2020-07-25 RX ADMIN — SODIUM CHLORIDE: 9 INJECTION, SOLUTION INTRAVENOUS at 11:10

## 2020-07-25 RX ADMIN — SODIUM CHLORIDE SOLN NEBU 3% 3 ML: 3 NEBU SOLN at 07:04

## 2020-07-25 RX ADMIN — IPRATROPIUM BROMIDE AND ALBUTEROL SULFATE 3 ML: .5; 3 SOLUTION RESPIRATORY (INHALATION) at 07:04

## 2020-07-25 ASSESSMENT — FIBROSIS 4 INDEX: FIB4 SCORE: 1.6

## 2020-07-25 ASSESSMENT — ENCOUNTER SYMPTOMS
HALLUCINATIONS: 0
FEVER: 0
HEADACHES: 0
FOCAL WEAKNESS: 0
SPUTUM PRODUCTION: 0
NAUSEA: 0
BLURRED VISION: 1
BACK PAIN: 0
DOUBLE VISION: 0
TREMORS: 0
SPEECH CHANGE: 0
VOMITING: 0
DIARRHEA: 0
COUGH: 1
SHORTNESS OF BREATH: 0
MYALGIAS: 0
DEPRESSION: 0
SEIZURES: 0
NERVOUS/ANXIOUS: 0
TINGLING: 0
ABDOMINAL PAIN: 0
STRIDOR: 0
CLAUDICATION: 0

## 2020-07-25 NOTE — PALLIATIVE CARE
Palliative Care follow-up  Lazaro discussed with MD and BS RN noting plan for MD to provide updates to [facility] today on results from labs/imaging. Plan for PC to f/u tomorrow (Sunday) with [facility] on POC.       Updated: PC team     Plan: f/u with son following updates from Dr. Maharaj    Thank you for allowing Palliative Care to support this patient and family. Contact [phone] for additional assistance, change in patient status, or with any questions/concerns.

## 2020-07-25 NOTE — PROGRESS NOTES
Pt SpO2 sustaining 82-85% despite max HFNC. RT notified and placing nonrebreather over HFNC. Pt didn't tolerate BiPAP earlier. SpO2 improving.

## 2020-07-25 NOTE — CARE PLAN
Pt on HHFNC 60L/100% attempted bi-pap to take pt to CT scan.  No change in oxygenation statues between HHFNC and Bi-PAP trailed 3% and patient coughed up thick secretions, continue with QID therapy. Pt holds PEP therapy device but doesn't actually participate in therapy.

## 2020-07-25 NOTE — CARE PLAN
Pt can not follow commands to perform PEP therapy. Continuing with mucolytics as coughing techniques.  Remains on HHFNC 60L/100%.
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
## 2020-07-26 ENCOUNTER — APPOINTMENT (OUTPATIENT)
Dept: RADIOLOGY | Facility: MEDICAL CENTER | Age: 70
DRG: 871 | End: 2020-07-26
Attending: INTERNAL MEDICINE
Payer: MEDICARE

## 2020-07-26 LAB
ANION GAP SERPL CALC-SCNC: 10 MMOL/L (ref 7–16)
BASOPHILS # BLD AUTO: 0.5 % (ref 0–1.8)
BASOPHILS # BLD: 0.07 K/UL (ref 0–0.12)
BUN SERPL-MCNC: 45 MG/DL (ref 8–22)
CALCIUM SERPL-MCNC: 9.9 MG/DL (ref 8.5–10.5)
CHLORIDE SERPL-SCNC: 112 MMOL/L (ref 96–112)
CO2 SERPL-SCNC: 27 MMOL/L (ref 20–33)
CREAT SERPL-MCNC: 1.08 MG/DL (ref 0.5–1.4)
EOSINOPHIL # BLD AUTO: 0.32 K/UL (ref 0–0.51)
EOSINOPHIL NFR BLD: 2.4 % (ref 0–6.9)
ERYTHROCYTE [DISTWIDTH] IN BLOOD BY AUTOMATED COUNT: 53.9 FL (ref 35.9–50)
GLUCOSE BLD-MCNC: 114 MG/DL (ref 65–99)
GLUCOSE BLD-MCNC: 142 MG/DL (ref 65–99)
GLUCOSE BLD-MCNC: 94 MG/DL (ref 65–99)
GLUCOSE SERPL-MCNC: 127 MG/DL (ref 65–99)
HCT VFR BLD AUTO: 45.2 % (ref 42–52)
HGB BLD-MCNC: 14 G/DL (ref 14–18)
IMM GRANULOCYTES # BLD AUTO: 0.14 K/UL (ref 0–0.11)
IMM GRANULOCYTES NFR BLD AUTO: 1 % (ref 0–0.9)
LYMPHOCYTES # BLD AUTO: 1.7 K/UL (ref 1–4.8)
LYMPHOCYTES NFR BLD: 12.5 % (ref 22–41)
MCH RBC QN AUTO: 29.8 PG (ref 27–33)
MCHC RBC AUTO-ENTMCNC: 31 G/DL (ref 33.7–35.3)
MCV RBC AUTO: 96.2 FL (ref 81.4–97.8)
MONOCYTES # BLD AUTO: 0.92 K/UL (ref 0–0.85)
MONOCYTES NFR BLD AUTO: 6.8 % (ref 0–13.4)
NEUTROPHILS # BLD AUTO: 10.4 K/UL (ref 1.82–7.42)
NEUTROPHILS NFR BLD: 76.8 % (ref 44–72)
NRBC # BLD AUTO: 0 K/UL
NRBC BLD-RTO: 0 /100 WBC
PLATELET # BLD AUTO: 245 K/UL (ref 164–446)
PMV BLD AUTO: 10.7 FL (ref 9–12.9)
POTASSIUM SERPL-SCNC: 4.3 MMOL/L (ref 3.6–5.5)
RBC # BLD AUTO: 4.7 M/UL (ref 4.7–6.1)
SODIUM SERPL-SCNC: 149 MMOL/L (ref 135–145)
WBC # BLD AUTO: 13.6 K/UL (ref 4.8–10.8)

## 2020-07-26 PROCEDURE — 700102 HCHG RX REV CODE 250 W/ 637 OVERRIDE(OP): Performed by: INTERNAL MEDICINE

## 2020-07-26 PROCEDURE — 85025 COMPLETE CBC W/AUTO DIFF WBC: CPT

## 2020-07-26 PROCEDURE — 99291 CRITICAL CARE FIRST HOUR: CPT | Performed by: INTERNAL MEDICINE

## 2020-07-26 PROCEDURE — 700111 HCHG RX REV CODE 636 W/ 250 OVERRIDE (IP): Performed by: INTERNAL MEDICINE

## 2020-07-26 PROCEDURE — 80048 BASIC METABOLIC PNL TOTAL CA: CPT

## 2020-07-26 PROCEDURE — 770022 HCHG ROOM/CARE - ICU (200)

## 2020-07-26 PROCEDURE — 82962 GLUCOSE BLOOD TEST: CPT

## 2020-07-26 PROCEDURE — A9270 NON-COVERED ITEM OR SERVICE: HCPCS | Performed by: INTERNAL MEDICINE

## 2020-07-26 PROCEDURE — 700105 HCHG RX REV CODE 258: Performed by: INTERNAL MEDICINE

## 2020-07-26 PROCEDURE — 94640 AIRWAY INHALATION TREATMENT: CPT

## 2020-07-26 PROCEDURE — 94669 MECHANICAL CHEST WALL OSCILL: CPT

## 2020-07-26 PROCEDURE — 700101 HCHG RX REV CODE 250: Performed by: INTERNAL MEDICINE

## 2020-07-26 RX ORDER — SODIUM CHLORIDE FOR INHALATION 7 %
4 VIAL, NEBULIZER (ML) INHALATION
Status: DISCONTINUED | OUTPATIENT
Start: 2020-07-26 | End: 2020-08-02

## 2020-07-26 RX ORDER — DEXTROSE AND SODIUM CHLORIDE 5; .45 G/100ML; G/100ML
INJECTION, SOLUTION INTRAVENOUS CONTINUOUS
Status: DISCONTINUED | OUTPATIENT
Start: 2020-07-26 | End: 2020-07-29

## 2020-07-26 RX ORDER — DILTIAZEM HYDROCHLORIDE 5 MG/ML
15 INJECTION INTRAVENOUS ONCE
Status: DISCONTINUED | OUTPATIENT
Start: 2020-07-26 | End: 2020-07-26

## 2020-07-26 RX ORDER — SODIUM CHLORIDE FOR INHALATION 7 %
4 VIAL, NEBULIZER (ML) INHALATION
Status: DISCONTINUED | OUTPATIENT
Start: 2020-07-26 | End: 2020-07-26

## 2020-07-26 RX ADMIN — DOCUSATE SODIUM 50 MG AND SENNOSIDES 8.6 MG 2 TABLET: 8.6; 5 TABLET, FILM COATED ORAL at 04:58

## 2020-07-26 RX ADMIN — ENOXAPARIN SODIUM 40 MG: 40 INJECTION SUBCUTANEOUS at 10:31

## 2020-07-26 RX ADMIN — IPRATROPIUM BROMIDE AND ALBUTEROL SULFATE 3 ML: .5; 3 SOLUTION RESPIRATORY (INHALATION) at 18:24

## 2020-07-26 RX ADMIN — HEPARIN SODIUM 5000 UNITS: 5000 INJECTION, SOLUTION INTRAVENOUS; SUBCUTANEOUS at 04:58

## 2020-07-26 RX ADMIN — SODIUM CHLORIDE: 9 INJECTION, SOLUTION INTRAVENOUS at 05:40

## 2020-07-26 RX ADMIN — SCOLOPAMINE TRANSDERMAL SYSTEM 1 PATCH: 1 PATCH, EXTENDED RELEASE TRANSDERMAL at 16:48

## 2020-07-26 RX ADMIN — DEXTROSE AND SODIUM CHLORIDE: 5; 450 INJECTION, SOLUTION INTRAVENOUS at 10:31

## 2020-07-26 RX ADMIN — SODIUM CHLORIDE SOLN NEBU 3% 3 ML: 3 NEBU SOLN at 09:57

## 2020-07-26 RX ADMIN — SODIUM CHLORIDE SOLN NEBU 3% 3 ML: 3 NEBU SOLN at 06:52

## 2020-07-26 RX ADMIN — IPRATROPIUM BROMIDE AND ALBUTEROL SULFATE 3 ML: .5; 3 SOLUTION RESPIRATORY (INHALATION) at 09:57

## 2020-07-26 RX ADMIN — IPRATROPIUM BROMIDE AND ALBUTEROL SULFATE 3 ML: .5; 3 SOLUTION RESPIRATORY (INHALATION) at 06:54

## 2020-07-26 RX ADMIN — SODIUM CHLORIDE 4 ML: 7 NEBU SOLN,3 % NEBU at 18:24

## 2020-07-26 ASSESSMENT — ENCOUNTER SYMPTOMS
TINGLING: 0
SEIZURES: 0
COUGH: 0
NERVOUS/ANXIOUS: 0
CLAUDICATION: 0
BACK PAIN: 0
STRIDOR: 0
DOUBLE VISION: 0
SHORTNESS OF BREATH: 0
HALLUCINATIONS: 0
FOCAL WEAKNESS: 0
ABDOMINAL PAIN: 0
TREMORS: 0
SPEECH CHANGE: 0
HEADACHES: 0
SPUTUM PRODUCTION: 0
VOMITING: 0
FEVER: 0
DEPRESSION: 0
NAUSEA: 0
DIARRHEA: 0
MYALGIAS: 0
BLURRED VISION: 1

## 2020-07-26 ASSESSMENT — FIBROSIS 4 INDEX: FIB4 SCORE: 1.73

## 2020-07-26 NOTE — CARE PLAN
Problem: Bowel/Gastric:  Goal: Normal bowel function is maintained or improved  Outcome: PROGRESSING AS EXPECTED  Note: Patient had BM x1 today.     Problem: Communication  Goal: The ability to communicate needs accurately and effectively will improve  Note: Plan of care discussed with MD, patients son, palliative care RN and this RN.

## 2020-07-26 NOTE — PROGRESS NOTES
Critical Care Progress Note    Date of admission  7/17/2020    Chief Complaint  69 y.o. male with reported history of CHF, right eye removal, history of severe facial trauma with plate repair, previous CVA, recent hospitalization for pneumonia, MRSA nares positive who presented 7/17/2020 as transfer from [facility] secondary to possible aspiration pneumonitis.  Patient was initially admitted there ?7/8 after being treated at [facility], [place] for pneumonia 6/28-7/7.  At OSH he was given Zosyn and Septra.  On arrival patient was on 15 L nonrebreather satting low 90s.  DNR - I okay    Hospital Course    7/18 remained on HFNC with increasing amount of oxygen requirement 100%/60L/min  7/19 started lasix 20 Q8H, diuresing well. Weaning down HFNC to 60%/50  7/20 continued lasix 20 q8H, abd xray with improvement of dilated bowel, minimal NG output. started trickle feed at 10cc/hr. HFNC back up to 80%  7/21 worsening hypoxia, intubated, bronched and found large amount of purulent secretions in kevin, right main stem and RLL, placed on norepi  7/22 self extubated, placed on HFNC discussion with son DNR/DNI  7/23 still on max HFNC without distress, stable vitals  7/24 still on HFNC and trial on Bipap, CT chest preformed, IV fluids given back  7/25 stuck on HFNC, long discussion with son patient would not want peg tube and would not want this type of care, family is going to come see patient palliative care following, KUB with constipation      Interval Problem Update  Reviewed last 24 hour events:  Neuro: aox4 no pain   HR: 70-80's  SBP: 100-120's  Tmax: afebrile  GI: cortrax coiling,  BM last night   UOP: good   Lines: zarate peripheral IV NS at 50ml/hr   Resp: HFNC 60% 80L, inc treatment 7%   Vte: lovenox  PPI/H2:n/a  Antibx: none    Dropped flow to 80l 60%  D5 1/2NS      Review of Systems  Review of Systems   Constitutional: Negative for fever and malaise/fatigue.   HENT: Negative for congestion.     Eyes: Positive for blurred vision. Negative for double vision.   Respiratory: Negative for cough, sputum production, shortness of breath and stridor.    Cardiovascular: Negative for chest pain, claudication and leg swelling.   Gastrointestinal: Negative for abdominal pain, diarrhea, nausea and vomiting.   Genitourinary: Negative for dysuria and hematuria.   Musculoskeletal: Negative for back pain, joint pain and myalgias.   Neurological: Negative for tingling, tremors, speech change, focal weakness, seizures and headaches.   Psychiatric/Behavioral: Negative for depression and hallucinations. The patient is not nervous/anxious.         Vital Signs for last 24 hours   Temp:  [36.3 °C (97.3 °F)-36.4 °C (97.5 °F)] 36.3 °C (97.3 °F)  Pulse:  [68-87] 74  Resp:  [14-39] 39  BP: (100-128)/(53-70) 116/53  SpO2:  [0 %-97 %] 95 %    Hemodynamic parameters for last 24 hours       Respiratory Information for the last 24 hours       Physical Exam   Physical Exam  Vitals signs and nursing note reviewed.   Constitutional:       General: He is not in acute distress.     Appearance: He is ill-appearing. He is not toxic-appearing or diaphoretic.      Comments: Frail elderly male, malnurished on HFNC   HENT:      Head: Normocephalic and atraumatic.      Nose:      Comments: High flow device in nares     Mouth/Throat:      Mouth: Mucous membranes are moist.      Comments: Increased oral secretions  Eyes:      General: No scleral icterus.        Right eye: No discharge.         Left eye: No discharge.      Extraocular Movements: Extraocular movements intact.      Conjunctiva/sclera: Conjunctivae normal.      Comments: Sunken in eyes   Neck:      Musculoskeletal: Neck supple.   Cardiovascular:      Rate and Rhythm: Normal rate and regular rhythm.      Pulses: Normal pulses.      Heart sounds: Normal heart sounds. No murmur.   Pulmonary:      Effort: Pulmonary effort is normal. No respiratory distress.      Breath sounds: No stridor. No  wheezing, rhonchi or rales.      Comments: Better air movement without wheezing rales work of breathing stable  Abdominal:      General: Bowel sounds are normal. There is no distension.      Palpations: Abdomen is soft.      Tenderness: There is no abdominal tenderness. There is no guarding.   Musculoskeletal:         General: No swelling or tenderness.      Right lower leg: Edema present.      Left lower leg: Edema present.      Comments: Trace edema to sacrum and bilateral lower   Skin:     General: Skin is warm and dry.      Capillary Refill: Capillary refill takes 2 to 3 seconds.      Coloration: Skin is not jaundiced.   Neurological:      General: No focal deficit present.      Mental Status: He is alert.      Cranial Nerves: No cranial nerve deficit.      Comments: AOX3, moves all extremities to commands.    Psychiatric:         Mood and Affect: Mood normal.         Medications  Current Facility-Administered Medications   Medication Dose Route Frequency Provider Last Rate Last Dose   • D5 1/2 NS infusion   Intravenous Continuous Jeffry Maharaj M.D.       • enoxaparin (LOVENOX) inj 40 mg  40 mg Subcutaneous DAILY Jeffry Maharaj M.D.       • sodium chloride (HYPER-SAL) 7 % nebulizer solution 4 mL  4 mL Nebulization BID (RT) Jeffry Maharaj M.D.       • senna-docusate (PERICOLACE or SENOKOT S) 8.6-50 MG per tablet 2 Tab  2 Tab Enteral Tube BID Jeffry Maharaj M.D.   2 Tab at 07/26/20 0458    And   • polyethylene glycol/lytes (MIRALAX) PACKET 1 Packet  1 Packet Enteral Tube QDAY PRN Jeffry Maharaj M.D.   1 Packet at 07/25/20 0529    And   • magnesium hydroxide (MILK OF MAGNESIA) suspension 30 mL  30 mL Enteral Tube QDAY PRN Jeffry Maharaj M.D.        And   • bisacodyl (DULCOLAX) suppository 10 mg  10 mg Rectal QDAY PRN Jeffry Maharaj M.D.   10 mg at 07/24/20 1546   • ondansetron (ZOFRAN ODT) dispertab 4 mg  4 mg Enteral Tube Q4HRS PRN Jeffry Maharaj M.D.       • ipratropium-albuterol (DUONEB)  nebulizer solution  3 mL Nebulization 4X/DAY (RT) Jeffry Maharaj M.D.   3 mL at 07/26/20 0957   • Respiratory Therapy Consult   Nebulization Continuous RT Ye Carson D.O.       • ipratropium-albuterol (DUONEB) nebulizer solution  3 mL Nebulization Q2HRS PRN (RT) Ye Carson D.O.   3 mL at 07/24/20 1438   • scopolamine (TRANSDERM-SCOP) patch 1 Patch  1 Patch Transdermal Q72HRS Ye Carson D.O.   1 Patch at 07/23/20 1619   • labetalol (NORMODYNE/TRANDATE) injection 10 mg  10 mg Intravenous Q4HRS PRN Jeffry Dominguez M.D.       • ondansetron (ZOFRAN) syringe/vial injection 4 mg  4 mg Intravenous Q4HRS PRN Jeffry Dominguez M.D.       • insulin regular (HumuLIN R,NovoLIN R) injection  1-6 Units Subcutaneous Q6HRS Jeffry Dominguez M.D.   Stopped at 07/17/20 2210    And   • dextrose 50% (D50W) injection 50 mL  50 mL Intravenous Q15 MIN PRN Jeffry Dominguez M.D.   50 mL at 07/19/20 2354       Fluids    Intake/Output Summary (Last 24 hours) at 7/26/2020 1021  Last data filed at 7/26/2020 0600  Gross per 24 hour   Intake 1801.67 ml   Output 1075 ml   Net 726.67 ml       Laboratory  Recent Labs     07/23/20  1039   ISTATTEMP see below   ISTATFIO2 100   ISTATSPEC Venous         Recent Labs     07/24/20  0530 07/25/20  0526 07/26/20  0505   SODIUM 145 144 149*   POTASSIUM 3.6 3.8 4.3   CHLORIDE 99 104 112   CO2 32 30 27   BUN 39* 44* 45*   CREATININE 1.54* 1.49* 1.08   MAGNESIUM 2.4  --   --    PHOSPHORUS 3.5  --   --    CALCIUM 10.0 10.1 9.9     Recent Labs     07/24/20  0530 07/25/20  0526 07/26/20  0505   GLUCOSE 128* 151* 127*     Recent Labs     07/24/20  0530 07/25/20  0526 07/26/20  0505   WBC 11.7* 12.7* 13.6*   NEUTSPOLYS 71.20 75.20* 76.80*   LYMPHOCYTES 14.50* 12.60* 12.50*   MONOCYTES 8.20 7.10 6.80   EOSINOPHILS 3.80 2.90 2.40   BASOPHILS 0.90 0.60 0.50     Recent Labs     07/24/20  0530 07/25/20  0526 07/26/20  0505   RBC 5.13 4.99 4.70   HEMOGLOBIN 15.2 15.0 14.0   HEMATOCRIT 49.0 47.6 45.2   PLATELETCT 265  300 456       Imaging  X-Ray:  I have personally reviewed the images and compared with prior images. and reviwed      Microbiology  7/18 COVID/SARS CoV2 PCR negative  7/18 BCx 2/2 negative to date    Assessment/Plan  * Acute respiratory failure with hypoxia (HCC)  Assessment & Plan  Due to aspiration pneumonia/pneumonitis  CTA from OSH per report no PE, BB ATX versus infiltrate  7/18 COVID SARS CoV2 PCR negative  7/18 Sputum Cx gram stain GPC, Cx pending  7/20 started on scopolamine patch due to increased oral secretions  7/21 intubated, bronched - large amount purulent secretions  7/22 self-extubated  Since he has been on HFNC 100%, 60L/min    Plan;   Continue HFNC, keep sat >88% -> trial of Bipap 7/24   CTA chest reviewed, Echo with bubble study  DNR/DNI  Continue piptazo, treat for total 7 days (end date 7/24)  Mobility, PT following  Ct with dense right lower lobe consolidation s/p pna treatment   Hypertonic saline and dounebs  Ricardo Condon discussing with family to come see him/face time since his dad would not want this level of care.       Goals of care, counseling/discussion  Assessment & Plan  Palliative care following  Yousif describes his dad typically avoid health care and wouldn't any of this current level of care current getting family to come in and say goodbye vs trying to get back to [facility] and then plan towards focusing on his comfort   DNR/DNI  If he was to worsen Taurus would like to transitioning to comfort measures    SBO (small bowel obstruction) (HCC)  Assessment & Plan  7/16 CT abdomen pelvis at OSH was personally reviewed and noted diffuse dilated small bowel.   Since admisison, abdomen has been soft, while on NG tube.   7/20 Started on tube feed trickle feed, tolerating at 10cc/hr.   7/21 increased tube feed to 20cc/hr -> stop 7/22    Plan:   Given he's very tenous with resp status, will hold tube feed for now -> patient removed cortrax -> restart TF 7/24   Continue to discuss with family  goals of care  Abdominal exam soft no inguinal hernia or tenderness    Check KUB 7/25 -> constipated  Daily suppository aggressive bowel regime      Lactic acidosis  Assessment & Plan  Multifactorial possible sepsis +/- respiratory distress +/- any transient ischemia from reported inguinal hernia   Resolved      KORIN (acute kidney injury) (HCC)  Assessment & Plan  Creatinine normalized  CT abdomen pelvis from OSH did not mention hydronephrosis or obstruction.   Monitor urine output  Avoid nephrotoxins  Gentle hydration       Palliative care has been consulted to discuss regarding goal of care. Patient is DNR/I ok    VTE:  Lovenox  Ulcer: Not Indicated  Lines: Esteves Catheter  Ongoing indication addressed    I have performed a physical exam and reviewed and updated ROS and Plan today (7/26/2020). In review of yesterday's note (7/25/2020), there are no changes except as documented above.     Discussed patient condition and risk of morbidity and/or mortality with RN, RT, Pharmacy, Charge nurse / hot rounds and Patient     The patient remains critically ill on near max HFNC with active titration.  Critical care time = 37 minutes in directly providing and coordinating critical care and extensive data review.  No time overlap and excludes procedures.

## 2020-07-26 NOTE — PALLIATIVE CARE
Palliative Care follow-up  PC RN met with MD, BS RN and son in the conference room. PC received updates on the nature of the conversation up to this point including the patient's expressed wishes of not wanting aggressive medical treatment and this being beyond what he would want for himself. Taurus expressed the plan of alerting other family of the situation and probable transition to CC following visits/Zoom meetings with the patient's siblings out of state. Taurus denied any other medical questions and BS team excused. PC stayed with Taurus and answered some other questions regarding what the transition off the HFNC looks like per his request. PC explained the use of morphine to relax his work of breathing and titrating the O2 down. PC explained the goal it not to hasten his death but rather ensure he is comfortable and allow the natural progression of EoL to occur; he concurred. PC provided him with this RNs number for any questions/concerns and he was appreciative.       Updated: BS team present    Plan: f/u with Taurus in coming days on plan; will assist with Zoom as needed    Thank you for allowing Palliative Care to support this patient and family. Contact [phone] for additional assistance, change in patient status, or with any questions/concerns.

## 2020-07-26 NOTE — CARE PLAN
Problem: Communication  Goal: The ability to communicate needs accurately and effectively will improve  Outcome: PROGRESSING AS EXPECTED  -Patient encouraged to voice concerns/ frequent rounding prompts patient to communicate needs     Problem: Safety  Goal: Will remain free from injury  Outcome: PROGRESSING AS EXPECTED     Problem: Bowel/Gastric:  Goal: Normal bowel function is maintained or improved  Outcome: PROGRESSING AS EXPECTED  - Patient had 1 bowel movement throughout shift.

## 2020-07-26 NOTE — CARE PLAN
Problem: Oxygenation:  Goal: Maintain adequate oxygenation dependent on patient condition  Outcome: PROGRESSING SLOWER THAN EXPECTED   60L 100%  Duo Q4  7% Q4

## 2020-07-27 ENCOUNTER — APPOINTMENT (OUTPATIENT)
Dept: RADIOLOGY | Facility: MEDICAL CENTER | Age: 70
DRG: 871 | End: 2020-07-27
Attending: INTERNAL MEDICINE
Payer: MEDICARE

## 2020-07-27 PROBLEM — E87.0 HYPERNATREMIA: Status: ACTIVE | Noted: 2020-07-27

## 2020-07-27 PROBLEM — J69.0 ASPIRATION PNEUMONIA (HCC): Status: ACTIVE | Noted: 2020-07-27

## 2020-07-27 LAB
ALBUMIN SERPL BCP-MCNC: 3.6 G/DL (ref 3.2–4.9)
ALBUMIN/GLOB SERPL: 1 G/DL
ALP SERPL-CCNC: 41 U/L (ref 30–99)
ALT SERPL-CCNC: 9 U/L (ref 2–50)
ANION GAP SERPL CALC-SCNC: 13 MMOL/L (ref 7–16)
ANISOCYTOSIS BLD QL SMEAR: ABNORMAL
AST SERPL-CCNC: 11 U/L (ref 12–45)
BASOPHILS # BLD AUTO: 0.4 % (ref 0–1.8)
BASOPHILS # BLD: 0.07 K/UL (ref 0–0.12)
BILIRUB SERPL-MCNC: 0.4 MG/DL (ref 0.1–1.5)
BUN SERPL-MCNC: 39 MG/DL (ref 8–22)
CALCIUM SERPL-MCNC: 10 MG/DL (ref 8.5–10.5)
CHLORIDE SERPL-SCNC: 110 MMOL/L (ref 96–112)
CO2 SERPL-SCNC: 25 MMOL/L (ref 20–33)
COMMENT 1642: NORMAL
CREAT SERPL-MCNC: 1.07 MG/DL (ref 0.5–1.4)
EOSINOPHIL # BLD AUTO: 0.31 K/UL (ref 0–0.51)
EOSINOPHIL NFR BLD: 2 % (ref 0–6.9)
ERYTHROCYTE [DISTWIDTH] IN BLOOD BY AUTOMATED COUNT: 55.4 FL (ref 35.9–50)
GLOBULIN SER CALC-MCNC: 3.7 G/DL (ref 1.9–3.5)
GLUCOSE BLD-MCNC: 101 MG/DL (ref 65–99)
GLUCOSE BLD-MCNC: 105 MG/DL (ref 65–99)
GLUCOSE BLD-MCNC: 109 MG/DL (ref 65–99)
GLUCOSE BLD-MCNC: 93 MG/DL (ref 65–99)
GLUCOSE BLD-MCNC: 94 MG/DL (ref 65–99)
GLUCOSE SERPL-MCNC: 103 MG/DL (ref 65–99)
HCT VFR BLD AUTO: 45.8 % (ref 42–52)
HGB BLD-MCNC: 13.7 G/DL (ref 14–18)
IMM GRANULOCYTES # BLD AUTO: 0.17 K/UL (ref 0–0.11)
IMM GRANULOCYTES NFR BLD AUTO: 1.1 % (ref 0–0.9)
LYMPHOCYTES # BLD AUTO: 1.93 K/UL (ref 1–4.8)
LYMPHOCYTES NFR BLD: 12.3 % (ref 22–41)
MACROCYTES BLD QL SMEAR: ABNORMAL
MCH RBC QN AUTO: 29 PG (ref 27–33)
MCHC RBC AUTO-ENTMCNC: 29.9 G/DL (ref 33.7–35.3)
MCV RBC AUTO: 96.8 FL (ref 81.4–97.8)
MONOCYTES # BLD AUTO: 0.93 K/UL (ref 0–0.85)
MONOCYTES NFR BLD AUTO: 5.9 % (ref 0–13.4)
MORPHOLOGY BLD-IMP: NORMAL
NEUTROPHILS # BLD AUTO: 12.26 K/UL (ref 1.82–7.42)
NEUTROPHILS NFR BLD: 78.3 % (ref 44–72)
NRBC # BLD AUTO: 0 K/UL
NRBC BLD-RTO: 0 /100 WBC
PLATELET # BLD AUTO: 242 K/UL (ref 164–446)
PLATELET BLD QL SMEAR: NORMAL
PMV BLD AUTO: 11.2 FL (ref 9–12.9)
POTASSIUM SERPL-SCNC: 4.4 MMOL/L (ref 3.6–5.5)
PROT SERPL-MCNC: 7.3 G/DL (ref 6–8.2)
RBC # BLD AUTO: 4.73 M/UL (ref 4.7–6.1)
RBC BLD AUTO: PRESENT
SODIUM SERPL-SCNC: 148 MMOL/L (ref 135–145)
WBC # BLD AUTO: 15.7 K/UL (ref 4.8–10.8)

## 2020-07-27 PROCEDURE — 770022 HCHG ROOM/CARE - ICU (200)

## 2020-07-27 PROCEDURE — 82962 GLUCOSE BLOOD TEST: CPT | Mod: 91

## 2020-07-27 PROCEDURE — 80053 COMPREHEN METABOLIC PANEL: CPT

## 2020-07-27 PROCEDURE — 99291 CRITICAL CARE FIRST HOUR: CPT | Performed by: INTERNAL MEDICINE

## 2020-07-27 PROCEDURE — 94640 AIRWAY INHALATION TREATMENT: CPT

## 2020-07-27 PROCEDURE — 85025 COMPLETE CBC W/AUTO DIFF WBC: CPT

## 2020-07-27 PROCEDURE — 700105 HCHG RX REV CODE 258: Performed by: INTERNAL MEDICINE

## 2020-07-27 PROCEDURE — 700111 HCHG RX REV CODE 636 W/ 250 OVERRIDE (IP): Performed by: INTERNAL MEDICINE

## 2020-07-27 PROCEDURE — 700101 HCHG RX REV CODE 250: Performed by: INTERNAL MEDICINE

## 2020-07-27 RX ADMIN — IPRATROPIUM BROMIDE AND ALBUTEROL SULFATE 3 ML: .5; 3 SOLUTION RESPIRATORY (INHALATION) at 10:51

## 2020-07-27 RX ADMIN — ENOXAPARIN SODIUM 40 MG: 40 INJECTION SUBCUTANEOUS at 05:40

## 2020-07-27 RX ADMIN — SODIUM CHLORIDE 4 ML: 7 NEBU SOLN,3 % NEBU at 14:14

## 2020-07-27 RX ADMIN — IPRATROPIUM BROMIDE AND ALBUTEROL SULFATE 3 ML: .5; 3 SOLUTION RESPIRATORY (INHALATION) at 14:14

## 2020-07-27 RX ADMIN — IPRATROPIUM BROMIDE AND ALBUTEROL SULFATE 3 ML: .5; 3 SOLUTION RESPIRATORY (INHALATION) at 18:41

## 2020-07-27 RX ADMIN — DEXTROSE AND SODIUM CHLORIDE: 5; 450 INJECTION, SOLUTION INTRAVENOUS at 05:40

## 2020-07-27 RX ADMIN — SODIUM CHLORIDE 4 ML: 7 NEBU SOLN,3 % NEBU at 18:41

## 2020-07-27 RX ADMIN — SODIUM CHLORIDE 4 ML: 7 NEBU SOLN,3 % NEBU at 10:51

## 2020-07-27 ASSESSMENT — ENCOUNTER SYMPTOMS
CHILLS: 0
DEPRESSION: 0
BLURRED VISION: 1
FLANK PAIN: 0
SORE THROAT: 0
BLOOD IN STOOL: 0
HEADACHES: 0
NERVOUS/ANXIOUS: 0
DIAPHORESIS: 0
DOUBLE VISION: 0
FEVER: 0
SHORTNESS OF BREATH: 0
DIARRHEA: 0
NAUSEA: 1
FOCAL WEAKNESS: 0
SENSORY CHANGE: 0
VOMITING: 0
STRIDOR: 0
COUGH: 0
PALPITATIONS: 0
ABDOMINAL PAIN: 0
SPEECH CHANGE: 0
SPUTUM PRODUCTION: 0
CLAUDICATION: 0
BACK PAIN: 0

## 2020-07-27 ASSESSMENT — FIBROSIS 4 INDEX: FIB4 SCORE: 1.73

## 2020-07-27 ASSESSMENT — PATIENT HEALTH QUESTIONNAIRE - PHQ9
SUM OF ALL RESPONSES TO PHQ9 QUESTIONS 1 AND 2: 0
1. LITTLE INTEREST OR PLEASURE IN DOING THINGS: NOT AT ALL
2. FEELING DOWN, DEPRESSED, IRRITABLE, OR HOPELESS: NOT AT ALL

## 2020-07-27 NOTE — ASSESSMENT & PLAN NOTE
Status post 7 days antibiotics-Zosyn-completed 7/24  Persisting hypoxemia finally better, query component of ARDS/atelectasis  Imaging revealed dense pneumonic opacities right lung/ATX  Aggressive pulmonary toilet as tolerated  Mobilize as tolerated, hypoxemia has been a problem  Nebulized treatments with 7% saline still appears to be helping  Continue enteral mucolytic's, patient pulled his feeding tube again but passed swallow eval

## 2020-07-27 NOTE — ASSESSMENT & PLAN NOTE
Remains mildly elevated  Afebrile and off antibiotics status post 1 week of Zosyn, complete 7/24  Recheck chest x-ray as needed  Daily ID ROS

## 2020-07-27 NOTE — PROGRESS NOTES
Critical Care Progress Note    Date of admission  7/17/2020    Chief Complaint  69 y.o. male with reported history of CHF, right eye removal, history of severe facial trauma with plate repair, previous CVA, recent hospitalization for pneumonia, MRSA nares positive who presented 7/17/2020 as transfer from [facility] secondary to possible aspiration pneumonitis.  Patient was initially admitted there ?7/8 after being treated at [facility] for pneumonia 6/28-7/7.  At OSH he was given Zosyn and Septra.  On arrival patient was on 15 L nonrebreather satting low 90s.  DNR - I okay    Hospital Course   7/18 remained on HFNC with increasing amount of oxygen requirement 100%/60L/min  7/19 started lasix 20 Q8H, diuresing well. Weaning down HFNC to 60%/50  7/20 continued lasix 20 q8H, abd xray with improvement of dilated bowel, minimal NG output. started trickle feed at 10cc/hr. HFNC back up to 80%  7/21 worsening hypoxia, intubated, bronched and found large amount of purulent secretions in kevin, right main stem and RLL, placed on norepi  7/22 self extubated, placed on HFNC discussion with son DNR/DNI  7/23 still on max HFNC without distress, stable vitals  7/24 still on HFNC and trial on Bipap, CT chest preformed, IV fluids given back  7/25 stuck on HFNC, long discussion with son patient would not want peg tube and would not want this type of care, family is going to come see patient palliative care following, KUB with constipation  7/26 Remains on HFNC, O2 weaned a small amt, pulm toilet adjusted      Interval Problem Update  Reviewed last 24 hour events:    A&O x4  Wants water - failed swallow eval  SR 60s  SBp 100s  UO adequate  Tm 99.1  Pulled cortrak  HF NC 60/100%  DuoNeb & 7%  Lovenox  Off ABX - s/p 7 days Zosyn off 2-3 days  D5 1/2 NS      Cortrak vs swallow eval  Mobilize - pulm toilet  Wean O2  PT & OT  Oral care  Pall care and Fam conf  CXR       YESTERDAY  Neuro: aox4 no pain   HR:  70-80's  SBP: 100-120's  Tmax: afebrile  GI: cortrax coiling,  BM last night   UOP: good   Lines: zarate peripheral IV NS at 50ml/hr   Resp: HFNC 60% 80L, inc treatment 7%   Vte: lovenox  PPI/H2:n/a  Antibx: none      Dropped flow to 80l 60%  D5 1/2NS      Review of Systems  Review of Systems   Constitutional: Negative for chills, diaphoresis, fever and malaise/fatigue.   HENT: Negative for congestion and sore throat.    Eyes: Positive for blurred vision. Negative for double vision.   Respiratory: Negative for cough, sputum production, shortness of breath and stridor.    Cardiovascular: Negative for chest pain, palpitations, claudication and leg swelling.   Gastrointestinal: Positive for nausea. Negative for abdominal pain, blood in stool, diarrhea and vomiting.   Genitourinary: Negative for dysuria, flank pain and hematuria.   Musculoskeletal: Negative for back pain.   Neurological: Negative for sensory change, speech change, focal weakness and headaches.   Psychiatric/Behavioral: Negative for depression. The patient is not nervous/anxious.         Vital Signs for last 24 hours   Temp:  [36.4 °C (97.5 °F)] 36.4 °C (97.5 °F)  Pulse:  [63-97] 75  Resp:  [19-38] 21  BP: (100-119)/(55-64) 118/55  SpO2:  [82 %-97 %] 95 %    Hemodynamic parameters for last 24 hours       Respiratory Information for the last 24 hours       Physical Exam   Physical Exam  Vitals signs and nursing note reviewed.   Constitutional:       General: He is not in acute distress.     Appearance: He is normal weight. He is ill-appearing. He is not toxic-appearing or diaphoretic.      Comments: Frail elderly male, malnurished and remains on HFNC   HENT:      Head: Normocephalic and atraumatic.      Nose:      Comments: High flow device in nares     Mouth/Throat:      Mouth: Mucous membranes are moist.      Comments: Increased oral secretions  Eyes:      General: No scleral icterus.        Right eye: No discharge.         Left eye: No discharge.       Extraocular Movements: Extraocular movements intact.      Conjunctiva/sclera: Conjunctivae normal.      Comments: Sunken in eyes   Neck:      Musculoskeletal: Neck supple. No neck rigidity.   Cardiovascular:      Rate and Rhythm: Normal rate and regular rhythm.  No extrasystoles are present.     Pulses: Normal pulses.      Heart sounds: Normal heart sounds. No murmur. No gallop.       Comments: SR  Pulmonary:      Effort: Pulmonary effort is normal. No respiratory distress.      Breath sounds: No stridor. No wheezing, rhonchi or rales.      Comments: Better air movement without wheezing rales work of breathing stable  Abdominal:      General: Bowel sounds are normal. There is no distension.      Palpations: Abdomen is soft.      Tenderness: There is no abdominal tenderness. There is no right CVA tenderness, left CVA tenderness or guarding.   Musculoskeletal:         General: No swelling or tenderness.      Right lower leg: Edema present.      Left lower leg: Edema present.      Comments: Trace edema to sacrum and bilateral lower   Lymphadenopathy:      Cervical: No cervical adenopathy.   Skin:     General: Skin is warm and dry.      Capillary Refill: Capillary refill takes 2 to 3 seconds.      Coloration: Skin is not cyanotic or jaundiced.      Nails: There is no clubbing.     Neurological:      General: No focal deficit present.      Mental Status: He is alert.      Cranial Nerves: No cranial nerve deficit.      Comments: AOX3, moves all extremities to commands/follows well.    Psychiatric:         Mood and Affect: Mood normal.         Medications  Current Facility-Administered Medications   Medication Dose Route Frequency Provider Last Rate Last Dose   • D5 1/2 NS infusion   Intravenous Continuous Jeffry Maharaj M.D. 50 mL/hr at 07/27/20 0540     • enoxaparin (LOVENOX) inj 40 mg  40 mg Subcutaneous DAILY Jeffry Maharaj M.D.   40 mg at 07/27/20 0540   • sodium chloride (HYPER-SAL) 7 % nebulizer solution 4 mL   4 mL Nebulization 4X/DAY (RT) Jeffry Maharaj M.D.   4 mL at 07/27/20 1414   • senna-docusate (PERICOLACE or SENOKOT S) 8.6-50 MG per tablet 2 Tab  2 Tab Enteral Tube BID Jeffry Maharaj M.D.   Stopped at 07/26/20 1800    And   • polyethylene glycol/lytes (MIRALAX) PACKET 1 Packet  1 Packet Enteral Tube QDAY PRN Jeffry Maharaj M.D.   1 Packet at 07/25/20 0529    And   • magnesium hydroxide (MILK OF MAGNESIA) suspension 30 mL  30 mL Enteral Tube QDAY PRN Jeffry Maharaj M.D.        And   • bisacodyl (DULCOLAX) suppository 10 mg  10 mg Rectal QDAY PRN Jeffry Maharaj M.D.   10 mg at 07/24/20 1546   • ondansetron (ZOFRAN ODT) dispertab 4 mg  4 mg Enteral Tube Q4HRS PRN Jeffry Maharaj M.D.       • ipratropium-albuterol (DUONEB) nebulizer solution  3 mL Nebulization 4X/DAY (RT) Jeffry Maharaj M.D.   3 mL at 07/27/20 1414   • Respiratory Therapy Consult   Nebulization Continuous RT Ye Carson D.O.       • ipratropium-albuterol (DUONEB) nebulizer solution  3 mL Nebulization Q2HRS PRN (RT) Ye Carson D.O.   3 mL at 07/24/20 1438   • scopolamine (TRANSDERM-SCOP) patch 1 Patch  1 Patch Transdermal Q72HRS CHRIS CherryOApoorva   1 Patch at 07/26/20 1648   • labetalol (NORMODYNE/TRANDATE) injection 10 mg  10 mg Intravenous Q4HRS PRN Jeffry Dominguez M.D.       • ondansetron (ZOFRAN) syringe/vial injection 4 mg  4 mg Intravenous Q4HRS PRN Jeffry Dominguez M.D.       • insulin regular (HumuLIN R,NovoLIN R) injection  1-6 Units Subcutaneous Q6HRS Jeffry Dominguez M.D.   Stopped at 07/17/20 2210    And   • dextrose 50% (D50W) injection 50 mL  50 mL Intravenous Q15 MIN PRN Jeffry Dominguez M.D.   50 mL at 07/19/20 2354       Fluids    Intake/Output Summary (Last 24 hours) at 7/27/2020 1437  Last data filed at 7/27/2020 0800  Gross per 24 hour   Intake 1100 ml   Output 885 ml   Net 215 ml       Laboratory          Recent Labs     07/25/20  0526 07/26/20  0505 07/27/20  0542   SODIUM 144 149* 148*   POTASSIUM 3.8 4.3 4.4    CHLORIDE 104 112 110   CO2 30 27 25   BUN 44* 45* 39*   CREATININE 1.49* 1.08 1.07   CALCIUM 10.1 9.9 10.0     Recent Labs     07/25/20  0526 07/26/20  0505 07/27/20  0542   ALTSGPT  --   --  9   ASTSGOT  --   --  11*   ALKPHOSPHAT  --   --  41   TBILIRUBIN  --   --  0.4   GLUCOSE 151* 127* 103*     Recent Labs     07/25/20  0526 07/26/20  0505 07/27/20  0542   WBC 12.7* 13.6* 15.7*   NEUTSPOLYS 75.20* 76.80* 78.30*   LYMPHOCYTES 12.60* 12.50* 12.30*   MONOCYTES 7.10 6.80 5.90   EOSINOPHILS 2.90 2.40 2.00   BASOPHILS 0.60 0.50 0.40   ASTSGOT  --   --  11*   ALTSGPT  --   --  9   ALKPHOSPHAT  --   --  41   TBILIRUBIN  --   --  0.4     Recent Labs     07/25/20  0526 07/26/20  0505 07/27/20  0542   RBC 4.99 4.70 4.73   HEMOGLOBIN 15.0 14.0 13.7*   HEMATOCRIT 47.6 45.2 45.8   PLATELETCT 275 245 242       Imaging  X-Ray:  I have personally reviewed the images and compared with prior images. and reviwed      Microbiology  7/18 COVID/SARS CoV2 PCR negative  7/18 BCx 2/2 negative to date    Assessment/Plan  * Acute respiratory failure with hypoxia (HCC)  Assessment & Plan  Due to aspiration pneumonia/pneumonitis  CTA from OSH per report no PE, BB ATX versus infiltrate  Component of atelectasis on x-ray as well    7/18 COVID SARS CoV2 PCR negative  7/18 Sputum Cx gram stain GPC, Cx pending  7/20 started on scopolamine patch due to increased oral secretions  7/21 intubated, bronched - large amount purulent secretions  7/22 self-extubated  Since he has been on HFNC 100%, 60L/min    Plan;   Continue HFNC, keep sat >88% -> trial of Bipap 7/24   CTA chest reviewed, Echo with bubble study  DNR/DNI  Mobility, PT following  CT with dense RLL consolidation s/p PNA treatment -7 days Zosyn-bleed 7/24  Hypertonic saline 7% and DuoNeb 4 times daily and as needed  Mobilize- incentive spirometry  Consider mucolytic's  Son Taurus discussing with family to come see him/face time since his dad would not want this level of care.        Aspiration pneumonia (HCC)  Assessment & Plan  Status post 7 days antibiotics-Zosyn-completed 7/24  Persisting hypoxemia, query component of ARDS/atelectasis  Imaging revealed dense pneumonic opacities right lung  Aggressive pulmonary toilet as tolerated  Mobilize as tolerated, hypoxemia has been a problem  Nebulized treatments with 7% saline  Consider enteral mucolytic's, patient pulled his feeding tube    KORIN (acute kidney injury) ([facility])  Assessment & Plan  Creatinine normalized, continue serial BMP  CT abdomen pelvis from OSH did not mention hydronephrosis or obstruction.   Monitor urine output  Avoid nephrotoxins  Gentle hydration    Leukocytosis  Assessment & Plan  Slightly trending up  Afebrile and off antibiotics  Recheck chest x-ray    SBO (small bowel obstruction) ([facility])  Assessment & Plan  7/16 CT abdomen pelvis at OSH was personally reviewed and noted diffuse dilated small bowel.   Since admisison, abdomen has been soft, while on NG tube.   7/20 Started on tube feed trickle feed, tolerating at 10cc/hr.   7/21 increased tube feed to 20cc/hr -> stop 7/22    Plan:   Given he's very tenous with resp status, will hold tube feed for now -> patient removed cortrax -> restart TF 7/24   Patient pulled cortrak again late 7/26, replaced 7/27  Continue to discuss with family goals of care, patient is capable of discussing issues as well  Abdominal exam remains soft no hernia or tenderness    Check KUB 7/25 -> constipated  Daily suppository aggressive bowel regime  Mobilize as oxygenation allows, may need to put mask on top of high flow nasal cannula      Hypernatremia  Assessment & Plan  Mild, improved, sodium 148  Running dry with severe hypoxemia  Gentle free water administration  Serial BMP    Goals of care, counseling/discussion  Assessment & Plan  Palliative care following  Ricardo Condon describes his dad typically avoid health care and wouldn't any of this current level of care current getting family to come in  and say goodbye vs trying to get back to [facility] and then plan towards focusing on his comfort   DNR/DNI  If he was to worsen Taurus would like to transitioning to comfort measures    Lactic acidosis  Assessment & Plan  Multifactorial possible sepsis +/- respiratory distress +/- any transient ischemia from reported inguinal hernia   Resolved  Monitor for recurrence         Palliative care has been consulted to discuss regarding goal of care. Patient is DNR/I ok    VTE:  Lovenox  Ulcer: Not Indicated  Lines: Esteves Catheter  Ongoing indication addressed    I have performed a physical exam and reviewed and updated ROS and Plan today (7/27/2020). In review of yesterday's note (7/26/2020), there are no changes except as documented above.     Discussed patient condition and risk of morbidity and/or mortality with RN, RT, Pharmacy, Charge nurse / hot rounds and Patient     The patient remains critically ill on/near max HFNC with active titration, monitoring for need to intubate or initiate AVAPS.  Critical care time = 45 minutes in directly providing and coordinating critical care and extensive data review.  No time overlap and excludes procedures.

## 2020-07-27 NOTE — PROGRESS NOTES
Son, Taruus, plans to drive in from [facility]. Brother will be flying in later tonight with plans to visit tomorrow.

## 2020-07-27 NOTE — CARE PLAN
Problem: Oxygenation:  Goal: Maintain adequate oxygenation dependent on patient condition  Outcome: PROGRESSING SLOWER THAN EXPECTED   60L 100%  Duo QID  7% QID

## 2020-07-27 NOTE — CARE PLAN
Problem: Oxygenation:  Goal: Maintain adequate oxygenation dependent on patient condition  Outcome: PROGRESSING AS EXPECTED   HHFNC 40L 80%  Duo QID  7% QID

## 2020-07-27 NOTE — DIETARY
Nutrition Services: Update   Pt with a standing TF order for Impact Peptide 1.5 @ 55 mL/hr however no Cortrak in place - per discussion w/ RN, Cortrak was pulled 7/26.  Per MD progress note from 7/25 Impact Peptide 1.5 reached goal rate of 55 mL/hr.  Palliative Care discussions ongoing and noted that pt likely would not want PEG placement in the future after discussion w/ family; also noted that pt's brother is flying to see pt before transition to CC.  RD continues to monitor GOC/POC.

## 2020-07-27 NOTE — CARE PLAN
Problem: Nutritional:  Goal: Nutrition support tolerated and meeting greater than 85% of estimated needs  Outcome: PROGRESSING AS EXPECTED   Impact Peptide 1.5 was briefly @ goal (55 mL/hr) 7/25.  Currently off.  POC/GOC pending.

## 2020-07-27 NOTE — ASSESSMENT & PLAN NOTE
Mild, improved again slightly, sodium 140  Running dry with severe hypoxemia  Hep-Lock IV fluids, starting to take p.o.  Continue Serial BMP

## 2020-07-28 ENCOUNTER — APPOINTMENT (OUTPATIENT)
Dept: RADIOLOGY | Facility: MEDICAL CENTER | Age: 70
DRG: 871 | End: 2020-07-28
Attending: INTERNAL MEDICINE
Payer: MEDICARE

## 2020-07-28 LAB
ANION GAP SERPL CALC-SCNC: 9 MMOL/L (ref 7–16)
BASOPHILS # BLD AUTO: 0.4 % (ref 0–1.8)
BASOPHILS # BLD: 0.06 K/UL (ref 0–0.12)
BUN SERPL-MCNC: 33 MG/DL (ref 8–22)
CALCIUM SERPL-MCNC: 10.1 MG/DL (ref 8.5–10.5)
CHLORIDE SERPL-SCNC: 113 MMOL/L (ref 96–112)
CO2 SERPL-SCNC: 25 MMOL/L (ref 20–33)
CREAT SERPL-MCNC: 1.01 MG/DL (ref 0.5–1.4)
EOSINOPHIL # BLD AUTO: 0.38 K/UL (ref 0–0.51)
EOSINOPHIL NFR BLD: 2.5 % (ref 0–6.9)
ERYTHROCYTE [DISTWIDTH] IN BLOOD BY AUTOMATED COUNT: 54.4 FL (ref 35.9–50)
GLUCOSE BLD-MCNC: 114 MG/DL (ref 65–99)
GLUCOSE SERPL-MCNC: 121 MG/DL (ref 65–99)
HCT VFR BLD AUTO: 44.6 % (ref 42–52)
HGB BLD-MCNC: 13.6 G/DL (ref 14–18)
IMM GRANULOCYTES # BLD AUTO: 0.16 K/UL (ref 0–0.11)
IMM GRANULOCYTES NFR BLD AUTO: 1 % (ref 0–0.9)
LYMPHOCYTES # BLD AUTO: 1.84 K/UL (ref 1–4.8)
LYMPHOCYTES NFR BLD: 12 % (ref 22–41)
MCH RBC QN AUTO: 29.4 PG (ref 27–33)
MCHC RBC AUTO-ENTMCNC: 30.5 G/DL (ref 33.7–35.3)
MCV RBC AUTO: 96.3 FL (ref 81.4–97.8)
MONOCYTES # BLD AUTO: 0.86 K/UL (ref 0–0.85)
MONOCYTES NFR BLD AUTO: 5.6 % (ref 0–13.4)
NEUTROPHILS # BLD AUTO: 12.06 K/UL (ref 1.82–7.42)
NEUTROPHILS NFR BLD: 78.5 % (ref 44–72)
NRBC # BLD AUTO: 0 K/UL
NRBC BLD-RTO: 0 /100 WBC
PLATELET # BLD AUTO: 233 K/UL (ref 164–446)
PMV BLD AUTO: 11.2 FL (ref 9–12.9)
POTASSIUM SERPL-SCNC: 4.2 MMOL/L (ref 3.6–5.5)
RBC # BLD AUTO: 4.63 M/UL (ref 4.7–6.1)
SODIUM SERPL-SCNC: 147 MMOL/L (ref 135–145)
WBC # BLD AUTO: 15.4 K/UL (ref 4.8–10.8)

## 2020-07-28 PROCEDURE — 85025 COMPLETE CBC W/AUTO DIFF WBC: CPT

## 2020-07-28 PROCEDURE — 94760 N-INVAS EAR/PLS OXIMETRY 1: CPT

## 2020-07-28 PROCEDURE — 700105 HCHG RX REV CODE 258: Performed by: INTERNAL MEDICINE

## 2020-07-28 PROCEDURE — 92610 EVALUATE SWALLOWING FUNCTION: CPT

## 2020-07-28 PROCEDURE — 700102 HCHG RX REV CODE 250 W/ 637 OVERRIDE(OP): Performed by: INTERNAL MEDICINE

## 2020-07-28 PROCEDURE — 99291 CRITICAL CARE FIRST HOUR: CPT | Performed by: INTERNAL MEDICINE

## 2020-07-28 PROCEDURE — 94640 AIRWAY INHALATION TREATMENT: CPT

## 2020-07-28 PROCEDURE — 97535 SELF CARE MNGMENT TRAINING: CPT

## 2020-07-28 PROCEDURE — 97530 THERAPEUTIC ACTIVITIES: CPT

## 2020-07-28 PROCEDURE — 700101 HCHG RX REV CODE 250: Performed by: INTERNAL MEDICINE

## 2020-07-28 PROCEDURE — 71045 X-RAY EXAM CHEST 1 VIEW: CPT

## 2020-07-28 PROCEDURE — 80048 BASIC METABOLIC PNL TOTAL CA: CPT

## 2020-07-28 PROCEDURE — 82962 GLUCOSE BLOOD TEST: CPT

## 2020-07-28 PROCEDURE — 770022 HCHG ROOM/CARE - ICU (200)

## 2020-07-28 PROCEDURE — A9270 NON-COVERED ITEM OR SERVICE: HCPCS | Performed by: INTERNAL MEDICINE

## 2020-07-28 PROCEDURE — 700111 HCHG RX REV CODE 636 W/ 250 OVERRIDE (IP): Performed by: INTERNAL MEDICINE

## 2020-07-28 RX ADMIN — DEXTROSE AND SODIUM CHLORIDE: 5; 450 INJECTION, SOLUTION INTRAVENOUS at 20:42

## 2020-07-28 RX ADMIN — DEXTROSE AND SODIUM CHLORIDE: 5; 450 INJECTION, SOLUTION INTRAVENOUS at 01:32

## 2020-07-28 RX ADMIN — IPRATROPIUM BROMIDE AND ALBUTEROL SULFATE 3 ML: .5; 3 SOLUTION RESPIRATORY (INHALATION) at 18:18

## 2020-07-28 RX ADMIN — IPRATROPIUM BROMIDE AND ALBUTEROL SULFATE 3 ML: .5; 3 SOLUTION RESPIRATORY (INHALATION) at 10:57

## 2020-07-28 RX ADMIN — GUAIFENESIN 200 MG: 100 SOLUTION ORAL at 14:31

## 2020-07-28 RX ADMIN — SODIUM CHLORIDE 4 ML: 7 NEBU SOLN,3 % NEBU at 10:57

## 2020-07-28 RX ADMIN — IPRATROPIUM BROMIDE AND ALBUTEROL SULFATE 3 ML: .5; 3 SOLUTION RESPIRATORY (INHALATION) at 06:56

## 2020-07-28 RX ADMIN — SODIUM CHLORIDE 4 ML: 7 NEBU SOLN,3 % NEBU at 06:56

## 2020-07-28 RX ADMIN — GUAIFENESIN 200 MG: 100 SOLUTION ORAL at 20:38

## 2020-07-28 RX ADMIN — ENOXAPARIN SODIUM 40 MG: 40 INJECTION SUBCUTANEOUS at 05:08

## 2020-07-28 RX ADMIN — SODIUM CHLORIDE 4 ML: 7 NEBU SOLN,3 % NEBU at 15:21

## 2020-07-28 RX ADMIN — DEXTROSE AND SODIUM CHLORIDE: 5; 450 INJECTION, SOLUTION INTRAVENOUS at 14:31

## 2020-07-28 RX ADMIN — IPRATROPIUM BROMIDE AND ALBUTEROL SULFATE 3 ML: .5; 3 SOLUTION RESPIRATORY (INHALATION) at 15:21

## 2020-07-28 RX ADMIN — SODIUM CHLORIDE 4 ML: 7 NEBU SOLN,3 % NEBU at 18:19

## 2020-07-28 ASSESSMENT — ENCOUNTER SYMPTOMS
ABDOMINAL PAIN: 0
CHILLS: 0
SHORTNESS OF BREATH: 1
PALPITATIONS: 0
SENSORY CHANGE: 0
DEPRESSION: 0
FLANK PAIN: 0
FOCAL WEAKNESS: 0
DIARRHEA: 0
VOMITING: 0
FEVER: 0
BACK PAIN: 0
DIAPHORESIS: 0
SORE THROAT: 0
NAUSEA: 1
COUGH: 0
BLOOD IN STOOL: 0
BLURRED VISION: 1
HEADACHES: 0
STRIDOR: 0
DOUBLE VISION: 0
SPUTUM PRODUCTION: 0
SPEECH CHANGE: 0
NERVOUS/ANXIOUS: 0
CLAUDICATION: 0

## 2020-07-28 ASSESSMENT — COGNITIVE AND FUNCTIONAL STATUS - GENERAL
TOILETING: A LOT
PERSONAL GROOMING: A LOT
SUGGESTED CMS G CODE MODIFIER DAILY ACTIVITY: CL
DAILY ACTIVITIY SCORE: 12
DRESSING REGULAR UPPER BODY CLOTHING: A LOT
EATING MEALS: A LOT
HELP NEEDED FOR BATHING: A LOT
DRESSING REGULAR LOWER BODY CLOTHING: A LOT

## 2020-07-28 ASSESSMENT — FIBROSIS 4 INDEX: FIB4 SCORE: 1.060606060606060606
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
## 2020-07-28 NOTE — DIETARY
Nutrition Services: Update   Cortrak was replaced and pulled again overnight.  SLP alex was recommended in IDT rounds as pt was refusing Cortrak replacement.  Pt has since been able to initiate a regular, level 4, level 2 diet w/ 1:1 supervision.   Will add Boost to meal trays in increase nutritional value.  RD to d/c standing TF order at this time and will continue to monitor for sufficient intake.   On another note, pt noted with unsure wt loss on nutrition admit screen (filled out 7/27).  Per chart review, pt weighed 89.4 kg August 2019 - pt with a ~4% wt loss over the past 11 months, which is not severe or significant.     Recommendations/Plan:  1. Boost Plus to meal trays.    2. Encourage hydration and intake of meals/supplements.  Continue to document % consumed under ADLs.   3. Continue to monitor weight.    RD follows

## 2020-07-28 NOTE — CARE PLAN
Problem: Nutritional:  Goal: Achieve adequate nutritional intake  Description: Patient will consume ~50% or > of meals/supplements  Outcome: NOT MET
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
## 2020-07-28 NOTE — THERAPY
"Occupational Therapy  Daily Treatment     Patient Name: Johann Godoy  Age:  69 y.o., Sex:  male  Medical Record #: [id number]  Today's Date: 7/28/2020     Precautions  Precautions: Fall Risk, Swallow Precautions ( See Comments)  Comments: HFNC    Assessment    Patient seen for OT treatment this afternoon. He continues to require physical assist with mobility, more assist needed at this time for standing. Patient presents with lateral R lean in sitting and standing, has difficulty correcting despite heavy cuing and facilitation. He tends to hold a hand back on the bed and expresses fear of falling. O2 levels much more stable at this time, he desatted to 85 at one point, with quick recovery, mostly staying above 90 throughout session. Will continue to follow.     Plan    Continue current treatment plan.    Discharge recommendations:  Recommend post-acute placement for additional occupational therapy services prior to discharge home.      Subjective    \"I'll have the pineapple juice.\"     Objective       07/28/20 1404   Cognition    Cognition / Consciousness X   Speech/ Communication Dysarthric;Hard of Hearing   Level of Consciousness Alert   Ability To Follow Commands 1 Step   New Learning Impaired   Attention Impaired   Comments Pleasant and cooperative, has difficulty following directives when anxious   Activities of Daily Living   Eating Minimal Assist   Lower Body Dressing Maximal Assist   Toileting Maximal Assist   Skilled Intervention Compensatory Strategies;Facilitation;Verbal Cuing   Functional Mobility   Sit to Stand Maximal Assist   Bed, Chair, Wheelchair Transfer Unable to Participate   Mobility sup><sit, STS   Skilled Intervention Compensatory Strategies;Facilitation;Postural Facilitation;Sequencing;Verbal Cuing   Comments O2 levels stable, RN present. Heavy R lean in standing, patient fearful    Short Term Goals   Short Term Goal # 1 Pt will complete ADL xfer supv   Goal Outcome # 1 Progressing slower than " expected   Short Term Goal # 2 Pt will complete seated G/H supv   Goal Outcome # 2 Progressing as expected   Short Term Goal # 3 Pt will complete LB dressing supv   Goal Outcome # 3 Progressing slower than expected

## 2020-07-28 NOTE — PROGRESS NOTES
Monitor summary:     Rhythm: SR Rate: 60s-80s  Ectopy: frequent PVCs, PACs  .18/.12/.40    12 hr chart check

## 2020-07-28 NOTE — CARE PLAN
Problem: Communication  Goal: The ability to communicate needs accurately and effectively will improve  Outcome: PROGRESSING AS EXPECTED     Problem: Safety  Goal: Will remain free from injury  Outcome: PROGRESSING AS EXPECTED     Problem: Infection  Goal: Will remain free from infection  Outcome: PROGRESSING AS EXPECTED     Problem: Bowel/Gastric:  Goal: Normal bowel function is maintained or improved  Outcome: PROGRESSING AS EXPECTED     Problem: Fluid Volume:  Goal: Will maintain balanced intake and output  Outcome: PROGRESSING AS EXPECTED     Problem: Urinary Elimination:  Goal: Ability to reestablish a normal urinary elimination pattern will improve  Outcome: PROGRESSING AS EXPECTED     Problem: Mobility  Goal: Risk for activity intolerance will decrease  Outcome: PROGRESSING AS EXPECTED     Problem: Respiratory:  Goal: Respiratory status will improve  Outcome: PROGRESSING SLOWER THAN EXPECTED     Problem: Skin Integrity  Goal: Risk for impaired skin integrity will decrease  Outcome: PROGRESSING SLOWER THAN EXPECTED

## 2020-07-28 NOTE — CARE PLAN
Problem: Urinary Elimination:  Goal: Ability to reestablish a normal urinary elimination pattern will improve  Flowsheets (Taken 7/28/2020 0000)  Urinary Elimination: Catheter (Document on LDA)  Note: Esteves in place. Q2 I/Os documented.     Problem: Mobility  Goal: Risk for activity intolerance will decrease  Note: Pt mobilized to EOB/stand with 2-person assist. Pt tolerated with NRB over HFNC.

## 2020-07-28 NOTE — CARE PLAN
Problem: Oxygenation:  Goal: Maintain adequate oxygenation dependent on patient condition  Outcome: PROGRESSING AS EXPECTED       Respiratory Update    Treatment modality: SVN/HHFNC  Frequency: QID/Q4    Pt tolerating current treatments well with no adverse reactions.

## 2020-07-29 LAB
ANION GAP SERPL CALC-SCNC: 10 MMOL/L (ref 7–16)
BASOPHILS # BLD AUTO: 0.3 % (ref 0–1.8)
BASOPHILS # BLD: 0.05 K/UL (ref 0–0.12)
BUN SERPL-MCNC: 23 MG/DL (ref 8–22)
CALCIUM SERPL-MCNC: 9.6 MG/DL (ref 8.5–10.5)
CHLORIDE SERPL-SCNC: 106 MMOL/L (ref 96–112)
CO2 SERPL-SCNC: 24 MMOL/L (ref 20–33)
CREAT SERPL-MCNC: 0.84 MG/DL (ref 0.5–1.4)
EOSINOPHIL # BLD AUTO: 0.48 K/UL (ref 0–0.51)
EOSINOPHIL NFR BLD: 2.9 % (ref 0–6.9)
ERYTHROCYTE [DISTWIDTH] IN BLOOD BY AUTOMATED COUNT: 51.4 FL (ref 35.9–50)
GLUCOSE SERPL-MCNC: 120 MG/DL (ref 65–99)
HCT VFR BLD AUTO: 41.3 % (ref 42–52)
HGB BLD-MCNC: 12.9 G/DL (ref 14–18)
IMM GRANULOCYTES # BLD AUTO: 0.14 K/UL (ref 0–0.11)
IMM GRANULOCYTES NFR BLD AUTO: 0.8 % (ref 0–0.9)
LYMPHOCYTES # BLD AUTO: 1.9 K/UL (ref 1–4.8)
LYMPHOCYTES NFR BLD: 11.3 % (ref 22–41)
MCH RBC QN AUTO: 29.5 PG (ref 27–33)
MCHC RBC AUTO-ENTMCNC: 31.2 G/DL (ref 33.7–35.3)
MCV RBC AUTO: 94.3 FL (ref 81.4–97.8)
MONOCYTES # BLD AUTO: 1.03 K/UL (ref 0–0.85)
MONOCYTES NFR BLD AUTO: 6.1 % (ref 0–13.4)
NEUTROPHILS # BLD AUTO: 13.22 K/UL (ref 1.82–7.42)
NEUTROPHILS NFR BLD: 78.6 % (ref 44–72)
NRBC # BLD AUTO: 0 K/UL
NRBC BLD-RTO: 0 /100 WBC
PLATELET # BLD AUTO: 223 K/UL (ref 164–446)
PMV BLD AUTO: 11.2 FL (ref 9–12.9)
POTASSIUM SERPL-SCNC: 3.9 MMOL/L (ref 3.6–5.5)
RBC # BLD AUTO: 4.38 M/UL (ref 4.7–6.1)
SODIUM SERPL-SCNC: 140 MMOL/L (ref 135–145)
WBC # BLD AUTO: 16.8 K/UL (ref 4.8–10.8)

## 2020-07-29 PROCEDURE — 99233 SBSQ HOSP IP/OBS HIGH 50: CPT | Performed by: INTERNAL MEDICINE

## 2020-07-29 PROCEDURE — 770020 HCHG ROOM/CARE - TELE (206)

## 2020-07-29 PROCEDURE — 700111 HCHG RX REV CODE 636 W/ 250 OVERRIDE (IP): Performed by: INTERNAL MEDICINE

## 2020-07-29 PROCEDURE — 94760 N-INVAS EAR/PLS OXIMETRY 1: CPT

## 2020-07-29 PROCEDURE — 700102 HCHG RX REV CODE 250 W/ 637 OVERRIDE(OP): Performed by: INTERNAL MEDICINE

## 2020-07-29 PROCEDURE — 94640 AIRWAY INHALATION TREATMENT: CPT

## 2020-07-29 PROCEDURE — A9270 NON-COVERED ITEM OR SERVICE: HCPCS | Performed by: INTERNAL MEDICINE

## 2020-07-29 PROCEDURE — 92526 ORAL FUNCTION THERAPY: CPT

## 2020-07-29 PROCEDURE — 700101 HCHG RX REV CODE 250: Performed by: INTERNAL MEDICINE

## 2020-07-29 PROCEDURE — 85025 COMPLETE CBC W/AUTO DIFF WBC: CPT

## 2020-07-29 PROCEDURE — 80048 BASIC METABOLIC PNL TOTAL CA: CPT

## 2020-07-29 RX ORDER — POTASSIUM CHLORIDE 20 MEQ/1
20 TABLET, EXTENDED RELEASE ORAL ONCE
Status: COMPLETED | OUTPATIENT
Start: 2020-07-29 | End: 2020-07-29

## 2020-07-29 RX ADMIN — IPRATROPIUM BROMIDE AND ALBUTEROL SULFATE 3 ML: .5; 3 SOLUTION RESPIRATORY (INHALATION) at 23:25

## 2020-07-29 RX ADMIN — IPRATROPIUM BROMIDE AND ALBUTEROL SULFATE 3 ML: .5; 3 SOLUTION RESPIRATORY (INHALATION) at 10:26

## 2020-07-29 RX ADMIN — IPRATROPIUM BROMIDE AND ALBUTEROL SULFATE 3 ML: .5; 3 SOLUTION RESPIRATORY (INHALATION) at 06:46

## 2020-07-29 RX ADMIN — GUAIFENESIN 200 MG: 100 SOLUTION ORAL at 16:52

## 2020-07-29 RX ADMIN — IPRATROPIUM BROMIDE AND ALBUTEROL SULFATE 3 ML: .5; 3 SOLUTION RESPIRATORY (INHALATION) at 14:59

## 2020-07-29 RX ADMIN — SODIUM CHLORIDE 4 ML: 7 NEBU SOLN,3 % NEBU at 06:46

## 2020-07-29 RX ADMIN — POTASSIUM CHLORIDE 20 MEQ: 1500 TABLET, EXTENDED RELEASE ORAL at 10:30

## 2020-07-29 RX ADMIN — SODIUM CHLORIDE 4 ML: 7 NEBU SOLN,3 % NEBU at 14:59

## 2020-07-29 RX ADMIN — SODIUM CHLORIDE 4 ML: 7 NEBU SOLN,3 % NEBU at 10:26

## 2020-07-29 RX ADMIN — GUAIFENESIN 200 MG: 100 SOLUTION ORAL at 02:34

## 2020-07-29 RX ADMIN — GUAIFENESIN 200 MG: 100 SOLUTION ORAL at 10:30

## 2020-07-29 RX ADMIN — SCOLOPAMINE TRANSDERMAL SYSTEM 1 PATCH: 1 PATCH, EXTENDED RELEASE TRANSDERMAL at 16:51

## 2020-07-29 RX ADMIN — ENOXAPARIN SODIUM 40 MG: 40 INJECTION SUBCUTANEOUS at 05:11

## 2020-07-29 ASSESSMENT — ENCOUNTER SYMPTOMS
STRIDOR: 0
VOMITING: 0
SPEECH CHANGE: 0
CLAUDICATION: 0
FLANK PAIN: 0
BLURRED VISION: 1
DEPRESSION: 0
NAUSEA: 0
SPUTUM PRODUCTION: 0
COUGH: 0
ABDOMINAL PAIN: 0
BACK PAIN: 0
FOCAL WEAKNESS: 0
SHORTNESS OF BREATH: 1
CHILLS: 0
SENSORY CHANGE: 0
PALPITATIONS: 0
DOUBLE VISION: 0
SORE THROAT: 0
DIARRHEA: 0
BLOOD IN STOOL: 0
HEADACHES: 0
DIAPHORESIS: 0
NERVOUS/ANXIOUS: 0
FEVER: 0

## 2020-07-29 ASSESSMENT — FIBROSIS 4 INDEX: FIB4 SCORE: 1.1

## 2020-07-29 NOTE — CARE PLAN
Problem: Bowel/Gastric:  Goal: Normal bowel function is maintained or improved  Flowsheets (Taken 7/29/2020 0000)  Last BM: 07/28/20  Note: Multiple large stools during the day. Stool softeners held.     Problem: Urinary Elimination:  Goal: Ability to reestablish a normal urinary elimination pattern will improve  Note: Esteves catheter in place. Pt has adequate output.

## 2020-07-29 NOTE — PROGRESS NOTES
Triage office note    ICU transfer    Discussed with Dr hendricks    ICU attending     Reason for admission/transfer respiratory failure, aspiration pneumonia, still on high flow NC but improving    Reason to consult transfer to floor    Receiving physician: Dr Mann.
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
## 2020-07-29 NOTE — CARE PLAN
Problem: Oxygenation:  Goal: Maintain adequate oxygenation dependent on patient condition  Outcome: PROGRESSING SLOWER THAN EXPECTED     Problem: Hyperinflation:  Goal: Prevent or improve atelectasis  Outcome: PROGRESSING SLOWER THAN EXPECTED     Problem: Bronchopulmonary Hygiene:  Goal: Increase mobilization of retained secretions  Outcome: PROGRESSING SLOWER THAN EXPECTED

## 2020-07-29 NOTE — THERAPY
Speech Language Pathology  Daily Treatment     Patient Name: Johann Godoy  Age:  69 y.o., Sex:  male  Medical Record #: [id number]  Today's Date: 7/29/2020     Precautions  Precautions: (P) Fall Risk, Swallow Precautions ( See Comments)  Comments: (P) HFNC    Assessment    Patient is a 69 year old admitted with sepsis, pna, with 7/28 chest xray revealing stable pulmonary edema and/or infiltrates.  Patient seen with breakfast tray, AAO x 3, with word-finding deficit noted in conversational speech.  Assisted pt with tray set up and pt is able to self feed with assist, taking appropriately sized bites and sips of pureed solids and mildly thickened liquids.  Pt consumed 10% of pureed entrees (2), 100% puree fruit, 100% Boost and juice.  Pt prefers liquid options to pureed entrees.  Discussed with RN and will modify diet to assist in amount taken as pt prefers drinks to solids.  Cough noted once at end of meal, otherwise no overt s/s of aspiration during meal.    Plan    Continue current treatment plan.   Diet changed to LQ3/MT2 with ongoing assistance required at meals.    Discharge recommendations:  Recommend inpatient transitional care services for continued speech therapy services.         Objective       07/29/20 0901   Vitals   O2 (LPM) 45   O2 Delivery Device Heated High Flow Nasal Cannula   Vitals Comments Maintained O2 sat levels during meal   Voice   Comments clear   Dysphagia    Dysphagia X   Diet / Liquid Recommendation Mildly Thick (2) - (Nectar Thick);Liquidised (3) - (Nectar Thick Full Liquid)   Nutritional Liquid Intake Rating Scale Thickened beverages (mildly thick unless otherwise specified)   Nutritional Food Intake Rating Scale Total oral diet of a single consistency   Nursing Communication Swallow Precaution Sign Posted at Head of Bed   Skilled Intervention Verbal Cueing;Compensatory Strategies   Recommended Route of Medication Administration   Medication Administration  Crush all Medications in Puree    Short Term Goals   Short Term Goal # 1 Revised 7/29: Pt will consume a LQ3/MT2 (NTFL) diet without s/s of aspiration, with 1:1 assistance.   Goal Outcome # 1 Progressing as expected   Interdisciplinary Plan of Care Collaboration   IDT Collaboration with  Nursing   Collaboration Comments Diet changed due to refusal to eat pureed entrees   Session Information   Priority 3  (3x. Sepsis/PNA, HFNC O2; LQ3/MT2 w/ assist;1 cough post meal)

## 2020-07-29 NOTE — PALLIATIVE CARE
Palliative Care follow-up  PC RN sent request for Advance Directive records to [facility] per pt's son, Taurus's, request. Await fax back.       Thank you for allowing Palliative Care to support this patient and family. Contact [phone] for additional assistance, change in patient status, or with any questions/concerns.

## 2020-07-29 NOTE — PALLIATIVE CARE
Palliative Care follow-up  PC RN met with pt's son Taurus, brother in law Cage, and Dr. Merida at bedside. MD provides update that pt's clinical picture is improving including oxygenation and mental status. Pt passed SLP eval today. Plan to continue treatment and work towards strengthening, mobility, and weaning O2 rather than comfort approach. Should patient worsen, all family and pt agree that comfort approach aligns with pt's wishes. After MD steps out, PC RN completes POLST with patient and son. Pt chose DNR, selective treatment, and no artificial nutrition. All questions answered.       Updated: CHAZ Leiva     Plan: continue treatment and mobility. DNR/DNI. Re-address goals if pt's status should worsen.     Thank you for allowing Palliative Care to support this patient and family. Contact [phone] for additional assistance, change in patient status, or with any questions/concerns.

## 2020-07-30 PROBLEM — E87.20 LACTIC ACIDOSIS: Status: RESOLVED | Noted: 2020-07-17 | Resolved: 2020-07-30

## 2020-07-30 PROBLEM — D72.829 LEUKOCYTOSIS: Status: RESOLVED | Noted: 2020-07-17 | Resolved: 2020-07-30

## 2020-07-30 LAB
ANION GAP SERPL CALC-SCNC: 13 MMOL/L (ref 7–16)
BASOPHILS # BLD AUTO: 0.5 % (ref 0–1.8)
BASOPHILS # BLD: 0.07 K/UL (ref 0–0.12)
BUN SERPL-MCNC: 19 MG/DL (ref 8–22)
CALCIUM SERPL-MCNC: 9.9 MG/DL (ref 8.5–10.5)
CHLORIDE SERPL-SCNC: 102 MMOL/L (ref 96–112)
CO2 SERPL-SCNC: 24 MMOL/L (ref 20–33)
CREAT SERPL-MCNC: 0.9 MG/DL (ref 0.5–1.4)
EOSINOPHIL # BLD AUTO: 0.4 K/UL (ref 0–0.51)
EOSINOPHIL NFR BLD: 2.8 % (ref 0–6.9)
ERYTHROCYTE [DISTWIDTH] IN BLOOD BY AUTOMATED COUNT: 49.1 FL (ref 35.9–50)
GLUCOSE SERPL-MCNC: 98 MG/DL (ref 65–99)
HCT VFR BLD AUTO: 43.2 % (ref 42–52)
HGB BLD-MCNC: 13.7 G/DL (ref 14–18)
IMM GRANULOCYTES # BLD AUTO: 0.11 K/UL (ref 0–0.11)
IMM GRANULOCYTES NFR BLD AUTO: 0.8 % (ref 0–0.9)
LYMPHOCYTES # BLD AUTO: 2.02 K/UL (ref 1–4.8)
LYMPHOCYTES NFR BLD: 13.9 % (ref 22–41)
MCH RBC QN AUTO: 29.5 PG (ref 27–33)
MCHC RBC AUTO-ENTMCNC: 31.7 G/DL (ref 33.7–35.3)
MCV RBC AUTO: 93.1 FL (ref 81.4–97.8)
MONOCYTES # BLD AUTO: 1.16 K/UL (ref 0–0.85)
MONOCYTES NFR BLD AUTO: 8 % (ref 0–13.4)
NEUTROPHILS # BLD AUTO: 10.75 K/UL (ref 1.82–7.42)
NEUTROPHILS NFR BLD: 74 % (ref 44–72)
NRBC # BLD AUTO: 0 K/UL
NRBC BLD-RTO: 0 /100 WBC
PLATELET # BLD AUTO: 237 K/UL (ref 164–446)
PMV BLD AUTO: 11.1 FL (ref 9–12.9)
POTASSIUM SERPL-SCNC: 3.9 MMOL/L (ref 3.6–5.5)
RBC # BLD AUTO: 4.64 M/UL (ref 4.7–6.1)
SODIUM SERPL-SCNC: 139 MMOL/L (ref 135–145)
WBC # BLD AUTO: 14.5 K/UL (ref 4.8–10.8)

## 2020-07-30 PROCEDURE — 80048 BASIC METABOLIC PNL TOTAL CA: CPT

## 2020-07-30 PROCEDURE — 97530 THERAPEUTIC ACTIVITIES: CPT

## 2020-07-30 PROCEDURE — 700102 HCHG RX REV CODE 250 W/ 637 OVERRIDE(OP): Performed by: INTERNAL MEDICINE

## 2020-07-30 PROCEDURE — 770020 HCHG ROOM/CARE - TELE (206)

## 2020-07-30 PROCEDURE — 36415 COLL VENOUS BLD VENIPUNCTURE: CPT

## 2020-07-30 PROCEDURE — 94640 AIRWAY INHALATION TREATMENT: CPT

## 2020-07-30 PROCEDURE — A9270 NON-COVERED ITEM OR SERVICE: HCPCS | Performed by: INTERNAL MEDICINE

## 2020-07-30 PROCEDURE — 99232 SBSQ HOSP IP/OBS MODERATE 35: CPT | Performed by: HOSPITALIST

## 2020-07-30 PROCEDURE — 85025 COMPLETE CBC W/AUTO DIFF WBC: CPT

## 2020-07-30 PROCEDURE — 700111 HCHG RX REV CODE 636 W/ 250 OVERRIDE (IP): Performed by: INTERNAL MEDICINE

## 2020-07-30 PROCEDURE — 94760 N-INVAS EAR/PLS OXIMETRY 1: CPT

## 2020-07-30 PROCEDURE — 700101 HCHG RX REV CODE 250: Performed by: INTERNAL MEDICINE

## 2020-07-30 RX ADMIN — ENOXAPARIN SODIUM 40 MG: 40 INJECTION SUBCUTANEOUS at 05:09

## 2020-07-30 RX ADMIN — IPRATROPIUM BROMIDE AND ALBUTEROL SULFATE 3 ML: .5; 3 SOLUTION RESPIRATORY (INHALATION) at 07:28

## 2020-07-30 RX ADMIN — IPRATROPIUM BROMIDE AND ALBUTEROL SULFATE 3 ML: .5; 3 SOLUTION RESPIRATORY (INHALATION) at 20:04

## 2020-07-30 RX ADMIN — GUAIFENESIN 200 MG: 100 SOLUTION ORAL at 05:09

## 2020-07-30 RX ADMIN — IPRATROPIUM BROMIDE AND ALBUTEROL SULFATE 3 ML: .5; 3 SOLUTION RESPIRATORY (INHALATION) at 16:57

## 2020-07-30 RX ADMIN — SODIUM CHLORIDE 4 ML: 7 NEBU SOLN,3 % NEBU at 10:26

## 2020-07-30 RX ADMIN — IPRATROPIUM BROMIDE AND ALBUTEROL SULFATE 3 ML: .5; 3 SOLUTION RESPIRATORY (INHALATION) at 10:26

## 2020-07-30 RX ADMIN — GUAIFENESIN 200 MG: 100 SOLUTION ORAL at 09:07

## 2020-07-30 RX ADMIN — GUAIFENESIN 200 MG: 100 SOLUTION ORAL at 15:10

## 2020-07-30 RX ADMIN — GUAIFENESIN 200 MG: 100 SOLUTION ORAL at 19:46

## 2020-07-30 RX ADMIN — SODIUM CHLORIDE 4 ML: 7 NEBU SOLN,3 % NEBU at 07:28

## 2020-07-30 ASSESSMENT — ENCOUNTER SYMPTOMS
NAUSEA: 0
LOSS OF CONSCIOUSNESS: 0
WEAKNESS: 1
BACK PAIN: 0
PALPITATIONS: 0
SHORTNESS OF BREATH: 1
CHILLS: 0
HEADACHES: 0
SORE THROAT: 0
VOMITING: 0
FEVER: 0
DOUBLE VISION: 0
DIZZINESS: 0
ABDOMINAL PAIN: 0
BLURRED VISION: 0
DIARRHEA: 0
COUGH: 0

## 2020-07-30 ASSESSMENT — COGNITIVE AND FUNCTIONAL STATUS - GENERAL
MOBILITY SCORE: 6
SUGGESTED CMS G CODE MODIFIER MOBILITY: CN
WALKING IN HOSPITAL ROOM: TOTAL
CLIMB 3 TO 5 STEPS WITH RAILING: TOTAL
STANDING UP FROM CHAIR USING ARMS: TOTAL
MOVING FROM LYING ON BACK TO SITTING ON SIDE OF FLAT BED: UNABLE
TURNING FROM BACK TO SIDE WHILE IN FLAT BAD: UNABLE
MOVING TO AND FROM BED TO CHAIR: UNABLE

## 2020-07-30 ASSESSMENT — GAIT ASSESSMENTS: GAIT LEVEL OF ASSIST: UNABLE TO PARTICIPATE

## 2020-07-30 NOTE — ASSESSMENT & PLAN NOTE
Extubated 7/22  Oxygen requirement is trending down currently he is on 40 L of oxygen and maintaining saturation.  Requested RN to titrate down oxygen.  He underwent ultrasound DVT bilateral lower extremity did not show any deep venous thrombosis.  Secretions have improved last 48hrs per RT  Cont O2/RT protocols  Pulmonology is following  Follow volume status closely: no improvement with diuresis over last 48hrs  Mobilize  I encourage him to use incentive spirometry.  I discussed case with pulmonology today.  Continue diuresis.

## 2020-07-30 NOTE — CONSULTS
Hospital Medicine Consultation    Date of Service  7/30/2020    Referring Physician  Dixon Eduardo, *    Consulting Physician  Dixon Eduardo D.O.    Reason for Consultation  Assistance with medical management    History of Presenting Illness  69 y.o. male who presented 7/17/2020 with acute hypoxic respiratory failure.  He has a previous medical history that includes congestive heart failure, right eye enucleation following trauma, previous stroke, history of MRSA infection, diabetes, COPD, hypertension, coronary artery disease, DVT, hypothyroidism.  He originally presented to [facility], [place], operated by [facility] in [facility] and was admitted for possible aspiration pneumonitis.  He was transferred to us after approximately week at that facility due to worsening condition.  On arrival here the patient was on 15 L nonrebreather and satting in the low 90s.  His COVID was negative, subsequent cultures from the sputum grew out gram-positive cocci.  He was treated with 7 days of Zosyn which was completed on July 24.  He required intubation and bronchoscopy on July 21.  During that procedure he was found to have large amount of purulent secretions in the right mainstem and right lower lobe.  The patient went on to self extubate himself on July 22.  At that point he was made DNR however intubation okay after discussion with family.  Patient was transferred out of the ICU on July 29.  His ICU stay was also complicated by small bowel obstruction.  He was treated nonoperatively.        Review of Systems  Review of Systems   Constitutional: Negative for chills and fever.   HENT: Negative for nosebleeds and sore throat.    Eyes: Negative for blurred vision and double vision.   Respiratory: Positive for shortness of breath. Negative for cough.    Cardiovascular: Negative for chest pain, palpitations and leg swelling.   Gastrointestinal: Negative for abdominal pain, diarrhea, nausea and vomiting.   Genitourinary:  Negative for dysuria and urgency.   Musculoskeletal: Negative for back pain.   Skin: Negative for rash.   Neurological: Positive for weakness. Negative for dizziness, loss of consciousness and headaches.       Past Medical History   has a past medical history of KORIN (acute kidney injury) ([facility]) (7/17/2020), Aspiration pneumonia ([facility]) (7/27/2020), and ETOH abuse. He also has no past medical history of Anesthesia, Angina, Arrhythmia, Arthritis, ASTHMA, Backpain, Bronchitis, Cancer ([facility]), CATARACT, Congestive heart failure ([facility]), COPD, Diabetes, Dialysis, Glaucoma, Heart murmur, Heart valve disease, Hypertension, Indigestion, Infectious disease, Jaundice, Myocardial infarct ([facility]), Other specified symptom associated with female genital organs, Pacemaker, Personal history of venous thrombosis and embolism, Psychiatric problem, Rheumatic fever, Seizure ([facility]), Stroke ([facility]), Unspecified disorder of thyroid, Unspecified hemorrhagic conditions, or Unspecified urinary incontinence.    Surgical History   has a past surgical history that includes orbital fracture orif (7/27/2009); nasal fracture reduction open (7/27/2009); zygomatic arch orif (7/27/2009); maxilla fracture orif (7/27/2009); and nerve repair (7/27/2009).    Family History  family history is not on file.    Social History   reports that he has been smoking cigarettes. He has a 41.00 pack-year smoking history. He does not have any smokeless tobacco history on file. He reports current alcohol use. He reports that he does not use drugs.    Medications  Prior to Admission Medications   Prescriptions Last Dose Informant Patient Reported? Taking?   ARTIFICIAL TEARS 1.4 % SOLN   Yes No   Sig: Place 1 Drop in both eyes every four hours as needed.   DULCOLAX PO   Yes No   Sig: Take 10 mg by mouth every 24 hours as needed. Take 10 mg by mouth one time daily as needed for constipation    LISINOPRIL 20 MG TABS   Yes No   Sig: Take 20 mg by mouth every day. Take 20 mg by mouth  one time daily       Facility-Administered Medications: None       Allergies  No Known Allergies    Physical Exam  Temp:  [36.1 °C (97 °F)-36.6 °C (97.9 °F)] 36.1 °C (97 °F)  Pulse:  [68-88] 79  Resp:  [14-35] 20  BP: (107-123)/(51-75) 118/61  SpO2:  [87 %-97 %] 90 %    Physical Exam  Vitals signs reviewed.   Constitutional:       General: He is not in acute distress.     Appearance: He is well-developed. He is not diaphoretic.   HENT:      Head: Normocephalic and atraumatic.   Eyes:      Conjunctiva/sclera: Conjunctivae normal.   Neck:      Vascular: No JVD.   Cardiovascular:      Rate and Rhythm: Normal rate.      Heart sounds: Murmur present. No gallop.    Pulmonary:      Effort: Pulmonary effort is normal. No respiratory distress.      Breath sounds: No stridor. Rales present. No wheezing.   Abdominal:      Palpations: Abdomen is soft.      Tenderness: There is no abdominal tenderness. There is no guarding or rebound.   Musculoskeletal:         General: No tenderness.      Right lower leg: No edema.      Left lower leg: No edema.   Skin:     General: Skin is warm and dry.      Findings: No rash.   Neurological:      Mental Status: He is alert and oriented to person, place, and time. Mental status is at baseline.   Psychiatric:         Mood and Affect: Mood normal.         Thought Content: Thought content normal.         Fluids      Laboratory  Recent Labs     07/28/20  0511 07/29/20  0509 07/30/20  0443   WBC 15.4* 16.8* 14.5*   RBC 4.63* 4.38* 4.64*   HEMOGLOBIN 13.6* 12.9* 13.7*   HEMATOCRIT 44.6 41.3* 43.2   MCV 96.3 94.3 93.1   MCH 29.4 29.5 29.5   MCHC 30.5* 31.2* 31.7*   RDW 54.4* 51.4* 49.1   PLATELETCT 233 223 237   MPV 11.2 11.2 11.1     Recent Labs     07/28/20  0511 07/29/20  0509 07/30/20  0443   SODIUM 147* 140 139   POTASSIUM 4.2 3.9 3.9   CHLORIDE 113* 106 102   CO2 25 24 24   GLUCOSE 121* 120* 98   BUN 33* 23* 19   CREATININE 1.01 0.84 0.90   CALCIUM 10.1 9.6 9.9                      Imaging  DX-CHEST-PORTABLE (1 VIEW)   Final Result         1.  Pulmonary edema and/or infiltrates are identified, which are stable since the prior exam. Trace right pleural effusion         EC-ECHOCARDIOGRAM COMPLETE W/ CONT   Final Result      FN-RELSNRN-7 VIEW   Final Result      1.  Mildly prominent small bowel in LEFT upper quadrant, of uncertain significance.  No definite bowel obstruction.   2.  Increased colonic stool suggesting constipation.   3.  Supportive tubing as described above.      DX-CHEST-PORTABLE (1 VIEW)   Final Result         1.  Pulmonary edema and/or infiltrates are identified, which are stable since the prior exam.   2.  Right midlung pulmonary nodule, visible on prior CT yesterday, see CT for further characterization and recommendations.      CT-CTA CHEST PULMONARY ARTERY W/ RECONS   Final Result      1.  No CT evidence for pulmonary emboli.   2.  RIGHT lower lobe atelectasis with bronchial occlusion.   3.  Small LEFT pleural effusion with associated atelectasis.   4.  Numerous ill-defined nodular densities in both lungs which may be inflammatory or neoplastic.  Septic emboli is a consideration.   5.  Supportive tubing as described above.               DX-CHEST-PORTABLE (1 VIEW)   Final Result         1.  Pulmonary edema and/or infiltrates are identified, which are stable since the prior exam.      DX-ABDOMEN FOR TUBE PLACEMENT   Final Result         Feeding tube with tip projecting over the expected area of the stomach fundus.      DX-CHEST-PORTABLE (1 VIEW)   Final Result         1.  Pulmonary edema and/or infiltrates are identified, which are stable since the prior exam.   2.  Small left pleural effusion      DX-CHEST-PORTABLE (1 VIEW)   Final Result         1.  Pulmonary edema and/or infiltrates are identified, which are stable since the prior exam.      DX-CHEST-PORTABLE (1 VIEW)   Final Result      1.  Satisfactory appearance of the ET tube and right IJ catheter.   2.  There is no  visible pneumothorax.   3.  Stable perihilar and lower lobe opacities likely due to atelectasis and/or edema.      DX-CHEST-LIMITED (1 VIEW)   Final Result         No significant interval change.      IN-IEREHMK-6 VIEW   Final Result      NG tube tip overlies the gastric body.      OUTSIDE IMAGES-CT ABDOMEN /PELVIS   Final Result      OUTSIDE IMAGES-DX CHEST   Final Result      OUTSIDE IMAGES-DX ABDOMEN   Final Result      OUTSIDE IMAGES-CT THORACIC SPINE   Final Result      DX-CHEST-LIMITED (1 VIEW)   Final Result         1.  Pulmonary edema and/or infiltrates.          Assessment/Plan  * Acute respiratory failure with hypoxia (HCC)  Assessment & Plan  Extubated 7/22  Pt is still on HFNC  Cont O2/RT protocols  Pulmonology is following  Follow volume status closely  Mobilize  IS      Aspiration pneumonia (HCC)  Assessment & Plan  Has completed 7 days of zosyn  Cont to watch for signs of aspiration    KORIN (acute kidney injury) (HCC)  Assessment & Plan  Resolved  Cont to follow daily BMP    SBO (small bowel obstruction) (HCC)  Assessment & Plan  Treated conservatively and resolved  At present is tolerating po  abd exam is benign  Cont bowel protocol    Hypernatremia  Assessment & Plan  Resolved  Cont to follow volume status and daily BMP    Goals of care, counseling/discussion  Assessment & Plan  At present pt is DNR/I

## 2020-07-30 NOTE — CARE PLAN
Problem: Nutritional:  Goal: Achieve adequate nutritional intake  Description: Patient will consume ~50% or > of meals/supplements  Outcome: NOT MET     PO <25% breakfast today (+% Boost.), no POs  documented yesterday. RD will continue to monitor.

## 2020-07-30 NOTE — PROGRESS NOTES
Assumed care of patient and bedside report received from previous RN. Patient educated on importance of calling, bed controls on, bed locked and in lowest position, call light with patient. Appropriate fall precautions in place including bed alarm and three bed rails up. Belongings and bedside table within reach. Patient readjusted and boosted, aaox2, RT at bedside.

## 2020-07-30 NOTE — CARE PLAN
Problem: Safety - Medical Restraint  Goal: Free from restraint(s) (Restraint for Interference with Medical Device)  Description: INTERVENTIONS:  1. ONCE/SHIFT or MINIMUM Q12H: Assess and document the continuing need for restraints  2. Q24H: Continued use of restraint requires LIP to perform face to face examination and written order  3. Identify and implement measures to help patient regain control  Outcome: PROGRESSING AS EXPECTED  Note: Patient not requiring restraints at this time     Problem: Psychosocial Needs:  Goal: Level of anxiety will decrease  Outcome: PROGRESSING AS EXPECTED  Intervention: Identify and develop with patient strategies to cope with anxiety triggers  Note: Patient not anxious at this time

## 2020-07-30 NOTE — ASSESSMENT & PLAN NOTE
Treated conservatively and resolved  Currently he is tolerating p.o. diet.  Abdominal exam did not show any signs of acute abdomen.  Continue bowel protocol.

## 2020-07-30 NOTE — CARE PLAN
Problem: Safety  Goal: Will remain free from injury  Outcome: PROGRESSING AS EXPECTED  Goal: Will remain free from falls  Outcome: PROGRESSING AS EXPECTED     Problem: Venous Thromboembolism (VTW)/Deep Vein Thrombosis (DVT) Prevention:  Goal: Patient will participate in Venous Thrombosis (VTE)/Deep Vein Thrombosis (DVT)Prevention Measures  Outcome: PROGRESSING AS EXPECTED     Problem: Urinary Elimination:  Goal: Ability to reestablish a normal urinary elimination pattern will improve  Outcome: PROGRESSING AS EXPECTED  Note: Esteves as ordered      Problem: Skin Integrity  Goal: Risk for impaired skin integrity will decrease  Outcome: PROGRESSING AS EXPECTED     Problem: Knowledge Deficit  Goal: Knowledge of disease process/condition, treatment plan, diagnostic tests, and medications will improve  Outcome: PROGRESSING SLOWER THAN EXPECTED  Note: Pt remains confused, son involved in care/decision making  Goal: Knowledge of the prescribed therapeutic regimen will improve  Outcome: PROGRESSING SLOWER THAN EXPECTED     Problem: Respiratory:  Goal: Respiratory status will improve  Outcome: PROGRESSING SLOWER THAN EXPECTED  Note: Remains on HiFlo NC, unable to titrate during shift      Problem: Mobility  Goal: Risk for activity intolerance will decrease  Outcome: PROGRESSING SLOWER THAN EXPECTED  Note: Per chart, able to stand with max assist today      Problem: Infection  Goal: Will remain free from infection  Outcome: MET     Problem: Bowel/Gastric:  Goal: Normal bowel function is maintained or improved  Outcome: MET  Goal: Will not experience complications related to bowel motility  Outcome: MET     Problem: Fluid Volume:  Goal: Will maintain balanced intake and output  Outcome: MET     Problem: Pain Management  Goal: Pain level will decrease to patient's comfort goal  Outcome: MET     Problem: Discharge Barriers/Planning  Goal: Patient's continuum of care needs will be met  Note: Pending palliative consult, remains on  HiFlo NC

## 2020-07-30 NOTE — THERAPY
Physical Therapy   Daily Treatment     Patient Name: Johann Godoy  Age:  69 y.o., Sex:  male  Medical Record #: [id number]  Today's Date: 7/30/2020     Precautions:  Fall Risk, Swallow Precautions ( See Comments)    Assessment    Patient able to stand with retropulsion and max A for 2 sets and about 45 seconds standing.  Patient maintained spO2 at 95% in standing, and was limited by muscular endurance, as patient notes leg fatigues in standing.  Patient able to follow cues, however confuses easily and is able to re-set with 4-5 deep breaths.  Unsure if this is anxiety based, or cognition based.  Patient requires assist for bed mobility, as patient able to return sit to supine, however needs assist for centering in bed.  Overall, patient willing participant, and appears to be progressing with decreasing assist, however would benefit from more frequent EOB activities with nursing staff.         07/30/20 1305   Precautions   Precautions Fall Risk;Swallow Precautions ( See Comments)   Comments HFNC   Cognition    Cognition / Consciousness X   Speech/ Communication Dysarthric;Hard of Hearing   Level of Consciousness Confused   Ability To Follow Commands 1 Step   New Learning Impaired   Attention Impaired   Comments inconsistent following, anxiety may be factor   Sitting Lower Body Exercises   Sitting Lower Body Exercises Yes   Ankle Pumps 1 set of 10;Bilateral   Long Arc Quad 1 set of 10;Bilateral   Other Exercises seated reaching crossbody   Standing Lower Body Exercises   Standing Lower Body Exercises Yes   Other Exercises static standing balance 2 x 40 seconds   Balance   Sitting Balance (Static) Fair   Sitting Balance (Dynamic) Fair -   Standing Balance (Static) Fair -   Standing Balance (Dynamic) Poor +   Weight Shift Sitting Poor   Weight Shift Standing Poor   Skilled Intervention Compensatory Strategies;Facilitation   Comments retropulses with standing, leans against bed secondary to poor anterior weight shift    Gait Analysis   Gait Level Of Assist Unable to Participate   Bed Mobility    Supine to Sit Moderate Assist   Sit to Supine Moderate Assist   Scooting Minimal Assist   Functional Mobility   Sit to Stand Maximal Assist   Skilled Intervention Compensatory Strategies   Comments 02 levels stable   How much difficulty does the patient currently have...   Turning over in bed (including adjusting bedclothes, sheets and blankets)? 1   Sitting down on and standing up from a chair with arms (e.g., wheelchair, bedside commode, etc.) 1   Moving from lying on back to sitting on the side of the bed? 1   How much help from another person does the patient currently need...   Moving to and from a bed to a chair (including a wheelchair)? 1   Need to walk in a hospital room? 1   Climbing 3-5 steps with a railing? 1   6 clicks Mobility Score 6   Short Term Goals    Short Term Goal # 1 Pt will perform bed mobility with HOB and rails as needed with supervision in 6 visits.    Goal Outcome # 1 goal not met   Short Term Goal # 2 Pt will transfer with min A in 6 visits from bed to bs chair to improve functional indep.    Goal Outcome # 2 Goal not met   Short Term Goal # 3 Pt will ambulate x 25 feet using FWW with Fito in 6 visits to improve functional indep.    Goal Outcome # 3 Goal not met   Education Group   Education Provided Role of Physical Therapist   Role of Physical Therapist Patient Response Patient;Acceptance;Explanation;Verbal Demonstration   Interdisciplinary Plan of Care Collaboration   IDT Collaboration with  Nursing   Patient Position at End of Therapy Seated;In Bed;Call Light within Reach;Tray Table within Reach;Phone within Reach   Collaboration Comments results of PT       Plan    Continue current treatment plan.    Discharge recommendations:  Post Acute Placement

## 2020-07-31 LAB
ANION GAP SERPL CALC-SCNC: 14 MMOL/L (ref 7–16)
BUN SERPL-MCNC: 21 MG/DL (ref 8–22)
CALCIUM SERPL-MCNC: 10.3 MG/DL (ref 8.5–10.5)
CHLORIDE SERPL-SCNC: 101 MMOL/L (ref 96–112)
CO2 SERPL-SCNC: 24 MMOL/L (ref 20–33)
CREAT SERPL-MCNC: 0.85 MG/DL (ref 0.5–1.4)
ERYTHROCYTE [DISTWIDTH] IN BLOOD BY AUTOMATED COUNT: 48.5 FL (ref 35.9–50)
GLUCOSE SERPL-MCNC: 97 MG/DL (ref 65–99)
HCT VFR BLD AUTO: 41.6 % (ref 42–52)
HGB BLD-MCNC: 13.4 G/DL (ref 14–18)
MCH RBC QN AUTO: 29.8 PG (ref 27–33)
MCHC RBC AUTO-ENTMCNC: 32.2 G/DL (ref 33.7–35.3)
MCV RBC AUTO: 92.7 FL (ref 81.4–97.8)
PLATELET # BLD AUTO: 241 K/UL (ref 164–446)
PMV BLD AUTO: 11.3 FL (ref 9–12.9)
POTASSIUM SERPL-SCNC: 4.4 MMOL/L (ref 3.6–5.5)
RBC # BLD AUTO: 4.49 M/UL (ref 4.7–6.1)
SODIUM SERPL-SCNC: 139 MMOL/L (ref 135–145)
WBC # BLD AUTO: 15.2 K/UL (ref 4.8–10.8)

## 2020-07-31 PROCEDURE — 80048 BASIC METABOLIC PNL TOTAL CA: CPT

## 2020-07-31 PROCEDURE — 94640 AIRWAY INHALATION TREATMENT: CPT

## 2020-07-31 PROCEDURE — 770020 HCHG ROOM/CARE - TELE (206)

## 2020-07-31 PROCEDURE — 99232 SBSQ HOSP IP/OBS MODERATE 35: CPT | Performed by: HOSPITALIST

## 2020-07-31 PROCEDURE — 92526 ORAL FUNCTION THERAPY: CPT

## 2020-07-31 PROCEDURE — 85027 COMPLETE CBC AUTOMATED: CPT

## 2020-07-31 PROCEDURE — 700101 HCHG RX REV CODE 250: Performed by: INTERNAL MEDICINE

## 2020-07-31 PROCEDURE — 700102 HCHG RX REV CODE 250 W/ 637 OVERRIDE(OP): Performed by: INTERNAL MEDICINE

## 2020-07-31 PROCEDURE — A9270 NON-COVERED ITEM OR SERVICE: HCPCS | Performed by: INTERNAL MEDICINE

## 2020-07-31 PROCEDURE — 700111 HCHG RX REV CODE 636 W/ 250 OVERRIDE (IP): Performed by: INTERNAL MEDICINE

## 2020-07-31 PROCEDURE — 36415 COLL VENOUS BLD VENIPUNCTURE: CPT

## 2020-07-31 PROCEDURE — 94760 N-INVAS EAR/PLS OXIMETRY 1: CPT

## 2020-07-31 RX ADMIN — IPRATROPIUM BROMIDE AND ALBUTEROL SULFATE 3 ML: .5; 3 SOLUTION RESPIRATORY (INHALATION) at 19:06

## 2020-07-31 RX ADMIN — ENOXAPARIN SODIUM 40 MG: 40 INJECTION SUBCUTANEOUS at 06:27

## 2020-07-31 RX ADMIN — GUAIFENESIN 200 MG: 100 SOLUTION ORAL at 21:31

## 2020-07-31 RX ADMIN — SODIUM CHLORIDE 4 ML: 7 NEBU SOLN,3 % NEBU at 14:38

## 2020-07-31 RX ADMIN — SODIUM CHLORIDE 4 ML: 7 NEBU SOLN,3 % NEBU at 06:59

## 2020-07-31 RX ADMIN — SODIUM CHLORIDE 4 ML: 7 NEBU SOLN,3 % NEBU at 10:43

## 2020-07-31 RX ADMIN — IPRATROPIUM BROMIDE AND ALBUTEROL SULFATE 3 ML: .5; 3 SOLUTION RESPIRATORY (INHALATION) at 14:37

## 2020-07-31 RX ADMIN — GUAIFENESIN 200 MG: 100 SOLUTION ORAL at 09:26

## 2020-07-31 RX ADMIN — GUAIFENESIN 200 MG: 100 SOLUTION ORAL at 15:03

## 2020-07-31 RX ADMIN — SODIUM CHLORIDE 4 ML: 7 NEBU SOLN,3 % NEBU at 19:06

## 2020-07-31 RX ADMIN — IPRATROPIUM BROMIDE AND ALBUTEROL SULFATE 3 ML: .5; 3 SOLUTION RESPIRATORY (INHALATION) at 10:43

## 2020-07-31 RX ADMIN — GUAIFENESIN 200 MG: 100 SOLUTION ORAL at 06:26

## 2020-07-31 RX ADMIN — IPRATROPIUM BROMIDE AND ALBUTEROL SULFATE 3 ML: .5; 3 SOLUTION RESPIRATORY (INHALATION) at 06:59

## 2020-07-31 NOTE — THERAPY
Speech Language Pathology  Daily Treatment     Patient Name: Johann Godoy  Age:  69 y.o., Sex:  male  Medical Record #: [id number]  Today's Date: 7/31/2020     Precautions: Fall Risk, Swallow Precautions ( See Comments)  Comments: HFNC 50/50    Assessment    Pt seen today for f/u dysphagia tx session. Per RN, Pt has been tolerating current diet of LQ3/MT2 textures with no coughing/choking. Pt remains on HFNC with 50% FiO2/50LPM. Pt's O2 SATS remained 92-93% throughout session. Pt was agreeable to PO trials (as noted below.) Pt demonstrated intermittent cough with single sips of thin liquids, which is concerning for possible aspiration/penetration. However, Pt expectorated mucus/phlegm. Pt then consumed single sips of thickened OJ and vanilla pudding with no overt clinical s/sx of aspiration/penetration. When offered fruit cocktail and/or oatmeal, reporting preference for current diet.    At this time, recommend to con't strict adherence to safe swallow precautions and 1:1 supervision, recommend to con't current diet of Liquidised solids, Mildly thick liquids and meds crushed in puree. Please hold PO if any difficulties/concerns or change in status.    Plan    Continue current treatment plan.    Discharge recommendations:  Recommend inpatient transitional care services for continued speech therapy services.      Objective       07/31/20 0940   Dysphagia    Positioning / Behavior Modification Self Monitoring;Modulate Rate or Bite Size;Cough / Clear after Swallow   Other Treatments PO trials of thins, MTL, pudding   Diet / Liquid Recommendation Liquidised (3) - (Nectar Thick Full Liquid);Mildly Thick (2) - (Nectar Thick)   Nutritional Liquid Intake Rating Scale Thickened beverages (mildly thick unless otherwise specified)   Nutritional Food Intake Rating Scale Total oral diet of a single consistency   Nursing Communication Swallow Precaution Sign Posted at Head of Bed   Skilled Intervention Verbal Cueing;Tactile Cueing    Recommended Route of Medication Administration   Medication Administration  Crush all Medications in Puree   Short Term Goals   Short Term Goal # 1 Revised 7/29: Pt will consume a LQ3/MT2 (NTFL) diet without s/s of aspiration, with 1:1 assistance.   Goal Outcome # 1 Progressing as expected   Anticipated Discharge Needs   Therapy Recommendations Upon DC Dysphagia Training;Patient / Family / Caregiver Education

## 2020-07-31 NOTE — CARE PLAN
Problem: Communication  Goal: The ability to communicate needs accurately and effectively will improve  Outcome: PROGRESSING AS EXPECTED     Problem: Safety  Goal: Will remain free from injury  Outcome: PROGRESSING AS EXPECTED  Goal: Will remain free from falls  Outcome: PROGRESSING AS EXPECTED  Note: Fall precautions in place. Bed in lowest position. Non-skid socks in place. Personal possessions within reach. Mobility sign on door. Bed-alarm on. Call light within reach. Pt educated regarding fall prevention and states understanding.        Problem: Knowledge Deficit  Goal: Knowledge of disease process/condition, treatment plan, diagnostic tests, and medications will improve  Outcome: PROGRESSING AS EXPECTED  Note: Pt educated regarding plan of care and medications. All questions answered.        Problem: Respiratory:  Goal: Respiratory status will improve  Outcome: PROGRESSING AS EXPECTED     Problem: Skin Integrity  Goal: Risk for impaired skin integrity will decrease  Outcome: PROGRESSING AS EXPECTED     Problem: Mobility  Goal: Risk for activity intolerance will decrease  Outcome: PROGRESSING AS EXPECTED     Problem: Psychosocial Needs:  Goal: Level of anxiety will decrease  Outcome: PROGRESSING AS EXPECTED

## 2020-07-31 NOTE — PROGRESS NOTES
"Hospital Medicine Daily Progress Note    Date of Service  7/31/2020    Chief Complaint  69 y.o. male admitted 7/17/2020 with SOB    Hospital Course    69 y.o. male who presented 7/17/2020 with acute hypoxic respiratory failure.  He has a previous medical history that includes congestive heart failure, right eye enucleation following trauma, previous stroke, history of MRSA infection, diabetes, COPD, hypertension, coronary artery disease, DVT, hypothyroidism.  He originally presented to [facility] in [facility] and was admitted for possible aspiration pneumonitis.  He was transferred to us after approximately week at that facility due to worsening condition.  On arrival here the patient was on 15 L nonrebreather and satting in the low 90s.  His COVID was negative, subsequent cultures from the sputum grew out gram-positive cocci.  He was treated with 7 days of Zosyn which was completed on July 24.  He required intubation and bronchoscopy on July 21.  During that procedure he was found to have large amount of purulent secretions in the right mainstem and right lower lobe.  The patient went on to self extubate himself on July 22.  At that point he was made DNR however intubation okay after discussion with family.  Patient was transferred out of the ICU on July 29.  His ICU stay was also complicated by small bowel obstruction.  He was treated nonoperatively.         Interval Problem Update  ROS limited by mental status; pt with poor short term recall  States he feels \"OK\".  Admits to some SOB  HFNC 50L/50%  upto bedside this am with staff    Consultants/Specialty  Pulm    Code Status  DNR/I    Disposition  Likely rehab DC once off HFNC    Review of Systems  Review of Systems   Unable to perform ROS: Mental status change        Physical Exam  Temp:  [36.4 °C (97.6 °F)-37.3 °C (99.2 °F)] 37.2 °C (99 °F)  Pulse:  [72-90] 81  Resp:  [16-22] 20  BP: (107-140)/(60-86) 107/62  SpO2:  [90 %-94 %] 93 %    Physical " Exam  Vitals signs reviewed.   Constitutional:       General: He is not in acute distress.     Appearance: He is well-developed and underweight. He is not diaphoretic.   HENT:      Head: Normocephalic and atraumatic.   Eyes:      Conjunctiva/sclera: Conjunctivae normal.   Neck:      Vascular: No JVD.   Cardiovascular:      Rate and Rhythm: Normal rate.      Heart sounds: Murmur present. No gallop.    Pulmonary:      Effort: Pulmonary effort is normal. No respiratory distress.      Breath sounds: No stridor. Rales present. No wheezing.   Abdominal:      Palpations: Abdomen is soft.      Tenderness: There is no abdominal tenderness. There is no guarding or rebound.   Musculoskeletal:         General: No tenderness.      Right lower leg: No edema.      Left lower leg: No edema.   Skin:     General: Skin is warm and dry.      Findings: No rash.   Neurological:      Mental Status: He is oriented to person, place, and time.      Comments: Pt is oriented to person, place, why he's in the hospital but has poor short term recall; can't recall his last meal   Psychiatric:         Thought Content: Thought content normal.         Fluids  No intake or output data in the 24 hours ending 07/31/20 1339    Laboratory  Recent Labs     07/29/20  0509 07/30/20  0443 07/31/20  0322   WBC 16.8* 14.5* 15.2*   RBC 4.38* 4.64* 4.49*   HEMOGLOBIN 12.9* 13.7* 13.4*   HEMATOCRIT 41.3* 43.2 41.6*   MCV 94.3 93.1 92.7   MCH 29.5 29.5 29.8   MCHC 31.2* 31.7* 32.2*   RDW 51.4* 49.1 48.5   PLATELETCT 223 237 241   MPV 11.2 11.1 11.3     Recent Labs     07/29/20  0509 07/30/20  0443 07/31/20  0322   SODIUM 140 139 139   POTASSIUM 3.9 3.9 4.4   CHLORIDE 106 102 101   CO2 24 24 24   GLUCOSE 120* 98 97   BUN 23* 19 21   CREATININE 0.84 0.90 0.85   CALCIUM 9.6 9.9 10.3                   Imaging  DX-CHEST-PORTABLE (1 VIEW)   Final Result         1.  Pulmonary edema and/or infiltrates are identified, which are stable since the prior exam. Trace right  pleural effusion         EC-ECHOCARDIOGRAM COMPLETE W/ CONT   Final Result      [id number] VIEW   Final Result      1.  Mildly prominent small bowel in LEFT upper quadrant, of uncertain significance.  No definite bowel obstruction.   2.  Increased colonic stool suggesting constipation.   3.  Supportive tubing as described above.      DX-CHEST-PORTABLE (1 VIEW)   Final Result         1.  Pulmonary edema and/or infiltrates are identified, which are stable since the prior exam.   2.  Right midlung pulmonary nodule, visible on prior CT yesterday, see CT for further characterization and recommendations.      CT-CTA CHEST PULMONARY ARTERY W/ RECONS   Final Result      1.  No CT evidence for pulmonary emboli.   2.  RIGHT lower lobe atelectasis with bronchial occlusion.   3.  Small LEFT pleural effusion with associated atelectasis.   4.  Numerous ill-defined nodular densities in both lungs which may be inflammatory or neoplastic.  Septic emboli is a consideration.   5.  Supportive tubing as described above.               DX-CHEST-PORTABLE (1 VIEW)   Final Result         1.  Pulmonary edema and/or infiltrates are identified, which are stable since the prior exam.      DX-ABDOMEN FOR TUBE PLACEMENT   Final Result         Feeding tube with tip projecting over the expected area of the stomach fundus.      DX-CHEST-PORTABLE (1 VIEW)   Final Result         1.  Pulmonary edema and/or infiltrates are identified, which are stable since the prior exam.   2.  Small left pleural effusion      DX-CHEST-PORTABLE (1 VIEW)   Final Result         1.  Pulmonary edema and/or infiltrates are identified, which are stable since the prior exam.      DX-CHEST-PORTABLE (1 VIEW)   Final Result      1.  Satisfactory appearance of the ET tube and right IJ catheter.   2.  There is no visible pneumothorax.   3.  Stable perihilar and lower lobe opacities likely due to atelectasis and/or edema.      DX-CHEST-LIMITED (1 VIEW)   Final Result         No  significant interval change.      [id number] VIEW   Final Result      NG tube tip overlies the gastric body.      OUTSIDE IMAGES-CT ABDOMEN /PELVIS   Final Result      OUTSIDE IMAGES-DX CHEST   Final Result      OUTSIDE IMAGES-DX ABDOMEN   Final Result      OUTSIDE IMAGES-CT THORACIC SPINE   Final Result      DX-CHEST-LIMITED (1 VIEW)   Final Result         1.  Pulmonary edema and/or infiltrates.           Assessment/Plan  * Acute respiratory failure with hypoxia (HCC)  Assessment & Plan  Extubated 7/22  Pt is still on HFNC: currently 50%/50L.    O2 requirements are decreasing slowly  Cont O2/RT protocols  Pulmonology is following  Follow volume status closely  Mobilize  IS  Repeat CXR  Status is tenuous      Aspiration pneumonia (HCC)  Assessment & Plan  Has completed 7 days of zosyn  Cont to watch for signs of aspiration    KORIN (acute kidney injury) ([facility])  Assessment & Plan  Resolved  Cont to follow daily BMP    SBO (small bowel obstruction) ([facility])  Assessment & Plan  Treated conservatively and resolved  At present is tolerating po  abd exam is benign  Cont bowel protocol    Hypernatremia  Assessment & Plan  Resolved  Cont to follow volume status and daily BMP    Goals of care, counseling/discussion  Assessment & Plan  At present pt is DNR/I       VTE prophylaxis: enoxaparin

## 2020-07-31 NOTE — PROGRESS NOTES
Bedside report received from previous nurse regarding prior 12 hours.  POC reviewed with pt.  White board updated.  Pt verbalizes understanding.  Call light within reach.  Pt tolerated morning meds. [place]if[place] O2 in place 50%/50L. CPOX connected. Cardiac monitor in place. Fall risk precautions in place. Bed locked and lowered. Pt is A&Oxself this AM. No additional requests at this time.

## 2020-07-31 NOTE — DISCHARGE PLANNING
Anticipated Discharge Disposition: TBD    Action: Per chart review, patient is currently still requiring high flow nasal cannula at 50 liters. PT/OT/SP recommending post acute placement.     Barriers to Discharge: oxygen needs, medical clearance, discharge planning once appropriate    Plan: Case coordination to f/u with treatment team for discharge planning needs

## 2020-08-01 ENCOUNTER — APPOINTMENT (OUTPATIENT)
Dept: RADIOLOGY | Facility: MEDICAL CENTER | Age: 70
DRG: 871 | End: 2020-08-01
Attending: HOSPITALIST
Payer: MEDICARE

## 2020-08-01 LAB
ANION GAP SERPL CALC-SCNC: 10 MMOL/L (ref 7–16)
BUN SERPL-MCNC: 21 MG/DL (ref 8–22)
CALCIUM SERPL-MCNC: 9.6 MG/DL (ref 8.5–10.5)
CHLORIDE SERPL-SCNC: 99 MMOL/L (ref 96–112)
CO2 SERPL-SCNC: 26 MMOL/L (ref 20–33)
CREAT SERPL-MCNC: 0.87 MG/DL (ref 0.5–1.4)
GLUCOSE SERPL-MCNC: 95 MG/DL (ref 65–99)
POTASSIUM SERPL-SCNC: 4.1 MMOL/L (ref 3.6–5.5)
SODIUM SERPL-SCNC: 135 MMOL/L (ref 135–145)

## 2020-08-01 PROCEDURE — 770020 HCHG ROOM/CARE - TELE (206)

## 2020-08-01 PROCEDURE — 700111 HCHG RX REV CODE 636 W/ 250 OVERRIDE (IP): Performed by: HOSPITALIST

## 2020-08-01 PROCEDURE — 99232 SBSQ HOSP IP/OBS MODERATE 35: CPT | Performed by: HOSPITALIST

## 2020-08-01 PROCEDURE — 80048 BASIC METABOLIC PNL TOTAL CA: CPT

## 2020-08-01 PROCEDURE — A9270 NON-COVERED ITEM OR SERVICE: HCPCS | Performed by: INTERNAL MEDICINE

## 2020-08-01 PROCEDURE — 700111 HCHG RX REV CODE 636 W/ 250 OVERRIDE (IP)

## 2020-08-01 PROCEDURE — A9270 NON-COVERED ITEM OR SERVICE: HCPCS | Performed by: HOSPITALIST

## 2020-08-01 PROCEDURE — 94669 MECHANICAL CHEST WALL OSCILL: CPT

## 2020-08-01 PROCEDURE — 700102 HCHG RX REV CODE 250 W/ 637 OVERRIDE(OP): Performed by: INTERNAL MEDICINE

## 2020-08-01 PROCEDURE — 94640 AIRWAY INHALATION TREATMENT: CPT

## 2020-08-01 PROCEDURE — 36415 COLL VENOUS BLD VENIPUNCTURE: CPT

## 2020-08-01 PROCEDURE — 700101 HCHG RX REV CODE 250: Performed by: INTERNAL MEDICINE

## 2020-08-01 PROCEDURE — 94760 N-INVAS EAR/PLS OXIMETRY 1: CPT

## 2020-08-01 PROCEDURE — 700111 HCHG RX REV CODE 636 W/ 250 OVERRIDE (IP): Performed by: INTERNAL MEDICINE

## 2020-08-01 PROCEDURE — 71045 X-RAY EXAM CHEST 1 VIEW: CPT

## 2020-08-01 PROCEDURE — 700102 HCHG RX REV CODE 250 W/ 637 OVERRIDE(OP): Performed by: HOSPITALIST

## 2020-08-01 RX ORDER — FUROSEMIDE 10 MG/ML
60 INJECTION INTRAMUSCULAR; INTRAVENOUS ONCE
Status: COMPLETED | OUTPATIENT
Start: 2020-08-01 | End: 2020-08-01

## 2020-08-01 RX ORDER — FUROSEMIDE 10 MG/ML
INJECTION INTRAMUSCULAR; INTRAVENOUS
Status: COMPLETED
Start: 2020-08-01 | End: 2020-08-01

## 2020-08-01 RX ORDER — FUROSEMIDE 10 MG/ML
40 INJECTION INTRAMUSCULAR; INTRAVENOUS ONCE
Status: COMPLETED | OUTPATIENT
Start: 2020-08-01 | End: 2020-08-01

## 2020-08-01 RX ORDER — SCOLOPAMINE TRANSDERMAL SYSTEM 1 MG/1
1 PATCH, EXTENDED RELEASE TRANSDERMAL
Status: DISCONTINUED | OUTPATIENT
Start: 2020-08-01 | End: 2020-08-07 | Stop reason: HOSPADM

## 2020-08-01 RX ADMIN — GUAIFENESIN 200 MG: 100 SOLUTION ORAL at 21:15

## 2020-08-01 RX ADMIN — ENOXAPARIN SODIUM 40 MG: 40 INJECTION SUBCUTANEOUS at 05:08

## 2020-08-01 RX ADMIN — GUAIFENESIN 200 MG: 100 SOLUTION ORAL at 09:16

## 2020-08-01 RX ADMIN — DOCUSATE SODIUM 50 MG AND SENNOSIDES 8.6 MG 2 TABLET: 8.6; 5 TABLET, FILM COATED ORAL at 17:23

## 2020-08-01 RX ADMIN — SCOLOPAMINE TRANSDERMAL SYSTEM 1 PATCH: 1 PATCH, EXTENDED RELEASE TRANSDERMAL at 15:15

## 2020-08-01 RX ADMIN — FUROSEMIDE 60 MG: 10 INJECTION, SOLUTION INTRAMUSCULAR; INTRAVENOUS at 17:32

## 2020-08-01 RX ADMIN — IPRATROPIUM BROMIDE AND ALBUTEROL SULFATE 3 ML: .5; 3 SOLUTION RESPIRATORY (INHALATION) at 11:40

## 2020-08-01 RX ADMIN — SODIUM CHLORIDE 4 ML: 7 NEBU SOLN,3 % NEBU at 07:22

## 2020-08-01 RX ADMIN — IPRATROPIUM BROMIDE AND ALBUTEROL SULFATE 3 ML: .5; 3 SOLUTION RESPIRATORY (INHALATION) at 20:12

## 2020-08-01 RX ADMIN — GUAIFENESIN 200 MG: 100 SOLUTION ORAL at 05:08

## 2020-08-01 RX ADMIN — SODIUM CHLORIDE 4 ML: 7 NEBU SOLN,3 % NEBU at 11:40

## 2020-08-01 RX ADMIN — GUAIFENESIN 200 MG: 100 SOLUTION ORAL at 17:22

## 2020-08-01 RX ADMIN — FUROSEMIDE 40 MG: 10 INJECTION, SOLUTION INTRAMUSCULAR; INTRAVENOUS at 12:25

## 2020-08-01 RX ADMIN — IPRATROPIUM BROMIDE AND ALBUTEROL SULFATE 3 ML: .5; 3 SOLUTION RESPIRATORY (INHALATION) at 15:26

## 2020-08-01 RX ADMIN — SODIUM CHLORIDE 4 ML: 7 NEBU SOLN,3 % NEBU at 20:12

## 2020-08-01 RX ADMIN — IPRATROPIUM BROMIDE AND ALBUTEROL SULFATE 3 ML: .5; 3 SOLUTION RESPIRATORY (INHALATION) at 07:22

## 2020-08-01 RX ADMIN — SODIUM CHLORIDE 4 ML: 7 NEBU SOLN,3 % NEBU at 15:26

## 2020-08-01 NOTE — CARE PLAN
Pt in bed resting, no s/s of distress, bed in low locked position, side rails up x2, call light within reach. High flow intact with sats above at 88 but up to 98 after changing to 60% 50L from 50/50. Will cont to monitor.

## 2020-08-01 NOTE — CARE PLAN
Problem: Safety  Goal: Will remain free from injury  Outcome: PROGRESSING AS EXPECTED  Goal: Will remain free from falls  Outcome: PROGRESSING AS EXPECTED  Note: Fall precautions in place. Bed in lowest position. Non-skid socks in place. Personal possessions within reach. Mobility sign on door. Bed-alarm on. Call light within reach. Pt educated regarding fall prevention and states understanding.        Problem: Knowledge Deficit  Goal: Knowledge of disease process/condition, treatment plan, diagnostic tests, and medications will improve  Outcome: PROGRESSING AS EXPECTED     Problem: Skin Integrity  Goal: Risk for impaired skin integrity will decrease  Outcome: PROGRESSING AS EXPECTED     Problem: Mobility  Goal: Risk for activity intolerance will decrease  Outcome: PROGRESSING AS EXPECTED     Problem: Psychosocial Needs:  Goal: Level of anxiety will decrease  Outcome: PROGRESSING AS EXPECTED

## 2020-08-01 NOTE — CARE PLAN
Problem: Oxygenation:  Goal: Maintain adequate oxygenation dependent on patient condition  Outcome: PROGRESSING AS EXPECTED  Note:     Respiratory Update    Treatment modality: HHFNC  Frequency:CONT    Pt tolerating current treatments well with no adverse reactions.

## 2020-08-01 NOTE — PROGRESS NOTES
Monitor Summary: SR 73-90. Rare PVC's, Frequent PAC's, Rare bigeminy, occasional couplets. Measurements: 0.18/0.12/0.34.

## 2020-08-01 NOTE — PROGRESS NOTES
Bedside report received from previous nurse regarding prior 12 hours.  POC reviewed with pt.  White board updated.  Pt verbalizes understanding.  Call light within reach.  Pt tolerated morning meds. Pt on Hi corine this AM. CPOX in place. Cardiac monitor in place. All needs currently within reach. No additional requests at this time.

## 2020-08-01 NOTE — PROGRESS NOTES
"Hospital Medicine Daily Progress Note    Date of Service  8/1/2020    Chief Complaint  69 y.o. male admitted 7/17/2020 with SOB    Hospital Course    69 y.o. male who presented 7/17/2020 with acute hypoxic respiratory failure.  He has a previous medical history that includes congestive heart failure, right eye enucleation following trauma, previous stroke, history of MRSA infection, diabetes, COPD, hypertension, coronary artery disease, DVT, hypothyroidism.  He originally presented to [facility] in [facility] and was admitted for possible aspiration pneumonitis.  He was transferred to us after approximately week at that facility due to worsening condition.  On arrival here the patient was on 15 L nonrebreather and satting in the low 90s.  His COVID was negative, subsequent cultures from the sputum grew out gram-positive cocci.  He was treated with 7 days of Zosyn which was completed on July 24.  He required intubation and bronchoscopy on July 21.  During that procedure he was found to have large amount of purulent secretions in the right mainstem and right lower lobe.  The patient went on to self extubate himself on July 22.  At that point he was made DNR however intubation okay after discussion with family.  Patient was transferred out of the ICU on July 29.  His ICU stay was also complicated by small bowel obstruction.  He was treated nonoperatively.         Interval Problem Update  ROS limited by mental status; pt with poor short term recall  States he feels \"fine\"  HFNC 50L/60%  upto bedside this am with staff; still requiring max assist    Consultants/Specialty  Pulm    Code Status  DNR/I    Disposition  Likely rehab DC if he gets off HFNC    Review of Systems  Review of Systems   Unable to perform ROS: Mental status change        Physical Exam  Temp:  [36.5 °C (97.7 °F)-37.3 °C (99.1 °F)] 36.6 °C (97.8 °F)  Pulse:  [72-89] 79  Resp:  [16-32] 20  BP: ()/(53-71) 104/53  SpO2:  [87 %-97 %] " 97 %    Physical Exam  Vitals signs reviewed.   Constitutional:       General: He is not in acute distress.     Appearance: He is well-developed and underweight. He is not diaphoretic.   HENT:      Head: Normocephalic and atraumatic.   Eyes:      Conjunctiva/sclera: Conjunctivae normal.   Neck:      Vascular: No JVD.   Cardiovascular:      Rate and Rhythm: Normal rate.      Heart sounds: Murmur present. No gallop.    Pulmonary:      Effort: Pulmonary effort is normal. No respiratory distress.      Breath sounds: No stridor. Rales present. No wheezing.   Abdominal:      Palpations: Abdomen is soft.      Tenderness: There is no abdominal tenderness. There is no guarding or rebound.   Musculoskeletal:         General: No tenderness.      Right lower leg: No edema.      Left lower leg: No edema.   Skin:     General: Skin is warm and dry.      Findings: No rash.   Neurological:      General: No focal deficit present.      Comments: Pt is oriented to person, place, why he's in the hospital  Does not know the month or president  and has poor short term recall; can't recall his last meal   Psychiatric:         Thought Content: Thought content normal.         Fluids    Intake/Output Summary (Last 24 hours) at 8/1/2020 0620  Last data filed at 8/1/2020 0500  Gross per 24 hour   Intake 840 ml   Output 650 ml   Net 190 ml       Laboratory  Recent Labs     07/30/20  0443 07/31/20  0322   WBC 14.5* 15.2*   RBC 4.64* 4.49*   HEMOGLOBIN 13.7* 13.4*   HEMATOCRIT 43.2 41.6*   MCV 93.1 92.7   MCH 29.5 29.8   MCHC 31.7* 32.2*   RDW 49.1 48.5   PLATELETCT 237 241   MPV 11.1 11.3     Recent Labs     07/30/20  0443 07/31/20  0322   SODIUM 139 139   POTASSIUM 3.9 4.4   CHLORIDE 102 101   CO2 24 24   GLUCOSE 98 97   BUN 19 21   CREATININE 0.90 0.85   CALCIUM 9.9 10.3                   Imaging  DX-CHEST-PORTABLE (1 VIEW)   Final Result         1.  Pulmonary edema and/or infiltrates are identified, which are stable since the prior exam. Trace  right pleural effusion         EC-ECHOCARDIOGRAM COMPLETE W/ CONT   Final Result      [id number] VIEW   Final Result      1.  Mildly prominent small bowel in LEFT upper quadrant, of uncertain significance.  No definite bowel obstruction.   2.  Increased colonic stool suggesting constipation.   3.  Supportive tubing as described above.      DX-CHEST-PORTABLE (1 VIEW)   Final Result         1.  Pulmonary edema and/or infiltrates are identified, which are stable since the prior exam.   2.  Right midlung pulmonary nodule, visible on prior CT yesterday, see CT for further characterization and recommendations.      CT-CTA CHEST PULMONARY ARTERY W/ RECONS   Final Result      1.  No CT evidence for pulmonary emboli.   2.  RIGHT lower lobe atelectasis with bronchial occlusion.   3.  Small LEFT pleural effusion with associated atelectasis.   4.  Numerous ill-defined nodular densities in both lungs which may be inflammatory or neoplastic.  Septic emboli is a consideration.   5.  Supportive tubing as described above.               DX-CHEST-PORTABLE (1 VIEW)   Final Result         1.  Pulmonary edema and/or infiltrates are identified, which are stable since the prior exam.      DX-ABDOMEN FOR TUBE PLACEMENT   Final Result         Feeding tube with tip projecting over the expected area of the stomach fundus.      DX-CHEST-PORTABLE (1 VIEW)   Final Result         1.  Pulmonary edema and/or infiltrates are identified, which are stable since the prior exam.   2.  Small left pleural effusion      DX-CHEST-PORTABLE (1 VIEW)   Final Result         1.  Pulmonary edema and/or infiltrates are identified, which are stable since the prior exam.      DX-CHEST-PORTABLE (1 VIEW)   Final Result      1.  Satisfactory appearance of the ET tube and right IJ catheter.   2.  There is no visible pneumothorax.   3.  Stable perihilar and lower lobe opacities likely due to atelectasis and/or edema.      DX-CHEST-LIMITED (1 VIEW)   Final Result          No significant interval change.      [id number] VIEW   Final Result      NG tube tip overlies the gastric body.      OUTSIDE IMAGES-CT ABDOMEN /PELVIS   Final Result      OUTSIDE IMAGES-DX CHEST   Final Result      OUTSIDE IMAGES-DX ABDOMEN   Final Result      OUTSIDE IMAGES-CT THORACIC SPINE   Final Result      DX-CHEST-LIMITED (1 VIEW)   Final Result         1.  Pulmonary edema and/or infiltrates.      DX-CHEST-PORTABLE (1 VIEW)    (Results Pending)        Assessment/Plan  * Acute respiratory failure with hypoxia (HCC)  Assessment & Plan  Extubated 7/22  Pt is still on HFNC: currently 50%/50L.    O2 requirements are decreasing slowly  Cont O2/RT protocols  Pulmonology is following  Follow volume status closely  Mobilize  IS  Repeat CXR  Status is tenuous      Aspiration pneumonia ([facility])  Assessment & Plan  Has completed 7 days of zosyn  Cont to watch for signs of aspiration    KORIN (acute kidney injury) ([facility])  Assessment & Plan  Resolved  Cont to follow daily BMP    SBO (small bowel obstruction) ([facility])  Assessment & Plan  Treated conservatively and resolved  At present is tolerating po  abd exam is benign  Cont bowel protocol    Hypernatremia  Assessment & Plan  Resolved  Cont to follow volume status and daily BMP    Goals of care, counseling/discussion  Assessment & Plan  At present pt is DNR/I       VTE prophylaxis: enoxaparin

## 2020-08-01 NOTE — PROGRESS NOTES
Chart Check Complete    Monitor Summary:    Rhythm- SR    Rate- 68-93  Ectopy- pac/pvc/big  Measurements- 0.16/0.12/0.38

## 2020-08-02 LAB
ANION GAP SERPL CALC-SCNC: 13 MMOL/L (ref 7–16)
BUN SERPL-MCNC: 24 MG/DL (ref 8–22)
CALCIUM SERPL-MCNC: 10.3 MG/DL (ref 8.5–10.5)
CHLORIDE SERPL-SCNC: 96 MMOL/L (ref 96–112)
CO2 SERPL-SCNC: 30 MMOL/L (ref 20–33)
CREAT SERPL-MCNC: 1.08 MG/DL (ref 0.5–1.4)
GLUCOSE SERPL-MCNC: 101 MG/DL (ref 65–99)
POTASSIUM SERPL-SCNC: 3.9 MMOL/L (ref 3.6–5.5)
SODIUM SERPL-SCNC: 139 MMOL/L (ref 135–145)

## 2020-08-02 PROCEDURE — 700111 HCHG RX REV CODE 636 W/ 250 OVERRIDE (IP): Performed by: INTERNAL MEDICINE

## 2020-08-02 PROCEDURE — 99232 SBSQ HOSP IP/OBS MODERATE 35: CPT | Performed by: HOSPITALIST

## 2020-08-02 PROCEDURE — 80048 BASIC METABOLIC PNL TOTAL CA: CPT

## 2020-08-02 PROCEDURE — 700101 HCHG RX REV CODE 250: Performed by: INTERNAL MEDICINE

## 2020-08-02 PROCEDURE — 94640 AIRWAY INHALATION TREATMENT: CPT

## 2020-08-02 PROCEDURE — 700102 HCHG RX REV CODE 250 W/ 637 OVERRIDE(OP): Performed by: INTERNAL MEDICINE

## 2020-08-02 PROCEDURE — 770020 HCHG ROOM/CARE - TELE (206)

## 2020-08-02 PROCEDURE — A9270 NON-COVERED ITEM OR SERVICE: HCPCS | Performed by: INTERNAL MEDICINE

## 2020-08-02 PROCEDURE — 700111 HCHG RX REV CODE 636 W/ 250 OVERRIDE (IP): Performed by: HOSPITALIST

## 2020-08-02 PROCEDURE — 36415 COLL VENOUS BLD VENIPUNCTURE: CPT

## 2020-08-02 PROCEDURE — 94760 N-INVAS EAR/PLS OXIMETRY 1: CPT

## 2020-08-02 RX ORDER — BUDESONIDE 0.5 MG/2ML
0.5 INHALANT ORAL
Status: DISCONTINUED | OUTPATIENT
Start: 2020-08-02 | End: 2020-08-07 | Stop reason: HOSPADM

## 2020-08-02 RX ADMIN — GUAIFENESIN 200 MG: 100 SOLUTION ORAL at 20:06

## 2020-08-02 RX ADMIN — DOCUSATE SODIUM 50 MG AND SENNOSIDES 8.6 MG 2 TABLET: 8.6; 5 TABLET, FILM COATED ORAL at 17:31

## 2020-08-02 RX ADMIN — ENOXAPARIN SODIUM 40 MG: 40 INJECTION SUBCUTANEOUS at 04:25

## 2020-08-02 RX ADMIN — BUDESONIDE 0.5 MG: 0.5 SUSPENSION RESPIRATORY (INHALATION) at 19:41

## 2020-08-02 RX ADMIN — SODIUM CHLORIDE 4 ML: 7 NEBU SOLN,3 % NEBU at 07:37

## 2020-08-02 RX ADMIN — GUAIFENESIN 200 MG: 100 SOLUTION ORAL at 17:31

## 2020-08-02 RX ADMIN — IPRATROPIUM BROMIDE AND ALBUTEROL SULFATE 3 ML: .5; 3 SOLUTION RESPIRATORY (INHALATION) at 10:05

## 2020-08-02 RX ADMIN — GUAIFENESIN 200 MG: 100 SOLUTION ORAL at 04:25

## 2020-08-02 RX ADMIN — GUAIFENESIN 200 MG: 100 SOLUTION ORAL at 08:27

## 2020-08-02 RX ADMIN — IPRATROPIUM BROMIDE AND ALBUTEROL SULFATE 3 ML: .5; 3 SOLUTION RESPIRATORY (INHALATION) at 07:37

## 2020-08-02 RX ADMIN — SODIUM CHLORIDE 4 ML: 7 NEBU SOLN,3 % NEBU at 10:05

## 2020-08-02 ASSESSMENT — LIFESTYLE VARIABLES
CONSUMPTION TOTAL: NEGATIVE
HAVE YOU EVER FELT YOU SHOULD CUT DOWN ON YOUR DRINKING: NO
EVER FELT BAD OR GUILTY ABOUT YOUR DRINKING: NO
TOTAL SCORE: 0
DOES PATIENT WANT TO STOP DRINKING: NO
TOTAL SCORE: 0
HOW MANY TIMES IN THE PAST YEAR HAVE YOU HAD 5 OR MORE DRINKS IN A DAY: 0
ON A TYPICAL DAY WHEN YOU DRINK ALCOHOL HOW MANY DRINKS DO YOU HAVE: 0
ALCOHOL_USE: NO
HAVE PEOPLE ANNOYED YOU BY CRITICIZING YOUR DRINKING: NO
EVER HAD A DRINK FIRST THING IN THE MORNING TO STEADY YOUR NERVES TO GET RID OF A HANGOVER: NO
TOTAL SCORE: 0
AVERAGE NUMBER OF DAYS PER WEEK YOU HAVE A DRINK CONTAINING ALCOHOL: 0

## 2020-08-02 ASSESSMENT — COGNITIVE AND FUNCTIONAL STATUS - GENERAL
SUGGESTED CMS G CODE MODIFIER MOBILITY: CL
WALKING IN HOSPITAL ROOM: A LOT
DRESSING REGULAR UPPER BODY CLOTHING: A LOT
TOILETING: A LOT
STANDING UP FROM CHAIR USING ARMS: A LOT
HELP NEEDED FOR BATHING: A LOT
MOVING FROM LYING ON BACK TO SITTING ON SIDE OF FLAT BED: A LOT
MOVING TO AND FROM BED TO CHAIR: A LOT
MOBILITY SCORE: 12
DRESSING REGULAR LOWER BODY CLOTHING: A LOT
DAILY ACTIVITIY SCORE: 12
PERSONAL GROOMING: A LOT
SUGGESTED CMS G CODE MODIFIER DAILY ACTIVITY: CL
TURNING FROM BACK TO SIDE WHILE IN FLAT BAD: A LOT
CLIMB 3 TO 5 STEPS WITH RAILING: A LOT
EATING MEALS: A LOT

## 2020-08-02 NOTE — PROGRESS NOTES
Chart Check Complete    Monitor Summary:    Rhythm- SR   Rate- 70-81  Ectopy- pvc coup big trig  Measurements- 0.16/0.14/0.36

## 2020-08-02 NOTE — CARE PLAN
Pt in bed resting, no complaints. High flow intact. Bed in low locked position, side rails up x2, call light within reach, will cont to monitor.

## 2020-08-02 NOTE — PROGRESS NOTES
"Hospital Medicine Daily Progress Note    Date of Service  8/2/2020    Chief Complaint  69 y.o. male admitted 7/17/2020 with SOB    Hospital Course    69 y.o. male who presented 7/17/2020 with acute hypoxic respiratory failure.  He has a previous medical history that includes congestive heart failure, right eye enucleation following trauma, previous stroke, history of MRSA infection, diabetes, COPD, hypertension, coronary artery disease, DVT, hypothyroidism.  He originally presented to [facility], [place], operated by [facility] in [facility] and was admitted for possible aspiration pneumonitis.  He was transferred to us after approximately week at that facility due to worsening condition.  On arrival here the patient was on 15 L nonrebreather and satting in the low 90s.  His COVID was negative, subsequent cultures from the sputum grew out gram-positive cocci.  He was treated with 7 days of Zosyn which was completed on July 24.  He required intubation and bronchoscopy on July 21.  During that procedure he was found to have large amount of purulent secretions in the right mainstem and right lower lobe.  The patient went on to self extubate himself on July 22.  At that point he was made DNR however intubation okay after discussion with family.  Patient was transferred out of the ICU on July 29.  His ICU stay was also complicated by small bowel obstruction.  He was treated nonoperatively.         Interval Problem Update  ROS limited by mental status; pt with poor short term recall  States he feels \"OK\"  HFNC 70L/60%  upto bedside this am with staff; still requiring max assist    Consultants/Specialty  Pulm    Code Status  DNR/I    Disposition  Likely rehab DC if he gets off HFNC    Review of Systems  Review of Systems   Unable to perform ROS: Mental status change        Physical Exam  Temp:  [36.8 °C (98.3 °F)-37.4 °C (99.3 °F)] 37.4 °C (99.3 °F)  Pulse:  [64-87] 70  Resp:  [18-24] 20  BP: (109-123)/(50-70) 114/65  SpO2:  [92 %-96 %] 92 " %    Physical Exam  Vitals signs reviewed.   Constitutional:       General: He is not in acute distress.     Appearance: He is well-developed and underweight. He is not diaphoretic.   HENT:      Head: Normocephalic and atraumatic.   Eyes:      Conjunctiva/sclera: Conjunctivae normal.   Neck:      Vascular: No JVD.   Cardiovascular:      Rate and Rhythm: Normal rate.      Heart sounds: Murmur present. No gallop.    Pulmonary:      Effort: Pulmonary effort is normal. No respiratory distress.      Breath sounds: No stridor. Rales present. No wheezing.   Abdominal:      Palpations: Abdomen is soft.      Tenderness: There is no abdominal tenderness. There is no guarding or rebound.   Musculoskeletal:         General: No tenderness.      Right lower leg: No edema.      Left lower leg: No edema.   Skin:     General: Skin is warm and dry.      Findings: No rash.   Neurological:      General: No focal deficit present.      Comments: Pt is oriented to person, place, why he's in the hospital  Does not know the month or president  and has poor short term recall; can't recall his last meal   Psychiatric:         Thought Content: Thought content normal.         Fluids    Intake/Output Summary (Last 24 hours) at 8/2/2020 0629  Last data filed at 8/2/2020 0400  Gross per 24 hour   Intake 600 ml   Output 2400 ml   Net -1800 ml       Laboratory  Recent Labs     07/31/20  0322   WBC 15.2*   RBC 4.49*   HEMOGLOBIN 13.4*   HEMATOCRIT 41.6*   MCV 92.7   MCH 29.8   MCHC 32.2*   RDW 48.5   PLATELETCT 241   MPV 11.3     Recent Labs     07/31/20  0322 08/01/20  0922   SODIUM 139 135   POTASSIUM 4.4 4.1   CHLORIDE 101 99   CO2 24 26   GLUCOSE 97 95   BUN 21 21   CREATININE 0.85 0.87   CALCIUM 10.3 9.6                   Imaging  DX-CHEST-PORTABLE (1 VIEW)   Final Result         1.  Pulmonary edema and/or infiltrates are identified, which are stable since the prior exam. Trace right pleural effusion            DX-CHEST-PORTABLE (1 VIEW)   Final  Result         1.  Pulmonary edema and/or infiltrates are identified, which are stable since the prior exam. Trace right pleural effusion         EC-ECHOCARDIOGRAM COMPLETE W/ CONT   Final Result      SV-BRTXIAC-4 VIEW   Final Result      1.  Mildly prominent small bowel in LEFT upper quadrant, of uncertain significance.  No definite bowel obstruction.   2.  Increased colonic stool suggesting constipation.   3.  Supportive tubing as described above.      DX-CHEST-PORTABLE (1 VIEW)   Final Result         1.  Pulmonary edema and/or infiltrates are identified, which are stable since the prior exam.   2.  Right midlung pulmonary nodule, visible on prior CT yesterday, see CT for further characterization and recommendations.      CT-CTA CHEST PULMONARY ARTERY W/ RECONS   Final Result      1.  No CT evidence for pulmonary emboli.   2.  RIGHT lower lobe atelectasis with bronchial occlusion.   3.  Small LEFT pleural effusion with associated atelectasis.   4.  Numerous ill-defined nodular densities in both lungs which may be inflammatory or neoplastic.  Septic emboli is a consideration.   5.  Supportive tubing as described above.               DX-CHEST-PORTABLE (1 VIEW)   Final Result         1.  Pulmonary edema and/or infiltrates are identified, which are stable since the prior exam.      DX-ABDOMEN FOR TUBE PLACEMENT   Final Result         Feeding tube with tip projecting over the expected area of the stomach fundus.      DX-CHEST-PORTABLE (1 VIEW)   Final Result         1.  Pulmonary edema and/or infiltrates are identified, which are stable since the prior exam.   2.  Small left pleural effusion      DX-CHEST-PORTABLE (1 VIEW)   Final Result         1.  Pulmonary edema and/or infiltrates are identified, which are stable since the prior exam.      DX-CHEST-PORTABLE (1 VIEW)   Final Result      1.  Satisfactory appearance of the ET tube and right IJ catheter.   2.  There is no visible pneumothorax.   3.  Stable perihilar and  lower lobe opacities likely due to atelectasis and/or edema.      DX-CHEST-LIMITED (1 VIEW)   Final Result         No significant interval change.      DY-BQFVOVM-1 VIEW   Final Result      NG tube tip overlies the gastric body.      OUTSIDE IMAGES-CT ABDOMEN /PELVIS   Final Result      OUTSIDE IMAGES-DX CHEST   Final Result      OUTSIDE IMAGES-DX ABDOMEN   Final Result      OUTSIDE IMAGES-CT THORACIC SPINE   Final Result      DX-CHEST-LIMITED (1 VIEW)   Final Result         1.  Pulmonary edema and/or infiltrates.           Assessment/Plan  * Acute respiratory failure with hypoxia (HCC)  Assessment & Plan  Extubated 7/22  Pt is still on HFNC: currently 60%/70L.    O2 requirements are increasing slowly  Secretions have improved last 48hrs per RT  Cont O2/RT protocols  Pulmonology is following  Follow volume status closely: no improvement with diuresis over last 48hrs  Mobilize  IS  Status is tenuous  Start inhaled steroid and LABA  Dw RT      Aspiration pneumonia (HCC)  Assessment & Plan  Has completed 7 days of zosyn  Cont to watch for signs of aspiration    KORIN (acute kidney injury) (HCC)  Assessment & Plan  Resolved  Cont to follow daily BMP    SBO (small bowel obstruction) ([facility])  Assessment & Plan  Treated conservatively and resolved  At present is tolerating po  abd exam is benign  Cont bowel protocol    Hypernatremia  Assessment & Plan  Resolved  Cont to follow volume status and daily BMP    Goals of care, counseling/discussion  Assessment & Plan  At present pt is DNR/I  DW son 8/1       VTE prophylaxis: enoxaparin

## 2020-08-02 NOTE — PROGRESS NOTES
Upon patient assessment, SPO2 noted to be sustaining in mid-80s. Pt encouraged to take deep breaths and perform IS, SPO2 remained 84-86%. RT contacted. RT at bedside, increased Hi Ovidio O2 settings to 60L and 80% FiO2. MD Eduardo contacted and updated on increased oxygen use. MD states he will place orders for patient. SPO2 94% at this time. CPOX remains in place.

## 2020-08-02 NOTE — PROGRESS NOTES
Received report from Gabe, at pt bedside. Pt A/O x self, pt on High flow NC with , will continue to monitor, POC discussed. Call light and belongings within reach. Bed locked and in low position. Alarm on and fall precautions in place.

## 2020-08-02 NOTE — PROGRESS NOTES
Monitor Summary: SR 63-80. BBB. Frequent PVC's, rare bigeminy, rare triplets. Measurements: 0.16/0.12/0.32

## 2020-08-02 NOTE — CARE PLAN
Problem: Communication  Goal: The ability to communicate needs accurately and effectively will improve  Outcome: PROGRESSING AS EXPECTED     Problem: Safety  Goal: Will remain free from injury  Outcome: PROGRESSING AS EXPECTED     Problem: Knowledge Deficit  Goal: Knowledge of disease process/condition, treatment plan, diagnostic tests, and medications will improve  Outcome: PROGRESSING AS EXPECTED     Problem: Discharge Barriers/Planning  Goal: Patient's continuum of care needs will be met  Outcome: PROGRESSING AS EXPECTED     Problem: Respiratory:  Goal: Respiratory status will improve  Outcome: PROGRESSING AS EXPECTED

## 2020-08-03 LAB
ALBUMIN SERPL BCP-MCNC: 3.5 G/DL (ref 3.2–4.9)
ALBUMIN/GLOB SERPL: 0.9 G/DL
ALP SERPL-CCNC: 54 U/L (ref 30–99)
ALT SERPL-CCNC: 10 U/L (ref 2–50)
ANION GAP SERPL CALC-SCNC: 14 MMOL/L (ref 7–16)
AST SERPL-CCNC: 12 U/L (ref 12–45)
BILIRUB SERPL-MCNC: 0.3 MG/DL (ref 0.1–1.5)
BUN SERPL-MCNC: 31 MG/DL (ref 8–22)
CALCIUM SERPL-MCNC: 10.1 MG/DL (ref 8.5–10.5)
CHLORIDE SERPL-SCNC: 96 MMOL/L (ref 96–112)
CO2 SERPL-SCNC: 26 MMOL/L (ref 20–33)
CREAT SERPL-MCNC: 0.99 MG/DL (ref 0.5–1.4)
GLOBULIN SER CALC-MCNC: 4 G/DL (ref 1.9–3.5)
GLUCOSE SERPL-MCNC: 106 MG/DL (ref 65–99)
POTASSIUM SERPL-SCNC: 4.1 MMOL/L (ref 3.6–5.5)
PROT SERPL-MCNC: 7.5 G/DL (ref 6–8.2)
SODIUM SERPL-SCNC: 136 MMOL/L (ref 135–145)

## 2020-08-03 PROCEDURE — 700111 HCHG RX REV CODE 636 W/ 250 OVERRIDE (IP): Performed by: INTERNAL MEDICINE

## 2020-08-03 PROCEDURE — A9270 NON-COVERED ITEM OR SERVICE: HCPCS | Performed by: INTERNAL MEDICINE

## 2020-08-03 PROCEDURE — 770020 HCHG ROOM/CARE - TELE (206)

## 2020-08-03 PROCEDURE — 94760 N-INVAS EAR/PLS OXIMETRY 1: CPT

## 2020-08-03 PROCEDURE — 700111 HCHG RX REV CODE 636 W/ 250 OVERRIDE (IP): Performed by: HOSPITALIST

## 2020-08-03 PROCEDURE — 99232 SBSQ HOSP IP/OBS MODERATE 35: CPT | Performed by: HOSPITALIST

## 2020-08-03 PROCEDURE — 94640 AIRWAY INHALATION TREATMENT: CPT

## 2020-08-03 PROCEDURE — 36415 COLL VENOUS BLD VENIPUNCTURE: CPT

## 2020-08-03 PROCEDURE — 97535 SELF CARE MNGMENT TRAINING: CPT

## 2020-08-03 PROCEDURE — 80053 COMPREHEN METABOLIC PANEL: CPT

## 2020-08-03 PROCEDURE — 97530 THERAPEUTIC ACTIVITIES: CPT

## 2020-08-03 PROCEDURE — 700102 HCHG RX REV CODE 250 W/ 637 OVERRIDE(OP): Performed by: INTERNAL MEDICINE

## 2020-08-03 RX ADMIN — DOCUSATE SODIUM 50 MG AND SENNOSIDES 8.6 MG 2 TABLET: 8.6; 5 TABLET, FILM COATED ORAL at 04:21

## 2020-08-03 RX ADMIN — BUDESONIDE 0.5 MG: 0.5 SUSPENSION RESPIRATORY (INHALATION) at 07:18

## 2020-08-03 RX ADMIN — ENOXAPARIN SODIUM 40 MG: 40 INJECTION SUBCUTANEOUS at 04:21

## 2020-08-03 RX ADMIN — GUAIFENESIN 200 MG: 100 SOLUTION ORAL at 04:21

## 2020-08-03 RX ADMIN — GUAIFENESIN 200 MG: 100 SOLUTION ORAL at 16:40

## 2020-08-03 RX ADMIN — GUAIFENESIN 200 MG: 100 SOLUTION ORAL at 09:37

## 2020-08-03 RX ADMIN — BUDESONIDE 0.5 MG: 0.5 SUSPENSION RESPIRATORY (INHALATION) at 18:30

## 2020-08-03 RX ADMIN — GUAIFENESIN 200 MG: 100 SOLUTION ORAL at 21:37

## 2020-08-03 ASSESSMENT — COGNITIVE AND FUNCTIONAL STATUS - GENERAL
MOVING TO AND FROM BED TO CHAIR: A LOT
CLIMB 3 TO 5 STEPS WITH RAILING: TOTAL
DRESSING REGULAR LOWER BODY CLOTHING: A LOT
SUGGESTED CMS G CODE MODIFIER MOBILITY: CM
PERSONAL GROOMING: A LOT
STANDING UP FROM CHAIR USING ARMS: A LOT
WALKING IN HOSPITAL ROOM: TOTAL
TOILETING: TOTAL
TURNING FROM BACK TO SIDE WHILE IN FLAT BAD: A LOT
EATING MEALS: A LOT
MOBILITY SCORE: 9
DAILY ACTIVITIY SCORE: 12
MOVING FROM LYING ON BACK TO SITTING ON SIDE OF FLAT BED: UNABLE
SUGGESTED CMS G CODE MODIFIER DAILY ACTIVITY: CL
DRESSING REGULAR UPPER BODY CLOTHING: A LITTLE
HELP NEEDED FOR BATHING: A LOT

## 2020-08-03 ASSESSMENT — GAIT ASSESSMENTS: GAIT LEVEL OF ASSIST: UNABLE TO PARTICIPATE

## 2020-08-03 NOTE — PROGRESS NOTES
Chart Check Complete    Monitor Summary:    Rhythm- SR   Rate- 66-78  Ectopy- pvc/coup  Measurements- 0.16/0.12/0.36

## 2020-08-03 NOTE — CARE PLAN
Problem: Safety  Goal: Will remain free from falls  Outcome: PROGRESSING AS EXPECTED  Intervention: Implement fall precautions  Flowsheets  Taken 8/3/2020 1024  Bed Alarm: Yes - Alarm On  Bedrails: Bedrails Closest to Bathroom Down  Taken 8/3/2020 0800  Environmental Precautions:   Treaded Slipper Socks on Patient   Personal Belongings, Wastebasket, Call Bell etc. in Easy Reach   Transferred to Stronger Side   Report Given to Other Health Care Providers Regarding Fall Risk   Bed in Low Position   Communication Sign for Patients & Families   Mobility Assessed & Appropriate Sign Placed     Problem: Skin Integrity  Goal: Risk for impaired skin integrity will decrease  Outcome: PROGRESSING AS EXPECTED  Intervention: Implement precautions to protect skin integrity in collaboration with the interdisciplinary team  Flowsheets  Taken 8/3/2020 0800 by Dominga Salcido RHUBERT  Skin Preventative Measures:   Pillows in Use for Support / Positioning   Pillows in Use to Float Heels   Waffle Cushion  Bed Types: Pressure Redistribution Mattress (Atmosair)  Friction Interventions: Draw Sheet / Pad Used for Repositioning  PT / OT Involved in Care:   Physical Therapy Involved   Occupational Therapy Involved  Moisturizers:   Moisturizer   Barrier Cream  Patient is Receiving Nutrition: Oral Intake Adequate  Vitamin Therapy in Use: No  Activity: Bed  Assistance / Tolerance for Turning/Repositioning:   Assistance of Two or More   General Weakness  Taken 8/3/2020 1000 by Dominga Salcido RHUBERT  Patient Turns / Repositioning: Left Side  Taken 8/2/2020 0843 by Heron Sebastian R.N.  Nutrition Consult Ordered: Yes, Consult has been Placed

## 2020-08-03 NOTE — DISCHARGE PLANNING
Anticipated Discharge Disposition: LTAC    Action: spoke w/ son Taurus [phone]. Explained LTAC and choice obtained and faxed to [facility]    Barriers to Discharge: LTAC acceptance    Plan: TBD

## 2020-08-03 NOTE — DISCHARGE PLANNING
Received Choice form at 8814  Agency/Facility Name: [facility]   Referral sent per Choice form @ [phone]

## 2020-08-03 NOTE — THERAPY
Is This A New Presentation, Or A Follow-Up?: [facility]   Daily Treatment     Patient Name: Johann Godoy  Age:  69 y.o., Sex:  male  Medical Record #: [id number]  Today's Date: 8/3/2020     Precautions: Fall Risk, Swallow Precautions ( See Comments)    Assessment    Pt seen for follow up PT tx. Pt demonstrated improvements in bed mobility and transfers. Pt provided with max cues for improved postural control when standing. Therapist assisted with BM clean up with several STS and sustained standing. Pt appeared anxious with mobility but remained above 90% throughout tx. Pt required Yankauer suctioning throughout tx due to moderate amount of secretions. PT will cont working with pt while in acute care. Encourage staff to assist pt to chair for meals     Plan    Continue current treatment plan.    Discharge recommendations: Recommend post-acute placement for continued physical therapy services prior to discharge home.            08/03/20 1529   Vitals   O2 (LPM) 50   O2 Delivery Device Heated High Flow Nasal Cannula   Vitals Comments maintained O2 sat during tx   Cognition    Level of Consciousness Alert   Ability To Follow Commands 1 Step   New Learning Impaired   Attention Impaired   Comments pt appeared very anxious and fearful at times.    Sitting Lower Body Exercises   Sitting Lower Body Exercises Yes   Ankle Pumps 1 set of 10;Bilateral   Long Arc Quad 1 set of 10;Bilateral   Other Exercises several STS   Neuro-Muscular Treatments   Neuro-Muscular Treatments Weight Shift Left;Weight Shift Right;Verbal Cuing;Tactile Cuing;Compensatory Strategies;Anterior weight shift   Comments standing balance   Other Treatments   Other Treatments Provided assisted with cristopher-care, suctioning, linen change. 2 skilled hands needed for safety with mobility   Neurological Concerns   Comments R sided lean   Balance   Sitting Balance (Static) Fair   Sitting Balance (Dynamic) Fair -   Standing Balance (Static) Poor +   Standing Balance (Dynamic) Poor   Weight Shift Sitting Fair    Weight Shift Standing Poor   Skilled Intervention Verbal Cuing;Sequencing;Compensatory Strategies   Comments posterior and R sided lean in standing    Gait Analysis   Gait Level Of Assist Unable to Participate   Comments pt completed bed>chair transfer with stepping, but no ambulating due to balance deficits   Bed Mobility    Supine to Sit Minimal Assist   Sit to Supine   (in chair)   Scooting Minimal Assist   Skilled Intervention Verbal Cuing;Sequencing   Functional Mobility   Sit to Stand Moderate Assist   Bed, Chair, Wheelchair Transfer Moderate Assist   Transfer Method Stand Step   Mobility EOB, several STS, bed>chair transfer   Skilled Intervention Verbal Cuing;Compensatory Strategies;Postural Facilitation   Comments O2 levels stable    Short Term Goals    Short Term Goal # 1 Pt will perform bed mobility with HOB and rails as needed with supervision in 6 visits.    Goal Outcome # 1 goal not met   Short Term Goal # 2 Pt will transfer with min A in 6 visits from bed to bs chair to improve functional indep.    Goal Outcome # 2 Goal not met   Short Term Goal # 3 Pt will ambulate x 25 feet using FWW with Fito in 6 visits to improve functional indep.    Goal Outcome # 3 Goal not met   Anticipated Discharge Equipment   DC Equipment Unable To Determine At This Time        How Severe Are Your Warts?: mild

## 2020-08-03 NOTE — CARE PLAN
Pt in bed resting, no s/s of distress. 50%/60L today on high flow. Bed in low locked position, side rails up x3, call light within reach, will cont to monitor.

## 2020-08-04 ENCOUNTER — APPOINTMENT (OUTPATIENT)
Dept: RADIOLOGY | Facility: MEDICAL CENTER | Age: 70
DRG: 871 | End: 2020-08-04
Attending: STUDENT IN AN ORGANIZED HEALTH CARE EDUCATION/TRAINING PROGRAM
Payer: MEDICARE

## 2020-08-04 LAB
ANION GAP SERPL CALC-SCNC: 9 MMOL/L (ref 7–16)
BASE EXCESS BLDA CALC-SCNC: 5 MMOL/L (ref -4–3)
BODY TEMPERATURE: ABNORMAL CENTIGRADE
BUN SERPL-MCNC: 29 MG/DL (ref 8–22)
CALCIUM SERPL-MCNC: 9.9 MG/DL (ref 8.5–10.5)
CHLORIDE SERPL-SCNC: 93 MMOL/L (ref 96–112)
CO2 SERPL-SCNC: 30 MMOL/L (ref 20–33)
CREAT SERPL-MCNC: 0.9 MG/DL (ref 0.5–1.4)
GLUCOSE SERPL-MCNC: 97 MG/DL (ref 65–99)
HCO3 BLDA-SCNC: 31 MMOL/L (ref 17–25)
NT-PROBNP SERPL IA-MCNC: 219 PG/ML (ref 0–125)
PCO2 BLDA: 49.2 MMHG (ref 26–37)
PH BLDA: 7.41 [PH] (ref 7.4–7.5)
PO2 BLDA: 68.2 MMHG (ref 64–87)
POTASSIUM SERPL-SCNC: 4.1 MMOL/L (ref 3.6–5.5)
SAO2 % BLDA: 93.2 % (ref 93–99)
SODIUM SERPL-SCNC: 132 MMOL/L (ref 135–145)

## 2020-08-04 PROCEDURE — 99232 SBSQ HOSP IP/OBS MODERATE 35: CPT | Performed by: INTERNAL MEDICINE

## 2020-08-04 PROCEDURE — 94760 N-INVAS EAR/PLS OXIMETRY 1: CPT

## 2020-08-04 PROCEDURE — 99233 SBSQ HOSP IP/OBS HIGH 50: CPT | Mod: GC | Performed by: INTERNAL MEDICINE

## 2020-08-04 PROCEDURE — 80048 BASIC METABOLIC PNL TOTAL CA: CPT

## 2020-08-04 PROCEDURE — 36415 COLL VENOUS BLD VENIPUNCTURE: CPT

## 2020-08-04 PROCEDURE — A9270 NON-COVERED ITEM OR SERVICE: HCPCS | Performed by: HOSPITALIST

## 2020-08-04 PROCEDURE — 700111 HCHG RX REV CODE 636 W/ 250 OVERRIDE (IP): Performed by: INTERNAL MEDICINE

## 2020-08-04 PROCEDURE — 94640 AIRWAY INHALATION TREATMENT: CPT

## 2020-08-04 PROCEDURE — 83880 ASSAY OF NATRIURETIC PEPTIDE: CPT

## 2020-08-04 PROCEDURE — 92526 ORAL FUNCTION THERAPY: CPT

## 2020-08-04 PROCEDURE — A9270 NON-COVERED ITEM OR SERVICE: HCPCS | Performed by: INTERNAL MEDICINE

## 2020-08-04 PROCEDURE — 700102 HCHG RX REV CODE 250 W/ 637 OVERRIDE(OP): Performed by: INTERNAL MEDICINE

## 2020-08-04 PROCEDURE — 71045 X-RAY EXAM CHEST 1 VIEW: CPT

## 2020-08-04 PROCEDURE — 700111 HCHG RX REV CODE 636 W/ 250 OVERRIDE (IP): Performed by: STUDENT IN AN ORGANIZED HEALTH CARE EDUCATION/TRAINING PROGRAM

## 2020-08-04 PROCEDURE — 82803 BLOOD GASES ANY COMBINATION: CPT

## 2020-08-04 PROCEDURE — 700102 HCHG RX REV CODE 250 W/ 637 OVERRIDE(OP): Performed by: HOSPITALIST

## 2020-08-04 PROCEDURE — 770020 HCHG ROOM/CARE - TELE (206)

## 2020-08-04 RX ORDER — FUROSEMIDE 10 MG/ML
20 INJECTION INTRAMUSCULAR; INTRAVENOUS
Status: DISCONTINUED | OUTPATIENT
Start: 2020-08-04 | End: 2020-08-07 | Stop reason: HOSPADM

## 2020-08-04 RX ADMIN — SCOLOPAMINE TRANSDERMAL SYSTEM 1 PATCH: 1 PATCH, EXTENDED RELEASE TRANSDERMAL at 15:15

## 2020-08-04 RX ADMIN — GUAIFENESIN 200 MG: 100 SOLUTION ORAL at 11:58

## 2020-08-04 RX ADMIN — GUAIFENESIN 200 MG: 100 SOLUTION ORAL at 17:41

## 2020-08-04 RX ADMIN — ENOXAPARIN SODIUM 40 MG: 40 INJECTION SUBCUTANEOUS at 05:17

## 2020-08-04 RX ADMIN — GUAIFENESIN 200 MG: 100 SOLUTION ORAL at 23:49

## 2020-08-04 RX ADMIN — FUROSEMIDE 20 MG: 10 INJECTION, SOLUTION INTRAMUSCULAR; INTRAVENOUS at 17:40

## 2020-08-04 RX ADMIN — GUAIFENESIN 200 MG: 100 SOLUTION ORAL at 05:15

## 2020-08-04 ASSESSMENT — ENCOUNTER SYMPTOMS
FEVER: 0
VOMITING: 0
DOUBLE VISION: 0
ABDOMINAL PAIN: 0
BLURRED VISION: 0
SHORTNESS OF BREATH: 0
PALPITATIONS: 0
NECK PAIN: 0
COUGH: 0
SENSORY CHANGE: 0
CHILLS: 0
NAUSEA: 0
TREMORS: 0
BACK PAIN: 0
CONSTIPATION: 0
FOCAL WEAKNESS: 0
EYE PAIN: 0
HEADACHES: 0
PHOTOPHOBIA: 0
SPEECH CHANGE: 0
ORTHOPNEA: 0
SPUTUM PRODUCTION: 0
MYALGIAS: 0
TINGLING: 0
DIARRHEA: 0
WEIGHT LOSS: 0
HALLUCINATIONS: 0
DIZZINESS: 0

## 2020-08-04 ASSESSMENT — LIFESTYLE VARIABLES: SUBSTANCE_ABUSE: 0

## 2020-08-04 NOTE — CONSULTS
History & Physical Note    Date of Admission: 7/17/20  Admission Status: Inpatient  Attending: Dr. Matos  Senior Resident: Dr. Preston  Contact Number: [phone]    Chief Complaint:   Acute hypoxic hypercapnic respiratory failure     History of Present Illness (HPI):   69 y.o. M presented 7/17/2020 with acute hypoxic respiratory failure.  He has a previous medical history that includes congestive heart failure, right eye enucleation following trauma, previous stroke, history of MRSA infection, diabetes, COPD, hypertension, coronary artery disease, DVT, hypothyroidism.  He originally presented to [facility] in [facility] and was admitted for possible aspiration pneumonitis.  He was transferred to [facility] after approximately one week due to worsening pulmonary status. On arrival to [facility], patient was on 15 L nonrebreather and satting in the low 90s.  His COVID was negative, subsequent cultures from the sputum grew out gram-positive cocci.  He was treated with 7 days of Zosyn which was completed on July 24.  He required intubation and bronchoscopy on July 21.  During that procedure he was found to have a large amount of purulent secretions in the right mainstem and right lower lobe. The patient went on to self extubate himself on July 22.  At that point he was made DNR, however, intubation okay after discussion with family.  Patient was transferred out of the ICU on July 29. Pulmonary medicine was consulted for respiratory failure requiring high flow oxygen 50L at 70% FiO2.     Review of Systems:    Respiratory: Positive for shortness of breath (better). Negative for cough, sputum production and stridor.    Cardiovascular: Negative for chest pain, palpitations, claudication and leg swelling.     Past Medical History:      has a past medical history of KORIN (acute kidney injury) (HCC) (7/17/2020), Aspiration pneumonia ([facility]) (7/27/2020), and ETOH abuse. He also has no past medical history of  Anesthesia, Angina, Arrhythmia, Arthritis, ASTHMA, Backpain, Bronchitis, Cancer (HCC), CATARACT, Congestive heart failure (HCC), COPD, Diabetes, Dialysis, Glaucoma, Heart murmur, Heart valve disease, Hypertension, Indigestion, Infectious disease, Jaundice, Myocardial infarct (HCC), Other specified symptom associated with female genital organs, Pacemaker, Personal history of venous thrombosis and embolism, Psychiatric problem, Rheumatic fever, Seizure (HCC), Stroke ([facility]), Unspecified disorder of thyroid, Unspecified hemorrhagic conditions, or Unspecified urinary incontinence.    Past Surgical History:    has a past surgical history that includes orbital fracture orif (7/27/2009); nasal fracture reduction open (7/27/2009); zygomatic arch orif (7/27/2009); maxilla fracture orif (7/27/2009); and nerve repair (7/27/2009).    Medications:  Prior to Admission Medications   Prescriptions Last Dose Informant Patient Reported? Taking?   ARTIFICIAL TEARS 1.4 % SOLN   Yes No   Sig: Place 1 Drop in both eyes every four hours as needed.   DULCOLAX PO   Yes No   Sig: Take 10 mg by mouth every 24 hours as needed. Take 10 mg by mouth one time daily as needed for constipation    LISINOPRIL 20 MG TABS   Yes No   Sig: Take 20 mg by mouth every day. Take 20 mg by mouth one time daily       Facility-Administered Medications: None        Allergies:   No Known Allergies    Physical Exam:   Vitals:  Temp:  [36.3 °C (97.4 °F)-36.9 °C (98.4 °F)] 36.8 °C (98.2 °F)  Pulse:  [61-85] 61  Resp:  [12-20] 14  BP: (104-120)/(59-69) 113/64  SpO2:  [90 %-98 %] 97 %    Physical Exam  Constitutional:       General: He is not in acute distress.     Appearance: Normal appearance. He is normal weight. He is not ill-appearing, toxic-appearing or diaphoretic.   HENT:      Head: Normocephalic and atraumatic.   Eyes:      Extraocular Movements: Extraocular movements intact.   Cardiovascular:      Rate and Rhythm: Normal rate and regular rhythm.      Heart  sounds: Normal heart sounds.   Pulmonary:      Effort: No respiratory distress.      Breath sounds: No stridor. No wheezing, rhonchi or rales.      Comments: Diminished breath sounds right lower base   Musculoskeletal:      Right lower leg: No edema.      Left lower leg: No edema.   Neurological:      Mental Status: He is alert.        Labs:   Recent Labs     08/02/20  0901 08/03/20  0339 08/04/20  0854   SODIUM 139 136 132*   POTASSIUM 3.9 4.1 4.1   CHLORIDE 96 96 93*   CO2 30 26 30   GLUCOSE 101* 106* 97   BUN 24* 31* 29*     Imaging:   DX-CHEST-PORTABLE (1 VIEW)   Final Result         No significant interval change.      Low lung volumes with hypoventilatory change and bibasilar opacities.      DX-CHEST-PORTABLE (1 VIEW)   Final Result         1.  Pulmonary edema and/or infiltrates are identified, which are stable since the prior exam. Trace right pleural effusion            DX-CHEST-PORTABLE (1 VIEW)   Final Result         1.  Pulmonary edema and/or infiltrates are identified, which are stable since the prior exam. Trace right pleural effusion         EC-ECHOCARDIOGRAM COMPLETE W/ CONT   Final Result      [id number] VIEW   Final Result      1.  Mildly prominent small bowel in LEFT upper quadrant, of uncertain significance.  No definite bowel obstruction.   2.  Increased colonic stool suggesting constipation.   3.  Supportive tubing as described above.      DX-CHEST-PORTABLE (1 VIEW)   Final Result         1.  Pulmonary edema and/or infiltrates are identified, which are stable since the prior exam.   2.  Right midlung pulmonary nodule, visible on prior CT yesterday, see CT for further characterization and recommendations.      CT-CTA CHEST PULMONARY ARTERY W/ RECONS   Final Result      1.  No CT evidence for pulmonary emboli.   2.  RIGHT lower lobe atelectasis with bronchial occlusion.   3.  Small LEFT pleural effusion with associated atelectasis.   4.  Numerous ill-defined nodular densities in both lungs which  may be inflammatory or neoplastic.  Septic emboli is a consideration.   5.  Supportive tubing as described above.               DX-CHEST-PORTABLE (1 VIEW)   Final Result         1.  Pulmonary edema and/or infiltrates are identified, which are stable since the prior exam.      DX-ABDOMEN FOR TUBE PLACEMENT   Final Result         Feeding tube with tip projecting over the expected area of the stomach fundus.      DX-CHEST-PORTABLE (1 VIEW)   Final Result         1.  Pulmonary edema and/or infiltrates are identified, which are stable since the prior exam.   2.  Small left pleural effusion      DX-CHEST-PORTABLE (1 VIEW)   Final Result         1.  Pulmonary edema and/or infiltrates are identified, which are stable since the prior exam.      DX-CHEST-PORTABLE (1 VIEW)   Final Result      1.  Satisfactory appearance of the ET tube and right IJ catheter.   2.  There is no visible pneumothorax.   3.  Stable perihilar and lower lobe opacities likely due to atelectasis and/or edema.      DX-CHEST-LIMITED (1 VIEW)   Final Result         No significant interval change.      [id number] VIEW   Final Result      NG tube tip overlies the gastric body.      OUTSIDE IMAGES-CT ABDOMEN /PELVIS   Final Result      OUTSIDE IMAGES-DX CHEST   Final Result      OUTSIDE IMAGES-DX ABDOMEN   Final Result      OUTSIDE IMAGES-CT THORACIC SPINE   Final Result      DX-CHEST-LIMITED (1 VIEW)   Final Result         1.  Pulmonary edema and/or infiltrates.              Problem Representation:     Acute hypoxic respiratory failure with chronic hypercapnia     * Acute respiratory failure with hypoxia and chronic hypercapnia (HCC)  Assessment & Plan  Due to aspiration pneumonia/pneumonitis and likely chronic hypercapnia from COPD compensated and normal pH   CTA from OSH per report no PE, BB ATX versus infiltrate  Component of atelectasis on x-ray as well       CT with dense RLL consolidation s/p PNA treatment -7 days Zosyn-bleed 7/24        DNR/DNI    7/18 COVID SARS CoV2 PCR negative  7/18 Sputum Cx gram stain GPC, Cx pending  7/20 started on scopolamine patch due to increased oral secretions  7/21 intubated, bronched - large amount purulent secretions  7/22 self-extubated  Since he has been on HFNC, actually decreased requirements and clinically appears comfortable     Plan;   Continue HFNC, keep sat 88-92% try to wean FiO2 requirements down   Start iv lasix as CXR shows interstitial edema 20 mg iv BID and monitor strict I&Os  Limit sodium and fluid intake, NT-proBNP slighty elevated 219   Repeat blood gas shows compensated respiratory acidosis pH 7.41, Pco2 49.2, Hco3 31  Mobilize- incentive spirometry as oxygenation allows  Goal O2 88-92%   Continue mucolytic's  Transition to oxygen mask and then nasal cannula when clinically appropriate    Aspiration pneumonia (HCC)  Assessment & Plan  Status post 7 days antibiotics-Zosyn-completed 7/24  Imaging revealed dense pneumonic opacities right lung/ATX  Aggressive pulmonary toilet as tolerated with IS and frequent ambulation   Nebulized treatments      KORIN (acute kidney injury) (HCC)  Assessment & Plan  Creatinine normalized, continue serial BMP, normalized with time/hydration  CT abdomen pelvis from OSH did not mention hydronephrosis or obstruction  Monitor urine output  Avoid nephrotoxins    Goals of care, counseling/discussion  Assessment & Plan  Palliative care following  Son Taurus describes his dad typically avoid health care and wouldn't any of this current level of care current getting family to come in and say goodbye vs trying to get back to [facility] and then plan towards focusing on his comfort   DNR/DNI  If he was to worsen Taurus would like to transitioning to comfort measures  Family conference 7/28 father and son  SANTO completed/signed

## 2020-08-04 NOTE — CARE PLAN
Problem: Nutritional:  Goal: Achieve adequate nutritional intake  Description: Patient will consume ~50% or > of meals/supplements  Outcome: PROGRESSING SLOWER THAN EXPECTED

## 2020-08-04 NOTE — THERAPY
"Speech Language Pathology  Daily Treatment     Patient Name: Johann Godoy  Age:  69 y.o., Sex:  male  Medical Record #: [id number]  Today's Date: 8/4/2020     Precautions  Precautions: (P) Fall Risk, Swallow Precautions ( See Comments)  Comments: (P) HFNC    Assessment    Pt seen this date for dysphagia intervention with LQ3/MT2 breakfast tray. PO trials of minced and moist x3  and thins x2 assessed in addition to mealtray. Hyolaryngeal elevation palpated as complete, timely initiation of swallow appreciated and vocal quality remained at hoarse baseline throughout. Recent CXR 8/1/2020 indicated \"Pulmonary edema and/or infiltrates are identified, which are stable since the prior exam. Trace right pleural effusion.\"    Throat clear appreciated in 20% of trials of MTL, however, vocal quality remained at baseline. Pt reports he only consumes the drinks, fruits and boosts from his mealtrays, and declines the liquidized entrees. Pt required mod cues to clear throat/cough throughout mealtray, of note, pt presents with weak throat clear.     Pt demonstrated prolonged but functional mastication of minced and moist with no oral residue appreciated. Pt declined further MM5 trials and stated he could not consume an entire meal that required mastication to that degree. No s/sx of aspiration appreciated with cupsip of thins x2, pt declining to complete further trials of thins.     Recommend continuation of LQ3/MT2 diet with adherence to the following: upright at 90* for PO, slow rate, small bites, no straws, 1:1 supervision, cough/throat clear with wet vocal quality. SLP following.       Plan    Continue current treatment plan.    Discharge recommendations:  Recommend inpatient transitional care services for continued speech therapy services.      Subjective    Pt awake, alert, followed directions and participated with encouragement. Pt appears anxious about trialing advanced textures and verbalized preference for current diet. Pt " on 50L HHFNC at 70% FiO2 during session.      Objective       08/04/20 0923   Dysphagia    Dysphagia X   Positioning / Behavior Modification Self Monitoring;Modulate Rate or Bite Size;Cough / Clear after Swallow   Other Treatments LQ3/MT2 breakfast tray, PO trials MM5 and thins   Diet / Liquid Recommendation Liquidised (3) - (Nectar Thick Full Liquid);Mildly Thick (2) - (Nectar Thick)   Nutritional Liquid Intake Rating Scale Thickened beverages (mildly thick unless otherwise specified)   Nutritional Food Intake Rating Scale Total oral diet of a single consistency   Nursing Communication Swallow Precaution Sign Posted at Head of Bed   Skilled Intervention Compensatory Strategies;Verbal Cueing   Recommended Route of Medication Administration   Medication Administration  Crush all Medications in Puree   Short Term Goals   Short Term Goal # 1 Revised 7/29: Pt will consume a LQ3/MT2 (NTFL) diet without s/s of aspiration, with 1:1 assistance.   Goal Outcome # 1 Progressing as expected

## 2020-08-04 NOTE — DIETARY
Nutrition Services: Update   Day 18 of admit.  Johann Godoy is a 69 y.o. male with admitting DX of Sepsis.    Pt is currently on regular, level 3-liquidized diet with NTL. Avg PO intake of meals is 35% with range of < 25% to %. Pt is also receiving Boost Plus. He is drinking % of this. Per SLP note, pt is drinking only the drinks, fruit and Boost supplements. He declines the liquidized entrees. Pt confirmed this. He is agreeable to adding Chobani Smoothies to his meals for additional kcals and protein.    Wt 7/29/20: 86.6 kg via bed scale - Weight loss noted since admit; however, wt has been stable since 7/23/20.     Labs: 8/4/20: sodium=132 (L)    Malnutrition Risk: does not meet criteria    Recommendations/Plan:  1. Add Chobani Smoothies to meals TID.    2. Encourage intake of meals  3. Document intake of all meals as % taken in ADL's to provide interdisciplinary communication across all shifts.   4. Monitor weight.  5. Nutrition rep will continue to see patient for ongoing meal and snack preferences.    RD following

## 2020-08-04 NOTE — PROGRESS NOTES
"Hospital Medicine Daily Progress Note    Date of Service  8/4/2020    Chief Complaint  69 y.o. male admitted 7/17/2020 with SOB    Hospital Course  As per Dr. Sanderson note      \"69 y.o. male who presented 7/17/2020 with acute hypoxic respiratory failure.  He has a previous medical history that includes congestive heart failure, right eye enucleation following trauma, previous stroke, history of MRSA infection, diabetes, COPD, hypertension, coronary artery disease, DVT, hypothyroidism.  He originally presented to [facility] in [facility] and was admitted for possible aspiration pneumonitis.  He was transferred to us after approximately week at that facility due to worsening condition.  On arrival here the patient was on 15 L nonrebreather and satting in the low 90s.  His COVID was negative, subsequent cultures from the sputum grew out gram-positive cocci.  He was treated with 7 days of Zosyn which was completed on July 24.  He required intubation and bronchoscopy on July 21.  During that procedure he was found to have large amount of purulent secretions in the right mainstem and right lower lobe.  The patient went on to self extubate himself on July 22.  At that point he was made DNR however intubation okay after discussion with family.  Patient was transferred out of the ICU on July 29.  His ICU stay was also complicated by small bowel obstruction.  He was treated nonoperatively\".         Interval Problem Update    I evaluated and examined him at the bedside.  He denies any symptoms of pain and shortness of breath.  He is a still requiring high flow oxygen currently on 50 L and 70% Fi02 of oxygen.  I discussed plan of care with him.  I requested consult with pulmonary.  I personally discussed case with Dr. izquierdo pulmonologist over phone.    Consultants/Specialty  Pulmonary    Code Status  DNR/I    Disposition  ?LTACH: referal placed    Review of Systems  Review of Systems   Constitutional: Negative " for chills, fever and weight loss.   HENT: Negative for hearing loss and tinnitus.    Eyes: Negative for blurred vision, double vision, photophobia and pain.   Respiratory: Negative for cough, sputum production and shortness of breath.    Cardiovascular: Negative for chest pain, palpitations, orthopnea and leg swelling.   Gastrointestinal: Negative for abdominal pain, constipation, diarrhea, nausea and vomiting.   Genitourinary: Negative for dysuria, frequency and urgency.   Musculoskeletal: Negative for back pain, joint pain, myalgias and neck pain.   Skin: Negative for rash.   Neurological: Negative for dizziness, tingling, tremors, sensory change, speech change, focal weakness and headaches.   Psychiatric/Behavioral: Negative for hallucinations and substance abuse.   All other systems reviewed and are negative.       Physical Exam  Temp:  [36.4 °C (97.5 °F)-36.9 °C (98.4 °F)] 36.4 °C (97.5 °F)  Pulse:  [64-85] 64  Resp:  [12-20] 12  BP: (106-120)/(59-74) 112/65  SpO2:  [90 %-98 %] 96 %    Physical Exam  Vitals signs reviewed.   Constitutional:       General: He is not in acute distress.  HENT:      Head: Normocephalic and atraumatic. No contusion.      Right Ear: External ear normal.      Left Ear: External ear normal.      Nose: Nose normal.      Mouth/Throat:      Mouth: Mucous membranes are dry.      Pharynx: No oropharyngeal exudate.   Eyes:      General:         Right eye: No discharge.         Left eye: No discharge.      Pupils: Pupils are equal, round, and reactive to light.      Comments: Chronic eye changes   Neck:      Musculoskeletal: No neck rigidity or muscular tenderness.   Cardiovascular:      Rate and Rhythm: Normal rate and regular rhythm.      Heart sounds: Murmur present. No friction rub. No gallop.    Pulmonary:      Effort: Pulmonary effort is normal.      Breath sounds: Rales present. No wheezing or rhonchi.   Abdominal:      General: Bowel sounds are normal. There is no distension.       Palpations: Abdomen is soft.      Tenderness: There is no abdominal tenderness. There is no rebound.   Musculoskeletal: Normal range of motion.         General: No swelling or tenderness.   Skin:     General: Skin is warm and dry.      Coloration: Skin is not jaundiced.   Neurological:      General: No focal deficit present.      Mental Status: He is alert.      Cranial Nerves: No cranial nerve deficit.      Sensory: No sensory deficit.   Psychiatric:         Mood and Affect: Mood normal.         Fluids    Intake/Output Summary (Last 24 hours) at 8/4/2020 0822  Last data filed at 8/4/2020 0636  Gross per 24 hour   Intake 480 ml   Output 200 ml   Net 280 ml       Laboratory      Recent Labs     08/01/20  0922 08/02/20  0901 08/03/20  0339   SODIUM 135 139 136   POTASSIUM 4.1 3.9 4.1   CHLORIDE 99 96 96   CO2 26 30 26   GLUCOSE 95 101* 106*   BUN 21 24* 31*   CREATININE 0.87 1.08 0.99   CALCIUM 9.6 10.3 10.1                   Imaging  DX-CHEST-PORTABLE (1 VIEW)   Final Result         1.  Pulmonary edema and/or infiltrates are identified, which are stable since the prior exam. Trace right pleural effusion            DX-CHEST-PORTABLE (1 VIEW)   Final Result         1.  Pulmonary edema and/or infiltrates are identified, which are stable since the prior exam. Trace right pleural effusion         EC-ECHOCARDIOGRAM COMPLETE W/ CONT   Final Result      [id number] VIEW   Final Result      1.  Mildly prominent small bowel in LEFT upper quadrant, of uncertain significance.  No definite bowel obstruction.   2.  Increased colonic stool suggesting constipation.   3.  Supportive tubing as described above.      DX-CHEST-PORTABLE (1 VIEW)   Final Result         1.  Pulmonary edema and/or infiltrates are identified, which are stable since the prior exam.   2.  Right midlung pulmonary nodule, visible on prior CT yesterday, see CT for further characterization and recommendations.      CT-CTA CHEST PULMONARY ARTERY W/ RECONS   Final  Result      1.  No CT evidence for pulmonary emboli.   2.  RIGHT lower lobe atelectasis with bronchial occlusion.   3.  Small LEFT pleural effusion with associated atelectasis.   4.  Numerous ill-defined nodular densities in both lungs which may be inflammatory or neoplastic.  Septic emboli is a consideration.   5.  Supportive tubing as described above.               DX-CHEST-PORTABLE (1 VIEW)   Final Result         1.  Pulmonary edema and/or infiltrates are identified, which are stable since the prior exam.      DX-ABDOMEN FOR TUBE PLACEMENT   Final Result         Feeding tube with tip projecting over the expected area of the stomach fundus.      DX-CHEST-PORTABLE (1 VIEW)   Final Result         1.  Pulmonary edema and/or infiltrates are identified, which are stable since the prior exam.   2.  Small left pleural effusion      DX-CHEST-PORTABLE (1 VIEW)   Final Result         1.  Pulmonary edema and/or infiltrates are identified, which are stable since the prior exam.      DX-CHEST-PORTABLE (1 VIEW)   Final Result      1.  Satisfactory appearance of the ET tube and right IJ catheter.   2.  There is no visible pneumothorax.   3.  Stable perihilar and lower lobe opacities likely due to atelectasis and/or edema.      DX-CHEST-LIMITED (1 VIEW)   Final Result         No significant interval change.      [id number] VIEW   Final Result      NG tube tip overlies the gastric body.      OUTSIDE IMAGES-CT ABDOMEN /PELVIS   Final Result      OUTSIDE IMAGES-DX CHEST   Final Result      OUTSIDE IMAGES-DX ABDOMEN   Final Result      OUTSIDE IMAGES-CT THORACIC SPINE   Final Result      DX-CHEST-LIMITED (1 VIEW)   Final Result         1.  Pulmonary edema and/or infiltrates.           Assessment/Plan  * Acute respiratory failure with hypoxia (HCC)  Assessment & Plan  Extubated 7/22  Pt is still on HFNC: currentlty requiring 50 L of oxygen.  Secretions have improved last 48hrs per RT  Cont O2/RT protocols  Pulmonology is  following  Follow volume status closely: no improvement with diuresis over last 48hrs  Mobilize  IS  Status is tenuous  Continue to monitor respiratory status closely.    Aspiration pneumonia (HCC)  Assessment & Plan  Has completed 7 days of zosyn  1:1 feeding with RN staff  Aspiration precautions.    KORIN (acute kidney injury) (HCC)  Assessment & Plan  Now resolved  Continue to monitor with BMP.    SBO (small bowel obstruction) (HCC)  Assessment & Plan  Treated conservatively and resolved  Currently he is tolerating p.o. diet.  Abdominal exam did not show any signs of acute abdomen.  Continue bowel protocol.      Hypernatremia  Assessment & Plan  Resolved  Continue to monitor.    Goals of care, counseling/discussion  Assessment & Plan  At present pt is DNR/I  DW son 8/1     I discussed plan of care during multidisciplinary rounds.    I requested consult with pulmonology and discussed case with Dr. Matos over phone regarding his current medical condition.    I certify that the patient requires continued medically necessary hospital services for the treatment of acute respiratory failure with hypoxia and will remain in the hospital for 4 days.  Discharge plan is anticipated to be this week.      VTE prophylaxis: enoxaparin

## 2020-08-04 NOTE — THERAPY
"Occupational Therapy  Daily Treatment     Patient Name: Johann Godoy  Age:  69 y.o., Sex:  male  Medical Record #: [id number]  Today's Date: 8/3/2020    Precautions: Fall Risk, Swallow Precautions ( See Comments)  Comments: HFNC    Assessment    Pt seen for OT tx.  Pt noted to be drooling supine in bed & had difficulty managing his secretions & using Yankar.  Pt was Min A for supine to sit EOB.  Pt stood with Mod A & had a BM.  Pt had to stand multiple times for up to 2 min for cristopher cleaning.  Pt demonstrated improved activity tolerance & was able to maintain his O2 sats >90% during activity.  Pt will need Post Acute OT services once medically cleared.    Plan    Continue current treatment plan.    Discharge recommendations:  Recommend post-acute placement for additional occupational therapy services prior to discharge home.      Subjective    \"Can I have some water to drink\"     Objective       08/03/20 1549   Vitals   O2 (LPM) 50   O2 Delivery Device Heated High Flow Nasal Cannula   Vitals Comments Pt able to maintain O2 sats >90% during tx   Cognition    Cognition / Consciousness X   Speech/ Communication Delayed Responses;Hard of Hearing   Level of Consciousness Alert   Ability To Follow Commands 1 Step   New Learning Impaired   Comments Pt had delayed responses but was able to follow simple commands   Balance   Sitting Balance (Static) Fair   Sitting Balance (Dynamic) Fair -   Standing Balance (Static) Poor +   Standing Balance (Dynamic) Poor   Weight Shift Sitting Fair   Weight Shift Standing Poor   Comments pt had post lean in standing   Activities of Daily Living   Eating Moderate Assist   Grooming Moderate Assist;Seated   Bathing Maximal Assist   Upper Body Dressing Moderate Assist   Lower Body Dressing Maximal Assist   Toileting Total Assist   Comments pt required total A for cristopher cleaning after BM   Functional Mobility   Sit to Stand Moderate Assist   Bed, Chair, Wheelchair Transfer Moderate Assist "   Transfer Method Stand Pivot   Comments pt with improved tolerance in both sitting & standing

## 2020-08-04 NOTE — PROGRESS NOTES
Assumed care of pt, bedside report received from NICCI Orr. Call light within reach. Bed alarm on.

## 2020-08-04 NOTE — DISCHARGE PLANNING
Per Ese with [facility], they are reviewing referral and she will get back to this RN CM with possible acceptance and bed availability.

## 2020-08-04 NOTE — WOUND TEAM
Pt seen for foot/nail care. Toenails noted to be overgrown and mycotic. Toes wrapped with soapy, wet washcloths prior to trimming and filing of nails. Trimming and filing completed without difficulty, knicked R 3rd toe r/t pt unable to keep feet from moving. Bleeding stopped easily with pressure. Cleaned and then wiped with alcohol wipe. Moisturizer applied to both feet prior to replacing slipper-socks.

## 2020-08-04 NOTE — CARE PLAN
Problem: Communication  Goal: The ability to communicate needs accurately and effectively will improve  Outcome: PROGRESSING AS EXPECTED  Intervention: [place] patient and significant other/support system to call light to alert staff of needs  Flowsheets (Taken 8/3/2020 2252)  Oriented to:: All of the Following : Location of Bathroom, Visiting Policy, Unit Routine, Call Light and Bedside Controls, Bedside Rail Policy, Smoking Policy, Rights and Responsibilities, Bedside Report, and Patient Education Notebook     Problem: Safety  Goal: Will remain free from falls  Outcome: PROGRESSING AS EXPECTED  Intervention: Implement fall precautions  Flowsheets (Taken 8/3/2020 2252)  Environmental Precautions:   Personal Belongings, Wastebasket, Call Bell etc. in Easy Reach   Treaded Slipper Socks on Patient   Report Given to Other Health Care Providers Regarding Fall Risk   Transferred to Stronger Side   Bed in Low Position   Communication Sign for Patients & Families   Mobility Assessed & Appropriate Sign Placed  Chair/Bed Strip Alarm: Yes - Alarm On

## 2020-08-04 NOTE — PROGRESS NOTES
Monitor Summary  Rhythm: SR  Rate: 67-88  Ectopy: (F) PVC, (R) [place], (O) PAC, 4 beats of Vtach, BBB  .16 / .12 / .38

## 2020-08-05 ENCOUNTER — APPOINTMENT (OUTPATIENT)
Dept: RADIOLOGY | Facility: MEDICAL CENTER | Age: 70
DRG: 871 | End: 2020-08-05
Attending: STUDENT IN AN ORGANIZED HEALTH CARE EDUCATION/TRAINING PROGRAM
Payer: MEDICARE

## 2020-08-05 LAB
ANION GAP SERPL CALC-SCNC: 12 MMOL/L (ref 7–16)
BUN SERPL-MCNC: 27 MG/DL (ref 8–22)
CALCIUM SERPL-MCNC: 10 MG/DL (ref 8.5–10.5)
CHLORIDE SERPL-SCNC: 94 MMOL/L (ref 96–112)
CO2 SERPL-SCNC: 30 MMOL/L (ref 20–33)
CREAT SERPL-MCNC: 0.86 MG/DL (ref 0.5–1.4)
ERYTHROCYTE [DISTWIDTH] IN BLOOD BY AUTOMATED COUNT: 46.1 FL (ref 35.9–50)
GLUCOSE SERPL-MCNC: 94 MG/DL (ref 65–99)
HCT VFR BLD AUTO: 43.5 % (ref 42–52)
HGB BLD-MCNC: 13.7 G/DL (ref 14–18)
MCH RBC QN AUTO: 29.2 PG (ref 27–33)
MCHC RBC AUTO-ENTMCNC: 31.5 G/DL (ref 33.7–35.3)
MCV RBC AUTO: 92.8 FL (ref 81.4–97.8)
PLATELET # BLD AUTO: 299 K/UL (ref 164–446)
PMV BLD AUTO: 12.1 FL (ref 9–12.9)
POTASSIUM SERPL-SCNC: 3.9 MMOL/L (ref 3.6–5.5)
RBC # BLD AUTO: 4.69 M/UL (ref 4.7–6.1)
SODIUM SERPL-SCNC: 136 MMOL/L (ref 135–145)
WBC # BLD AUTO: 11.7 K/UL (ref 4.8–10.8)

## 2020-08-05 PROCEDURE — 770020 HCHG ROOM/CARE - TELE (206)

## 2020-08-05 PROCEDURE — 700102 HCHG RX REV CODE 250 W/ 637 OVERRIDE(OP): Performed by: INTERNAL MEDICINE

## 2020-08-05 PROCEDURE — 36415 COLL VENOUS BLD VENIPUNCTURE: CPT

## 2020-08-05 PROCEDURE — 94640 AIRWAY INHALATION TREATMENT: CPT

## 2020-08-05 PROCEDURE — 93970 EXTREMITY STUDY: CPT

## 2020-08-05 PROCEDURE — 99233 SBSQ HOSP IP/OBS HIGH 50: CPT | Mod: GC | Performed by: INTERNAL MEDICINE

## 2020-08-05 PROCEDURE — 700111 HCHG RX REV CODE 636 W/ 250 OVERRIDE (IP): Performed by: STUDENT IN AN ORGANIZED HEALTH CARE EDUCATION/TRAINING PROGRAM

## 2020-08-05 PROCEDURE — A9270 NON-COVERED ITEM OR SERVICE: HCPCS | Performed by: INTERNAL MEDICINE

## 2020-08-05 PROCEDURE — 700111 HCHG RX REV CODE 636 W/ 250 OVERRIDE (IP): Performed by: INTERNAL MEDICINE

## 2020-08-05 PROCEDURE — 85027 COMPLETE CBC AUTOMATED: CPT

## 2020-08-05 PROCEDURE — 700111 HCHG RX REV CODE 636 W/ 250 OVERRIDE (IP): Performed by: HOSPITALIST

## 2020-08-05 PROCEDURE — 99232 SBSQ HOSP IP/OBS MODERATE 35: CPT | Performed by: INTERNAL MEDICINE

## 2020-08-05 PROCEDURE — 80048 BASIC METABOLIC PNL TOTAL CA: CPT

## 2020-08-05 RX ADMIN — FUROSEMIDE 20 MG: 10 INJECTION, SOLUTION INTRAMUSCULAR; INTRAVENOUS at 04:57

## 2020-08-05 RX ADMIN — ENOXAPARIN SODIUM 40 MG: 40 INJECTION SUBCUTANEOUS at 04:57

## 2020-08-05 RX ADMIN — BUDESONIDE 0.5 MG: 0.5 SUSPENSION RESPIRATORY (INHALATION) at 07:43

## 2020-08-05 RX ADMIN — GUAIFENESIN 200 MG: 100 SOLUTION ORAL at 04:57

## 2020-08-05 RX ADMIN — BUDESONIDE 0.5 MG: 0.5 SUSPENSION RESPIRATORY (INHALATION) at 21:02

## 2020-08-05 RX ADMIN — FUROSEMIDE 20 MG: 10 INJECTION, SOLUTION INTRAMUSCULAR; INTRAVENOUS at 17:05

## 2020-08-05 ASSESSMENT — ENCOUNTER SYMPTOMS
BLURRED VISION: 0
SPEECH CHANGE: 0
TREMORS: 0
NECK PAIN: 0
DIZZINESS: 0
CHILLS: 0
NAUSEA: 0
VOMITING: 0
EYE PAIN: 0
ORTHOPNEA: 0
SPUTUM PRODUCTION: 0
PALPITATIONS: 0
FOCAL WEAKNESS: 0
SENSORY CHANGE: 0
FEVER: 0
ABDOMINAL PAIN: 0
CONSTIPATION: 0
BACK PAIN: 0
HALLUCINATIONS: 0
DIARRHEA: 0
TINGLING: 0
PHOTOPHOBIA: 0
WEIGHT LOSS: 0
COUGH: 0
DOUBLE VISION: 0
SHORTNESS OF BREATH: 0
MYALGIAS: 0
HEADACHES: 0

## 2020-08-05 ASSESSMENT — LIFESTYLE VARIABLES: SUBSTANCE_ABUSE: 0

## 2020-08-05 NOTE — CARE PLAN
Problem: Communication  Goal: The ability to communicate needs accurately and effectively will improve  Outcome: PROGRESSING AS EXPECTED  Intervention: [place] patient and significant other/support system to call light to alert staff of needs  Flowsheets (Taken 8/4/2020 2145)  Oriented to:: All of the Following : Location of Bathroom, Visiting Policy, Unit Routine, Call Light and Bedside Controls, Bedside Rail Policy, Smoking Policy, Rights and Responsibilities, Bedside Report, and Patient Education Notebook     Problem: Safety  Goal: Will remain free from falls  Outcome: PROGRESSING AS EXPECTED  Intervention: Implement fall precautions  Flowsheets (Taken 8/4/2020 2145)  Environmental Precautions:   Treaded Slipper Socks on Patient   Personal Belongings, Wastebasket, Call Bell etc. in Easy Reach   Transferred to Stronger Side   Report Given to Other Health Care Providers Regarding Fall Risk   Bed in Low Position   Communication Sign for Patients & Families   Mobility Assessed & Appropriate Sign Placed  Chair/Bed Strip Alarm: Yes - Alarm On

## 2020-08-05 NOTE — PROGRESS NOTES
"Hospital Medicine Daily Progress Note    Date of Service  8/5/2020    Chief Complaint  69 y.o. male admitted 7/17/2020 with SOB    Hospital Course  As per Dr. Sanderson note      \"69 y.o. male who presented 7/17/2020 with acute hypoxic respiratory failure.  He has a previous medical history that includes congestive heart failure, right eye enucleation following trauma, previous stroke, history of MRSA infection, diabetes, COPD, hypertension, coronary artery disease, DVT, hypothyroidism.  He originally presented to [facility] in [facility] and was admitted for possible aspiration pneumonitis.  He was transferred to us after approximately week at that facility due to worsening condition.  On arrival here the patient was on 15 L nonrebreather and satting in the low 90s.  His COVID was negative, subsequent cultures from the sputum grew out gram-positive cocci.  He was treated with 7 days of Zosyn which was completed on July 24.  He required intubation and bronchoscopy on July 21.  During that procedure he was found to have large amount of purulent secretions in the right mainstem and right lower lobe.  The patient went on to self extubate himself on July 22.  At that point he was made DNR however intubation okay after discussion with family.  Patient was transferred out of the ICU on July 29.  His ICU stay was also complicated by small bowel obstruction.  He was treated nonoperatively\".         Interval Problem Update    I evaluated and examined him at the bedside.  He denies any symptoms of pain and shortness of breath.  He is a still requiring high flow oxygen currently on 60 L.  I encouraged him to use incentive spirometry.  I discussed plan of care with pulmonology.  Continue diuresis with IV Lasix.      Consultants/Specialty  Pulmonary    Code Status  DNR/I    Disposition  ?LTACH: referal placed    Review of Systems  Review of Systems   Constitutional: Negative for chills, fever and weight loss. "   HENT: Negative for hearing loss and tinnitus.    Eyes: Negative for blurred vision, double vision, photophobia and pain.   Respiratory: Negative for cough, sputum production and shortness of breath.    Cardiovascular: Negative for chest pain, palpitations, orthopnea and leg swelling.   Gastrointestinal: Negative for abdominal pain, constipation, diarrhea, nausea and vomiting.   Genitourinary: Negative for dysuria, frequency and urgency.   Musculoskeletal: Negative for back pain, joint pain, myalgias and neck pain.   Skin: Negative for rash.   Neurological: Negative for dizziness, tingling, tremors, sensory change, speech change, focal weakness and headaches.   Psychiatric/Behavioral: Negative for hallucinations and substance abuse.   All other systems reviewed and are negative.       Physical Exam  Temp:  [36.5 °C (97.7 °F)-36.9 °C (98.4 °F)] 36.9 °C (98.4 °F)  Pulse:  [59-87] 80  Resp:  [14-20] 20  BP: (102-112)/(56-64) 111/64  SpO2:  [90 %-94 %] 92 %    Physical Exam  Vitals signs reviewed.   Constitutional:       General: He is not in acute distress.  HENT:      Head: Normocephalic and atraumatic. No contusion.      Right Ear: External ear normal.      Left Ear: External ear normal.      Nose: Nose normal.      Mouth/Throat:      Mouth: Mucous membranes are dry.      Pharynx: No oropharyngeal exudate.   Eyes:      General:         Right eye: No discharge.         Left eye: No discharge.      Pupils: Pupils are equal, round, and reactive to light.      Comments: Chronic eye changes   Neck:      Musculoskeletal: No neck rigidity or muscular tenderness.   Cardiovascular:      Rate and Rhythm: Normal rate and regular rhythm.      Heart sounds: Murmur present. No friction rub. No gallop.    Pulmonary:      Effort: Pulmonary effort is normal.      Breath sounds: Rales present. No wheezing or rhonchi.   Abdominal:      General: Bowel sounds are normal. There is no distension.      Palpations: Abdomen is soft.       Tenderness: There is no abdominal tenderness. There is no rebound.   Musculoskeletal: Normal range of motion.         General: No swelling or tenderness.   Skin:     General: Skin is warm and dry.      Coloration: Skin is not jaundiced.   Neurological:      General: No focal deficit present.      Mental Status: He is alert.      Cranial Nerves: No cranial nerve deficit.      Sensory: No sensory deficit.   Psychiatric:         Mood and Affect: Mood normal.       I performed physical exam on August 5, 2020 and he remains similar without any acute changes.  Fluids    Intake/Output Summary (Last 24 hours) at 8/5/2020 1550  Last data filed at 8/5/2020 1000  Gross per 24 hour   Intake 510 ml   Output 1150 ml   Net -640 ml       Laboratory  Recent Labs     08/05/20  0911   WBC 11.7*   RBC 4.69*   HEMOGLOBIN 13.7*   HEMATOCRIT 43.5   MCV 92.8   MCH 29.2   MCHC 31.5*   RDW 46.1   PLATELETCT 299   MPV 12.1     Recent Labs     08/03/20  0339 08/04/20  0854 08/05/20  0911   SODIUM 136 132* 136   POTASSIUM 4.1 4.1 3.9   CHLORIDE 96 93* 94*   CO2 26 30 30   GLUCOSE 106* 97 94   BUN 31* 29* 27*   CREATININE 0.99 0.90 0.86   CALCIUM 10.1 9.9 10.0                   Imaging  DX-CHEST-PORTABLE (1 VIEW)   Final Result         No significant interval change.      Low lung volumes with hypoventilatory change and bibasilar opacities.      DX-CHEST-PORTABLE (1 VIEW)   Final Result         1.  Pulmonary edema and/or infiltrates are identified, which are stable since the prior exam. Trace right pleural effusion            DX-CHEST-PORTABLE (1 VIEW)   Final Result         1.  Pulmonary edema and/or infiltrates are identified, which are stable since the prior exam. Trace right pleural effusion         EC-ECHOCARDIOGRAM COMPLETE W/ CONT   Final Result      IN-SAPLFGN-3 VIEW   Final Result      1.  Mildly prominent small bowel in LEFT upper quadrant, of uncertain significance.  No definite bowel obstruction.   2.  Increased colonic stool  suggesting constipation.   3.  Supportive tubing as described above.      DX-CHEST-PORTABLE (1 VIEW)   Final Result         1.  Pulmonary edema and/or infiltrates are identified, which are stable since the prior exam.   2.  Right midlung pulmonary nodule, visible on prior CT yesterday, see CT for further characterization and recommendations.      CT-CTA CHEST PULMONARY ARTERY W/ RECONS   Final Result      1.  No CT evidence for pulmonary emboli.   2.  RIGHT lower lobe atelectasis with bronchial occlusion.   3.  Small LEFT pleural effusion with associated atelectasis.   4.  Numerous ill-defined nodular densities in both lungs which may be inflammatory or neoplastic.  Septic emboli is a consideration.   5.  Supportive tubing as described above.               DX-CHEST-PORTABLE (1 VIEW)   Final Result         1.  Pulmonary edema and/or infiltrates are identified, which are stable since the prior exam.      DX-ABDOMEN FOR TUBE PLACEMENT   Final Result         Feeding tube with tip projecting over the expected area of the stomach fundus.      DX-CHEST-PORTABLE (1 VIEW)   Final Result         1.  Pulmonary edema and/or infiltrates are identified, which are stable since the prior exam.   2.  Small left pleural effusion      DX-CHEST-PORTABLE (1 VIEW)   Final Result         1.  Pulmonary edema and/or infiltrates are identified, which are stable since the prior exam.      DX-CHEST-PORTABLE (1 VIEW)   Final Result      1.  Satisfactory appearance of the ET tube and right IJ catheter.   2.  There is no visible pneumothorax.   3.  Stable perihilar and lower lobe opacities likely due to atelectasis and/or edema.      DX-CHEST-LIMITED (1 VIEW)   Final Result         No significant interval change.      GD-PCRGRRE-6 VIEW   Final Result      NG tube tip overlies the gastric body.      OUTSIDE IMAGES-CT ABDOMEN /PELVIS   Final Result      OUTSIDE IMAGES-DX CHEST   Final Result      OUTSIDE IMAGES-DX ABDOMEN   Final Result       OUTSIDE IMAGES-CT THORACIC SPINE   Final Result      DX-CHEST-LIMITED (1 VIEW)   Final Result         1.  Pulmonary edema and/or infiltrates.      US-EXTREMITY VENOUS LOWER BILAT    (Results Pending)        Assessment/Plan  * Acute respiratory failure with hypoxia (HCC)  Assessment & Plan  Extubated 7/22  He is still requiring high flow oxygen.  Secretions have improved last 48hrs per RT  Cont O2/RT protocols  Pulmonology is following  Follow volume status closely: no improvement with diuresis over last 48hrs  Mobilize  I encourage him to use incentive spirometry.  I discussed case with pulmonology today.  Continue diuresis.      Aspiration pneumonia (HCC)  Assessment & Plan  Has completed 7 days of zosyn  1:1 feeding with RN staff  Aspiration precautions.    KORIN (acute kidney injury) ([facility])  Assessment & Plan  Now resolved  Continue to monitor with BMP.    SBO (small bowel obstruction) ([facility])  Assessment & Plan  Treated conservatively and resolved  Currently he is tolerating p.o. diet.  Abdominal exam did not show any signs of acute abdomen.  Continue bowel protocol.      Hypernatremia  Assessment & Plan  Resolved  Continue to monitor.    Goals of care, counseling/discussion  Assessment & Plan  At present pt is DNR/I  DW son 8/1     I discussed plan of care during multidisciplinary rounds.    I discussed plan of care with pulmonology.      VTE prophylaxis: enoxaparin

## 2020-08-05 NOTE — PROGRESS NOTES
Bedside report received. Patient A&Ox 2. No complaints of pain at this time, resting comfortably in bed. No events over night.  Call light and personal belongings in reach. Bed locked and in lowest position. Alarm and fall precautions in place.

## 2020-08-05 NOTE — DISCHARGE PLANNING
Anticipated Discharge Disposition: LTAC    Action: RN AMBER spoke with Ese at [facility] and they do not have any beds right now, the soonest may not be until weekend or after.   RN CM called patient's son, Taurus, and discussed sending a referral to [facility] to see if they would have a bed sooner or not. Taurus was agreeable. Choice faxed to [facility].  Taurus would like to be contacted once a bed is available at either facility.    Barriers to Discharge: bed availability at LTACH    Plan: Case coordination to f/u with referral

## 2020-08-05 NOTE — DISCHARGE PLANNING
Received Choice form at [phone]  Agency/Facility Name: [facility]  Referral sent per Choice form @ [phone]

## 2020-08-05 NOTE — PROGRESS NOTES
Daily Progress Note: Pulmonary     Date of Service: 8/5/2020  Attending:    Senior Resident:     Chief Complaint:   Acute hypoxic hypercapnic respiratory failure    Subjective: Pt sitting up in bed on high flow oxygen 40LPM FiO2 60% sating 88-92% using the IS and is able to get it to about 2000. Denies worsening SOB, CP, cough, hemoptysis, fevers, and chills. Advised him to continue using the IS frequently and to mobilize as much as possible.     Interval Update:  Diuresing well, O2 requirement at 40LPM 60% FiO2 on high flow, which would be about 10 L on oxy mask  Goal O2 88-92%  Continue diuresis to improve pulmonary edema and aggressive IS and mobilization to improve atelectasis, which could explain the low Pa2:FiO2 ratio <100, but may have another component like distal PE. Will order DVT LE study and continue lovenox and SCDs for DVT prophylaxis. Pt is max assist, nursing will work on getting pt up in the chair. Will also have RT provide a flutter valve to help with phlegm excretion.     Review of Systems:   Respiratory: Positive for shortness of breath (stable). Negative for cough, sputum production and stridor.    Cardiovascular: Negative for chest pain, palpitations, claudication and leg swelling.    Objective Data:   Physical Exam:   Vitals:   Temp:  [36.3 °C (97.4 °F)-36.8 °C (98.2 °F)] 36.6 °C (97.9 °F)  Pulse:  [59-87] 62  Resp:  [14-18] 18  BP: (102-113)/(56-64) 102/58  SpO2:  [90 %-98 %] 90 %    Physical Exam  Constitutional:       General: He is not in acute distress.     Appearance: Normal appearance. He is normal weight. He is not ill-appearing, toxic-appearing or diaphoretic.   HENT:      Head: Normocephalic and atraumatic.   Eyes:      Extraocular Movements: Extraocular movements intact.   Cardiovascular:      Rate and Rhythm: Normal rate and regular rhythm.      Heart sounds: Normal heart sounds.   Pulmonary:      Effort: No respiratory distress.      Breath sounds: No stridor.  No wheezing, rhonchi or rales.      Comments: Diminished breath sounds right lower base   Musculoskeletal:      Right lower leg: No edema.      Left lower leg: No edema.   Neurological:      Mental Status: He is alert.       Labs:   Recent Labs     08/03/20  0339 08/04/20  0854   SODIUM 136 132*   POTASSIUM 4.1 4.1   CHLORIDE 96 93*   CO2 26 30   GLUCOSE 106* 97   BUN 31* 29*     Recent Labs     08/05/20  0911   WBC 11.7*   RBC 4.69*   HEMOGLOBIN 13.7*   HEMATOCRIT 43.5   MCV 92.8   MCH 29.2   RDW 46.1   PLATELETCT 299   MPV 12.1       Imaging:   DX-CHEST-PORTABLE (1 VIEW)   Final Result         No significant interval change.      Low lung volumes with hypoventilatory change and bibasilar opacities.      DX-CHEST-PORTABLE (1 VIEW)   Final Result         1.  Pulmonary edema and/or infiltrates are identified, which are stable since the prior exam. Trace right pleural effusion            DX-CHEST-PORTABLE (1 VIEW)   Final Result         1.  Pulmonary edema and/or infiltrates are identified, which are stable since the prior exam. Trace right pleural effusion         EC-ECHOCARDIOGRAM COMPLETE W/ CONT   Final Result      WU-[id number]-1 VIEW   Final Result      1.  Mildly prominent small bowel in LEFT upper quadrant, of uncertain significance.  No definite bowel obstruction.   2.  Increased colonic stool suggesting constipation.   3.  Supportive tubing as described above.      DX-CHEST-PORTABLE (1 VIEW)   Final Result         1.  Pulmonary edema and/or infiltrates are identified, which are stable since the prior exam.   2.  Right midlung pulmonary nodule, visible on prior CT yesterday, see CT for further characterization and recommendations.      CT-CTA CHEST PULMONARY ARTERY W/ RECONS   Final Result      1.  No CT evidence for pulmonary emboli.   2.  RIGHT lower lobe atelectasis with bronchial occlusion.   3.  Small LEFT pleural effusion with associated atelectasis.   4.  Numerous ill-defined nodular densities in both  lungs which may be inflammatory or neoplastic.  Septic emboli is a consideration.   5.  Supportive tubing as described above.               DX-CHEST-PORTABLE (1 VIEW)   Final Result         1.  Pulmonary edema and/or infiltrates are identified, which are stable since the prior exam.      DX-ABDOMEN FOR TUBE PLACEMENT   Final Result         Feeding tube with tip projecting over the expected area of the stomach fundus.      DX-CHEST-PORTABLE (1 VIEW)   Final Result         1.  Pulmonary edema and/or infiltrates are identified, which are stable since the prior exam.   2.  Small left pleural effusion      DX-CHEST-PORTABLE (1 VIEW)   Final Result         1.  Pulmonary edema and/or infiltrates are identified, which are stable since the prior exam.      DX-CHEST-PORTABLE (1 VIEW)   Final Result      1.  Satisfactory appearance of the ET tube and right IJ catheter.   2.  There is no visible pneumothorax.   3.  Stable perihilar and lower lobe opacities likely due to atelectasis and/or edema.      DX-CHEST-LIMITED (1 VIEW)   Final Result         No significant interval change.      [id number]-2 VIEW   Final Result      NG tube tip overlies the gastric body.      OUTSIDE IMAGES-CT ABDOMEN /PELVIS   Final Result      OUTSIDE IMAGES-DX CHEST   Final Result      OUTSIDE IMAGES-DX ABDOMEN   Final Result      OUTSIDE IMAGES-CT THORACIC SPINE   Final Result      DX-CHEST-LIMITED (1 VIEW)   Final Result         1.  Pulmonary edema and/or infiltrates.        Problem Representation:      Acute hypoxic respiratory failure with chronic hypercapnia      * Acute respiratory failure with hypoxia and chronic hypercapnia (HCC)  Assessment & Plan  Due to aspiration pneumonia/pneumonitis and likely chronic hypercapnia from COPD compensated and normal pH   CTA from OSH per report no PE, BB ATX versus infiltrate  Component of atelectasis on x-ray as well       CT with dense RLL consolidation s/p PNA treatment -7 days Zosyn-bleed 7/24        Pa2:FiO2 ratio is low may be shunting from atelectasis and possibly distal PE not seen on prior CT     7/18 COVID SARS CoV2 PCR negative  7/18 Sputum Cx gram stain GPC, Cx pending  7/20 started on scopolamine patch due to increased oral secretions  7/21 intubated, bronched - large amount purulent secretions  7/22 self-extubated  Since he has been on HFNC, actually decreased requirements and clinically appears comfortable      Plan;   Continue HFNC, keep sat 88-92% try to wean FiO2 requirements down (now down to FiO2 60 with O2 88-92%)  Goal O2 88-92%   Continue diuresis to improve pulmonary edema and aggressive IS and mobilization to improve atelectasis, which could explain the low Pa2:FiO2 ratio <100, but may have another component like distal PE. Will order DVT LE study and continue lovenox and SCDs for DVT prophylaxis. Pt is max assist, nursing will work on getting pt up in the chair. Will also have RT provide a flutter valve to help with phlegm excretion. Continue mucolytic's.Transition to oxygen mask and then nasal cannula when clinically appropriate     Aspiration pneumonia (HCC)  Assessment & Plan  Status post 7 days antibiotics-Zosyn-completed 7/24  Imaging revealed dense pneumonic opacities right lung/ATX  Aggressive pulmonary toilet as tolerated with IS and frequent ambulation   Nebulized treatments       KORIN (acute kidney injury) (HCC)  Assessment & Plan  Creatinine normalized, continue serial BMP, normalized with time/hydration  CT abdomen pelvis from OSH did not mention hydronephrosis or obstruction  Monitor urine output  Avoid nephrotoxins     Goals of care, counseling/discussion  Assessment & Plan  Palliative care following  Son Taurus describes his dad typically avoid health care and wouldn't any of this current level of care current getting family to come in and say goodbye vs trying to get back to [facility] and then plan towards focusing on his comfort   DNR/DNI  If he was to worsen Taurus would like  to transitioning to comfort measures  Family conference 7/28 father and son  SANTO completed/signed

## 2020-08-05 NOTE — DISCHARGE PLANNING
Agency/Facility Name: [facility]   Outcome: Voice mail left regarding referral. Requested a call back.

## 2020-08-06 LAB
ANION GAP SERPL CALC-SCNC: 13 MMOL/L (ref 7–16)
BUN SERPL-MCNC: 30 MG/DL (ref 8–22)
CALCIUM SERPL-MCNC: 10.1 MG/DL (ref 8.5–10.5)
CHLORIDE SERPL-SCNC: 93 MMOL/L (ref 96–112)
CO2 SERPL-SCNC: 31 MMOL/L (ref 20–33)
CREAT SERPL-MCNC: 0.97 MG/DL (ref 0.5–1.4)
ERYTHROCYTE [DISTWIDTH] IN BLOOD BY AUTOMATED COUNT: 45.4 FL (ref 35.9–50)
GLUCOSE SERPL-MCNC: 90 MG/DL (ref 65–99)
HCT VFR BLD AUTO: 39.8 % (ref 42–52)
HGB BLD-MCNC: 12.7 G/DL (ref 14–18)
MAGNESIUM SERPL-MCNC: 2 MG/DL (ref 1.5–2.5)
MCH RBC QN AUTO: 29.2 PG (ref 27–33)
MCHC RBC AUTO-ENTMCNC: 31.9 G/DL (ref 33.7–35.3)
MCV RBC AUTO: 91.5 FL (ref 81.4–97.8)
PLATELET # BLD AUTO: 319 K/UL (ref 164–446)
PMV BLD AUTO: 11.6 FL (ref 9–12.9)
POTASSIUM SERPL-SCNC: 4 MMOL/L (ref 3.6–5.5)
RBC # BLD AUTO: 4.35 M/UL (ref 4.7–6.1)
SODIUM SERPL-SCNC: 137 MMOL/L (ref 135–145)
WBC # BLD AUTO: 11.9 K/UL (ref 4.8–10.8)

## 2020-08-06 PROCEDURE — 700111 HCHG RX REV CODE 636 W/ 250 OVERRIDE (IP): Performed by: HOSPITALIST

## 2020-08-06 PROCEDURE — 99232 SBSQ HOSP IP/OBS MODERATE 35: CPT | Performed by: INTERNAL MEDICINE

## 2020-08-06 PROCEDURE — A9270 NON-COVERED ITEM OR SERVICE: HCPCS | Performed by: INTERNAL MEDICINE

## 2020-08-06 PROCEDURE — 700111 HCHG RX REV CODE 636 W/ 250 OVERRIDE (IP): Performed by: STUDENT IN AN ORGANIZED HEALTH CARE EDUCATION/TRAINING PROGRAM

## 2020-08-06 PROCEDURE — 94640 AIRWAY INHALATION TREATMENT: CPT

## 2020-08-06 PROCEDURE — 80048 BASIC METABOLIC PNL TOTAL CA: CPT

## 2020-08-06 PROCEDURE — 36415 COLL VENOUS BLD VENIPUNCTURE: CPT

## 2020-08-06 PROCEDURE — 85027 COMPLETE CBC AUTOMATED: CPT

## 2020-08-06 PROCEDURE — 97535 SELF CARE MNGMENT TRAINING: CPT

## 2020-08-06 PROCEDURE — 94760 N-INVAS EAR/PLS OXIMETRY 1: CPT

## 2020-08-06 PROCEDURE — 83735 ASSAY OF MAGNESIUM: CPT

## 2020-08-06 PROCEDURE — 700102 HCHG RX REV CODE 250 W/ 637 OVERRIDE(OP): Performed by: INTERNAL MEDICINE

## 2020-08-06 PROCEDURE — 770020 HCHG ROOM/CARE - TELE (206)

## 2020-08-06 PROCEDURE — 99232 SBSQ HOSP IP/OBS MODERATE 35: CPT | Mod: GC | Performed by: INTERNAL MEDICINE

## 2020-08-06 PROCEDURE — 700111 HCHG RX REV CODE 636 W/ 250 OVERRIDE (IP): Performed by: INTERNAL MEDICINE

## 2020-08-06 PROCEDURE — 97530 THERAPEUTIC ACTIVITIES: CPT

## 2020-08-06 RX ADMIN — BUDESONIDE 0.5 MG: 0.5 SUSPENSION RESPIRATORY (INHALATION) at 06:08

## 2020-08-06 RX ADMIN — FUROSEMIDE 20 MG: 10 INJECTION, SOLUTION INTRAMUSCULAR; INTRAVENOUS at 04:40

## 2020-08-06 RX ADMIN — BUDESONIDE 0.5 MG: 0.5 SUSPENSION RESPIRATORY (INHALATION) at 18:20

## 2020-08-06 RX ADMIN — FUROSEMIDE 20 MG: 10 INJECTION, SOLUTION INTRAMUSCULAR; INTRAVENOUS at 17:25

## 2020-08-06 RX ADMIN — GUAIFENESIN 200 MG: 100 SOLUTION ORAL at 14:12

## 2020-08-06 RX ADMIN — ENOXAPARIN SODIUM 40 MG: 40 INJECTION SUBCUTANEOUS at 04:40

## 2020-08-06 RX ADMIN — DOCUSATE SODIUM 50 MG AND SENNOSIDES 8.6 MG 2 TABLET: 8.6; 5 TABLET, FILM COATED ORAL at 04:40

## 2020-08-06 ASSESSMENT — COGNITIVE AND FUNCTIONAL STATUS - GENERAL
TURNING FROM BACK TO SIDE WHILE IN FLAT BAD: A LOT
WALKING IN HOSPITAL ROOM: A LOT
CLIMB 3 TO 5 STEPS WITH RAILING: TOTAL
SUGGESTED CMS G CODE MODIFIER DAILY ACTIVITY: CL
DRESSING REGULAR LOWER BODY CLOTHING: TOTAL
HELP NEEDED FOR BATHING: TOTAL
SUGGESTED CMS G CODE MODIFIER MOBILITY: CL
TOILETING: TOTAL
MOVING TO AND FROM BED TO CHAIR: A LOT
DRESSING REGULAR UPPER BODY CLOTHING: A LOT
STANDING UP FROM CHAIR USING ARMS: A LOT
EATING MEALS: A LOT
MOVING FROM LYING ON BACK TO SITTING ON SIDE OF FLAT BED: UNABLE
DAILY ACTIVITIY SCORE: 10
PERSONAL GROOMING: A LITTLE
MOBILITY SCORE: 10

## 2020-08-06 ASSESSMENT — ENCOUNTER SYMPTOMS
SPEECH CHANGE: 0
MYALGIAS: 0
EYE PAIN: 0
WEIGHT LOSS: 0
DIARRHEA: 0
NECK PAIN: 0
CONSTIPATION: 0
HEADACHES: 0
TINGLING: 0
SENSORY CHANGE: 0
BLURRED VISION: 0
HALLUCINATIONS: 0
FOCAL WEAKNESS: 0
CHILLS: 0
SPUTUM PRODUCTION: 0
VOMITING: 0
DOUBLE VISION: 0
TREMORS: 0
NAUSEA: 0
COUGH: 0
ORTHOPNEA: 0
ABDOMINAL PAIN: 0
PALPITATIONS: 0
FEVER: 0
PHOTOPHOBIA: 0
SHORTNESS OF BREATH: 0
DIZZINESS: 0
BACK PAIN: 0

## 2020-08-06 ASSESSMENT — LIFESTYLE VARIABLES: SUBSTANCE_ABUSE: 0

## 2020-08-06 NOTE — THERAPY
"Occupational Therapy  Daily Treatment     Patient Name: Johann Godoy  Age:  69 y.o., Sex:  male  Medical Record #: [id number]  Today's Date: 8/6/2020     Precautions  Precautions: Fall Risk, Swallow Precautions ( See Comments)  Comments: HFNC    Assessment    Pt seen for OT tx session. Pt appeared to be primarily limited by what appeared to be anxiety Pt w/ poor attention to tasks and required cues for relaxation when standing as he would have increased SOB. Pt demo'd impaired balance, functional mobility and activity tolerance.     Plan    Continue current treatment plan.    DC Equipment Recommendations: (P) Unable to determine at this time  Discharge Recommendations: (P) Recommend post-acute placement for additional occupational therapy services prior to discharge home    Subjective    \"I can't wash my mouth, I can't see it\"     Objective       08/06/20 1437   Pain 0 - 10 Group   Therapist Pain Assessment Post Activity Pain Same as Prior to Activity;Nurse Notified  (no c/o pain during session)   Cognition    Speech/ Communication Delayed Responses;Hard of Hearing   Level of Consciousness Alert   Ability To Follow Commands 1 Step   New Learning Impaired   Attention Impaired   Comments Delayed responses, cooperative, appeared anxious    Balance   Sitting Balance (Static) Fair   Sitting Balance (Dynamic) Fair -   Standing Balance (Static) Poor +   Standing Balance (Dynamic) Poor +   Weight Shift Sitting Fair   Weight Shift Standing Poor   Skilled Intervention Verbal Cuing;Tactile Cuing;Facilitation   Comments w/ B UE support   Bed Mobility    Supine to Sit Minimal Assist   Sit to Supine Minimal Assist   Activities of Daily Living   Grooming Minimal Assist;Seated  (oral care w/ suction underpants )   Lower Body Dressing Total Assist   Toileting Total Assist  (incont of stool)   Functional Mobility   Sit to Stand Moderate Assist   Bed, Chair, Wheelchair Transfer Moderate Assist  (x2 for safety )   Mobility EOB, marching " in place, lateral steps   Skilled Intervention Tactile Cuing;Verbal Cuing   Activity Tolerance   Sitting in Chair NT   Sitting Edge of Bed 15 min   Standing 4 min total   Patient / Family Goals   Patient / Family Goal #1 To get stronger   Goal #1 Outcome Progressing as expected   Short Term Goals   Short Term Goal # 1 Pt will complete ADL xfer supv   Goal Outcome # 1 Goal not met   Short Term Goal # 2 Pt will complete seated G/H supv   Goal Outcome # 2 Goal not met   Short Term Goal # 3 Pt will complete LB dressing supv   Goal Outcome # 3 Goal not met

## 2020-08-06 NOTE — THERAPY
Physical Therapy   Daily Treatment     Patient Name: Johann Godoy  Age:  69 y.o., Sex:  male  Medical Record #: [id number]  Today's Date: 8/6/2020     Precautions: Fall Risk, Swallow Precautions ( See Comments)    Assessment    Pt progressing as expected given co-morbidities and recently limited mobility. He essentially requires min/mod A for limited mobility and pre-gait activity. Noted pt appears quite anxious with mobility and needs consistent encouragement and appears to do best with simple direct/simple commands as pt becomes more anxious/fearful with increasing stimulus. Will continue to visit with recs/details below.     Plan    Continue current treatment plan.    DC Equipment Recommendations: (P) Unable to determine at this time  Discharge Recommendations: (P) Recommend post-acute placement for additional physical therapy services prior to discharge home         08/06/20 1439   Cognition    Cognition / Consciousness X   Speech/ Communication Delayed Responses;Hard of Hearing   Level of Consciousness Alert   Ability To Follow Commands 1 Step   New Learning Impaired   Attention Impaired   Comments delayed respsones, appears quite anxious throughout; this is likely in part baseilne behavior as well   Neuro-Muscular Treatments   Neuro-Muscular Treatments Compensatory Strategies;Postural Facilitation;Tactile Cuing;Sequencing;Weight Shift Right;Weight Shift Left;Verbal Cuing   Comments with standing/pre-gait activities   Balance   Sitting Balance (Static) Fair   Sitting Balance (Dynamic) Fair -   Standing Balance (Static) Poor +   Standing Balance (Dynamic) Poor +   Weight Shift Sitting Fair   Weight Shift Standing Poor   Skilled Intervention Verbal Cuing;Compensatory Strategies;Facilitation   Comments no jorden LOB though does need consistent cueing/facilitation for balance/compensatory strategies    Gait Analysis   Skilled Intervention Verbal Cuing;Compensatory Strategies   Comments for  "pre-gait/marching/sidestepping; needed cueing/encouragement    Bed Mobility    Supine to Sit Minimal Assist   Sit to Supine Minimal Assist   Scooting Moderate Assist   Skilled Intervention Compensatory Strategies;Facilitation;Tactile Cuing;Verbal Cuing   Functional Mobility   Sit to Stand Moderate Assist   Bed, Chair, Wheelchair Transfer Moderate Assist   Transfer Method Other (Comments)  (stand step/sidestepping)   Mobility bed mobility, EOB, sit<>stands, sidestepping, attempt to initiate stepping   Skilled Intervention Verbal Cuing;Tactile Cuing;Sequencing;Postural Facilitation;Compensatory Strategies;Facilitation   Comments noted anxiety limiting to some degree; pt consisent reports \"i can't\" then demonstrates ability to peform; quite anxious with mobility   Activity Tolerance   Sitting in Chair NT   Sitting Edge of Bed at least 15 mins   Standing ~3-4 mins total   Comments increased WOB/LEVINE with activity; in part 2' to anxiety   Short Term Goals    Short Term Goal # 1 Pt will perform bed mobility with HOB and rails as needed with supervision in 6 visits.    Goal Outcome # 1 goal not met   Short Term Goal # 2 Pt will transfer with min A in 6 visits from bed to bs chair to improve functional indep.    Goal Outcome # 2 Goal not met   Short Term Goal # 3 Pt will ambulate x 25 feet using FWW with Fito in 6 visits to improve functional indep.    Goal Outcome # 3 Goal not met         "

## 2020-08-06 NOTE — CARE PLAN
Problem: Communication  Goal: The ability to communicate needs accurately and effectively will improve  Outcome: PROGRESSING AS EXPECTED  Note: Pt's whiteboard is updated, pt has been updated on POC, all questions have been answered at this time       Problem: Safety  Goal: Will remain free from falls  Outcome: PROGRESSING AS EXPECTED  Note: Bed locked in lowest position. Bed alarm on. Treaded socks in use. Call light and belongings within reach. Pt educated to call for assistance. Pt verbalized understanding. Hourly rounding in place.

## 2020-08-06 NOTE — DISCHARGE PLANNING
Anticipated Discharge Disposition: [facility]    Action: RN AMBER spoke with Erin at [facility] and her supervisor is doing final review of referral. She will call this RN AMBER back this afternoon.    Barriers to Discharge: acceptance at [facility]ACH, bed availability    Plan: Case coordination to f/u with [facility] regarding referral

## 2020-08-06 NOTE — PROGRESS NOTES
Daily Progress Note: Pulmonary     Date of Service: 8/6/2020  Attending:    Senior Resident:     Chief Complaint:   Acute hypoxic hypercapnic respiratory failure    Subjective: Pt sitting up in bed on high flow oxygen 40LPM FiO2 60% sating 88-92%. Denies worsening SOB, CP, cough, hemoptysis, fevers, and chills. Advised him to continue using the IS frequently and to mobilize as much as possible.      Interval Update:  Diuresing well, O2 requirement at 40LPM 60% FiO2 on high flow, which would be about 10 L on oxy mask  Goal O2 88-92%  Continue diuresis to improve pulmonary edema and aggressive IS and mobilization to improve atelectasis, which could explain the low Pa2:FiO2 ratio <100, but may have another component like distal PE. Will order DVT LE study and continue lovenox and SCDs for DVT prophylaxis. Pt is max assist, nursing will work on getting pt up in the chair. Will also have RT provide a flutter valve to help with phlegm excretion.   Bilateral lower extremity US negative for DVT  RT to attempt weaning down FiO2 and consider trying patient on an oxy mask     Review of Systems:   Respiratory: Positive for shortness of breath (stable). Negative for cough, sputum production and stridor.    Cardiovascular: Negative for chest pain, palpitations, claudication and leg swelling.    Objective Data:   Physical Exam:   Vitals:   Temp:  [36.4 °C (97.5 °F)-36.8 °C (98.2 °F)] 36.5 °C (97.7 °F)  Pulse:  [62-80] 66  Resp:  [16-18] 16  BP: (105-123)/(57-69) 123/64  SpO2:  [89 %-95 %] 91 %    Physical Exam  Constitutional:       General: He is not in acute distress.     Appearance: Normal appearance. He is normal weight. He is not ill-appearing, toxic-appearing or diaphoretic.   HENT:      Head: Normocephalic and atraumatic.   Eyes:      Extraocular Movements: Extraocular movements intact.   Cardiovascular:      Rate and Rhythm: Normal rate and regular rhythm.      Heart sounds: Normal heart sounds.    Pulmonary:      Effort: No respiratory distress.      Breath sounds: No stridor. No wheezing, rhonchi or rales.      Comments: Diminished breath sounds right lower base   Musculoskeletal:      Right lower leg: No edema.      Left lower leg: No edema.   Neurological:      Mental Status: He is alert.       Labs:   Recent Labs     08/04/20  0854 08/05/20  0911 08/06/20  0304   SODIUM 132* 136 137   POTASSIUM 4.1 3.9 4.0   CHLORIDE 93* 94* 93*   CO2 30 30 31   GLUCOSE 97 94 90   BUN 29* 27* 30*     Recent Labs     08/05/20  0911 08/06/20  0304   WBC 11.7* 11.9*   RBC 4.69* 4.35*   HEMOGLOBIN 13.7* 12.7*   HEMATOCRIT 43.5 39.8*   MCV 92.8 91.5   MCH 29.2 29.2   RDW 46.1 45.4   PLATELETCT 299 319   MPV 12.1 11.6       Imaging:   US-EXTREMITY VENOUS LOWER BILAT   Final Result      DX-CHEST-PORTABLE (1 VIEW)   Final Result         No significant interval change.      Low lung volumes with hypoventilatory change and bibasilar opacities.      DX-CHEST-PORTABLE (1 VIEW)   Final Result         1.  Pulmonary edema and/or infiltrates are identified, which are stable since the prior exam. Trace right pleural effusion            DX-CHEST-PORTABLE (1 VIEW)   Final Result         1.  Pulmonary edema and/or infiltrates are identified, which are stable since the prior exam. Trace right pleural effusion         EC-ECHOCARDIOGRAM COMPLETE W/ CONT   Final Result      PF-KXXOSOL-0 VIEW   Final Result      1.  Mildly prominent small bowel in LEFT upper quadrant, of uncertain significance.  No definite bowel obstruction.   2.  Increased colonic stool suggesting constipation.   3.  Supportive tubing as described above.      DX-CHEST-PORTABLE (1 VIEW)   Final Result         1.  Pulmonary edema and/or infiltrates are identified, which are stable since the prior exam.   2.  Right midlung pulmonary nodule, visible on prior CT yesterday, see CT for further characterization and recommendations.      CT-CTA CHEST PULMONARY ARTERY W/ RECONS   Final  Result      1.  No CT evidence for pulmonary emboli.   2.  RIGHT lower lobe atelectasis with bronchial occlusion.   3.  Small LEFT pleural effusion with associated atelectasis.   4.  Numerous ill-defined nodular densities in both lungs which may be inflammatory or neoplastic.  Septic emboli is a consideration.   5.  Supportive tubing as described above.               DX-CHEST-PORTABLE (1 VIEW)   Final Result         1.  Pulmonary edema and/or infiltrates are identified, which are stable since the prior exam.      DX-ABDOMEN FOR TUBE PLACEMENT   Final Result         Feeding tube with tip projecting over the expected area of the stomach fundus.      DX-CHEST-PORTABLE (1 VIEW)   Final Result         1.  Pulmonary edema and/or infiltrates are identified, which are stable since the prior exam.   2.  Small left pleural effusion      DX-CHEST-PORTABLE (1 VIEW)   Final Result         1.  Pulmonary edema and/or infiltrates are identified, which are stable since the prior exam.      DX-CHEST-PORTABLE (1 VIEW)   Final Result      1.  Satisfactory appearance of the ET tube and right IJ catheter.   2.  There is no visible pneumothorax.   3.  Stable perihilar and lower lobe opacities likely due to atelectasis and/or edema.      DX-CHEST-LIMITED (1 VIEW)   Final Result         No significant interval change.      GT-OMFFGVA-5 VIEW   Final Result      NG tube tip overlies the gastric body.      OUTSIDE IMAGES-CT ABDOMEN /PELVIS   Final Result      OUTSIDE IMAGES-DX CHEST   Final Result      OUTSIDE IMAGES-DX ABDOMEN   Final Result      OUTSIDE IMAGES-CT THORACIC SPINE   Final Result      DX-CHEST-LIMITED (1 VIEW)   Final Result         1.  Pulmonary edema and/or infiltrates.        Problem Representation:      Acute hypoxic respiratory failure with chronic hypercapnia      * Acute respiratory failure with hypoxia and chronic hypercapnia (HCC)  Assessment & Plan  Due to aspiration pneumonia/pneumonitis and likely chronic hypercapnia  from COPD compensated and normal pH   CTA from OSH per report no PE, BB ATX versus infiltrate  Component of atelectasis on x-ray as well       CT with dense RLL consolidation s/p PNA treatment -7 days Zosyn-bleed 7/24       Pa2:FiO2 ratio is low may be shunting from atelectasis and possibly distal PE not seen on prior CT     7/18 COVID SARS CoV2 PCR negative  7/18 Sputum Cx gram stain GPC, Cx pending  7/20 started on scopolamine patch due to increased oral secretions  7/21 intubated, bronched - large amount purulent secretions  7/22 self-extubated  Since he has been on HFNC, actually decreased requirements and clinically appears comfortable      Plan;   Continue HFNC, keep sat 88-92% try to wean FiO2 requirements down (now down to FiO2 60 with O2 88-92%)  Goal O2 88-92%   Continue diuresis to improve pulmonary edema and aggressive IS and mobilization to improve atelectasis, which could explain the low Pa2:FiO2 ratio <100, but may have another component like distal PE. Will order DVT LE study and continue lovenox and SCDs for DVT prophylaxis. Pt is max assist, nursing will work on getting pt up in the chair. Will also have RT provide a flutter valve to help with phlegm excretion. Continue mucolytic's.Transition to oxygen mask and then nasal cannula when clinically appropriate  Bilateral lower extremity US negative for DVT  RT to attempt weaning down FiO2 and consider trying patient on an oxy mask      Aspiration pneumonia (HCC)  Assessment & Plan  Status post 7 days antibiotics-Zosyn-completed 7/24  Imaging revealed dense pneumonic opacities right lung/ATX  Aggressive pulmonary toilet as tolerated with IS and frequent ambulation   Nebulized treatments       KORIN (acute kidney injury) (HCC)  Assessment & Plan  Creatinine normalized, continue serial BMP, normalized with time/hydration  CT abdomen pelvis from OSH did not mention hydronephrosis or obstruction  Monitor urine output  Avoid nephrotoxins     Goals of care,  counseling/discussion  Assessment & Plan  Palliative care following  Son Taurus describes his dad typically avoid health care and wouldn't any of this current level of care current getting family to come in and say goodbye vs trying to get back to [facility] and then plan towards focusing on his comfort   DNR/DNI  If he was to worsen Taurus would like to transitioning to comfort measures  Family conference 7/28 father and son  SANTO completed/signed

## 2020-08-06 NOTE — PROGRESS NOTES
Assumed care of patient, bedside report received from [address]. Updated on POC, call light within reach and fall precautions in place. Bed locked and in lowest position. Patient instructed to call for assistance before getting out of bed. All questions answered, no other needs at this time.

## 2020-08-06 NOTE — DISCHARGE PLANNING
Agency/Facility Name: [facility]  Spoke To: Toma   Outcome: Per Toma referral was not received complete and ask to please fax it to [phone]. [facility] re faxed referral.

## 2020-08-06 NOTE — CARE PLAN
Problem: Safety  Goal: Will remain free from falls  Outcome: PROGRESSING AS EXPECTED   Bed locked and in lowest position. Bed alarm on. Treaded socks in use. Call light and belongings within reach. Patient educated to call for assistance. Patient verbalizes understanding. Hourly rounding in place.    Problem: Respiratory:  Goal: Respiratory status will improve  Outcome: PROGRESSING AS EXPECTED   Patient requiring high flow nasal canula. Titrated from 60% to 50%. O2 averages 92%. Oral suctioning and oral hygience performed twice this shift by this RN.    Problem: Skin Integrity  Goal: Risk for impaired skin integrity will decrease  Outcome: PROGRESSING AS EXPECTED   Skin assessment completed. Excoriation on bilateral buttocks. Barrier cream applied. Q2 turns in place. Waffle overlay in use. Pillows in use for positioning and to float heels. Mepilex on bilateral heels and elbows. Frequent linen changes to ensure patient stays dry.

## 2020-08-06 NOTE — PROGRESS NOTES
"Hospital Medicine Daily Progress Note    Date of Service  8/6/2020    Chief Complaint  69 y.o. male admitted 7/17/2020 with SOB    Hospital Course  As per Dr. Sanderson note      \"69 y.o. male who presented 7/17/2020 with acute hypoxic respiratory failure.  He has a previous medical history that includes congestive heart failure, right eye enucleation following trauma, previous stroke, history of MRSA infection, diabetes, COPD, hypertension, coronary artery disease, DVT, hypothyroidism.  He originally presented to [facility] in [facility] and was admitted for possible aspiration pneumonitis.  He was transferred to us after approximately week at that facility due to worsening condition.  On arrival here the patient was on 15 L nonrebreather and satting in the low 90s.  His COVID was negative, subsequent cultures from the sputum grew out gram-positive cocci.  He was treated with 7 days of Zosyn which was completed on July 24.  He required intubation and bronchoscopy on July 21.  During that procedure he was found to have large amount of purulent secretions in the right mainstem and right lower lobe.  The patient went on to self extubate himself on July 22.  At that point he was made DNR however intubation okay after discussion with family.  Patient was transferred out of the ICU on July 29.  His ICU stay was also complicated by small bowel obstruction.  He was treated nonoperatively\".         Interval Problem Update    I evaluated and examined him at the bedside.  No acute complaints.  Ultrasound extremity venous bilateral showed no evidence of deep venous thrombosis.  Oxygen requirement is trending down and currently he is on 40 L of oxygen and maintaining saturation 95%.  Requested RN to decrease oxygen as titrated.  Pulmonology is following him.      Consultants/Specialty  Pulmonary    Code Status  DNR/I    Disposition  ?LTACH: referal placed    Review of Systems  Review of Systems   Constitutional: " Negative for chills, fever and weight loss.   HENT: Negative for hearing loss and tinnitus.    Eyes: Negative for blurred vision, double vision, photophobia and pain.   Respiratory: Negative for cough, sputum production and shortness of breath.    Cardiovascular: Negative for chest pain, palpitations, orthopnea and leg swelling.   Gastrointestinal: Negative for abdominal pain, constipation, diarrhea, nausea and vomiting.   Genitourinary: Negative for dysuria, frequency and urgency.   Musculoskeletal: Negative for back pain, joint pain, myalgias and neck pain.   Skin: Negative for rash.   Neurological: Negative for dizziness, tingling, tremors, sensory change, speech change, focal weakness and headaches.   Psychiatric/Behavioral: Negative for hallucinations and substance abuse.   All other systems reviewed and are negative.       Physical Exam  Temp:  [36.1 °C (97 °F)-36.9 °C (98.4 °F)] 36.4 °C (97.5 °F)  Pulse:  [60-80] 72  Resp:  [16-20] 16  BP: (105-121)/(60-69) 105/62  SpO2:  [89 %-95 %] 95 %    Physical Exam  Vitals signs reviewed.   Constitutional:       General: He is not in acute distress.  HENT:      Head: Normocephalic and atraumatic. No contusion.      Right Ear: External ear normal.      Left Ear: External ear normal.      Nose: Nose normal.      Mouth/Throat:      Mouth: Mucous membranes are dry.      Pharynx: No oropharyngeal exudate.   Eyes:      General:         Right eye: No discharge.         Left eye: No discharge.      Pupils: Pupils are equal, round, and reactive to light.      Comments: Chronic eye changes   Neck:      Musculoskeletal: No neck rigidity or muscular tenderness.   Cardiovascular:      Rate and Rhythm: Normal rate and regular rhythm.      Heart sounds: Murmur present. No friction rub. No gallop.    Pulmonary:      Effort: Pulmonary effort is normal.      Breath sounds: Rales present. No wheezing or rhonchi.   Abdominal:      General: Bowel sounds are normal. There is no distension.       Palpations: Abdomen is soft.      Tenderness: There is no abdominal tenderness. There is no rebound.   Musculoskeletal: Normal range of motion.         General: No swelling or tenderness.   Skin:     General: Skin is warm and dry.      Coloration: Skin is not jaundiced.   Neurological:      General: No focal deficit present.      Mental Status: He is alert.      Cranial Nerves: No cranial nerve deficit.      Sensory: No sensory deficit.   Psychiatric:         Mood and Affect: Mood normal.       I performed physical exam on August ,06 2020 and he remains similar without any acute changes.  Fluids    Intake/Output Summary (Last 24 hours) at 8/6/2020 0957  Last data filed at 8/6/2020 0900  Gross per 24 hour   Intake 880 ml   Output 1000 ml   Net -120 ml       Laboratory  Recent Labs     08/05/20  0911 08/06/20  0304   WBC 11.7* 11.9*   RBC 4.69* 4.35*   HEMOGLOBIN 13.7* 12.7*   HEMATOCRIT 43.5 39.8*   MCV 92.8 91.5   MCH 29.2 29.2   MCHC 31.5* 31.9*   RDW 46.1 45.4   PLATELETCT 299 319   MPV 12.1 11.6     Recent Labs     08/04/20  0854 08/05/20  0911   SODIUM 132* 136   POTASSIUM 4.1 3.9   CHLORIDE 93* 94*   CO2 30 30   GLUCOSE 97 94   BUN 29* 27*   CREATININE 0.90 0.86   CALCIUM 9.9 10.0                   Imaging  US-EXTREMITY VENOUS LOWER BILAT   Final Result      DX-CHEST-PORTABLE (1 VIEW)   Final Result         No significant interval change.      Low lung volumes with hypoventilatory change and bibasilar opacities.      DX-CHEST-PORTABLE (1 VIEW)   Final Result         1.  Pulmonary edema and/or infiltrates are identified, which are stable since the prior exam. Trace right pleural effusion            DX-CHEST-PORTABLE (1 VIEW)   Final Result         1.  Pulmonary edema and/or infiltrates are identified, which are stable since the prior exam. Trace right pleural effusion         EC-ECHOCARDIOGRAM COMPLETE W/ CONT   Final Result      JH-QJRVHIN-1 VIEW   Final Result      1.  Mildly prominent small bowel in LEFT  upper quadrant, of uncertain significance.  No definite bowel obstruction.   2.  Increased colonic stool suggesting constipation.   3.  Supportive tubing as described above.      DX-CHEST-PORTABLE (1 VIEW)   Final Result         1.  Pulmonary edema and/or infiltrates are identified, which are stable since the prior exam.   2.  Right midlung pulmonary nodule, visible on prior CT yesterday, see CT for further characterization and recommendations.      CT-CTA CHEST PULMONARY ARTERY W/ RECONS   Final Result      1.  No CT evidence for pulmonary emboli.   2.  RIGHT lower lobe atelectasis with bronchial occlusion.   3.  Small LEFT pleural effusion with associated atelectasis.   4.  Numerous ill-defined nodular densities in both lungs which may be inflammatory or neoplastic.  Septic emboli is a consideration.   5.  Supportive tubing as described above.               DX-CHEST-PORTABLE (1 VIEW)   Final Result         1.  Pulmonary edema and/or infiltrates are identified, which are stable since the prior exam.      DX-ABDOMEN FOR TUBE PLACEMENT   Final Result         Feeding tube with tip projecting over the expected area of the stomach fundus.      DX-CHEST-PORTABLE (1 VIEW)   Final Result         1.  Pulmonary edema and/or infiltrates are identified, which are stable since the prior exam.   2.  Small left pleural effusion      DX-CHEST-PORTABLE (1 VIEW)   Final Result         1.  Pulmonary edema and/or infiltrates are identified, which are stable since the prior exam.      DX-CHEST-PORTABLE (1 VIEW)   Final Result      1.  Satisfactory appearance of the ET tube and right IJ catheter.   2.  There is no visible pneumothorax.   3.  Stable perihilar and lower lobe opacities likely due to atelectasis and/or edema.      DX-CHEST-LIMITED (1 VIEW)   Final Result         No significant interval change.      DH-BNAXMXI-2 VIEW   Final Result      NG tube tip overlies the gastric body.      OUTSIDE IMAGES-CT ABDOMEN /PELVIS   Final  Result      OUTSIDE IMAGES-DX CHEST   Final Result      OUTSIDE IMAGES-DX ABDOMEN   Final Result      OUTSIDE IMAGES-CT THORACIC SPINE   Final Result      DX-CHEST-LIMITED (1 VIEW)   Final Result         1.  Pulmonary edema and/or infiltrates.           Assessment/Plan  * Acute respiratory failure with hypoxia (HCC)  Assessment & Plan  Extubated 7/22  Oxygen requirement is trending down currently he is on 40 L of oxygen and maintaining saturation.  Requested RN to titrate down oxygen.  He underwent ultrasound DVT bilateral lower extremity did not show any deep venous thrombosis.  Secretions have improved last 48hrs per RT  Cont O2/RT protocols  Pulmonology is following  Follow volume status closely: no improvement with diuresis over last 48hrs  Mobilize  I encourage him to use incentive spirometry.  I discussed case with pulmonology today.  Continue diuresis.      Aspiration pneumonia (HCC)  Assessment & Plan  Has completed 7 days of zosyn  1:1 feeding with RN staff  Aspiration precautions.    KORIN (acute kidney injury) (HCC)  Assessment & Plan  Now resolved  Continue to monitor with BMP.    SBO (small bowel obstruction) ([facility])  Assessment & Plan  Treated conservatively and resolved  Currently he is tolerating p.o. diet.  Abdominal exam did not show any signs of acute abdomen.  Continue bowel protocol.      Hypernatremia  Assessment & Plan  Resolved  Continue to monitor.    Goals of care, counseling/discussion  Assessment & Plan  At present pt is DNR/I  DW son 8/1     I discussed plan of care during multidisciplinary rounds.    I discussed plan of care with pulmonology.      VTE prophylaxis: enoxaparin

## 2020-08-06 NOTE — PROGRESS NOTES
Bedside report received from day shift RN. Assumed care. Pt is A&Ox2-3. Pt is in bed. Pt denies pain at this time. Pt was updated on plan of care. Pt has call light, personal belongings, and bedside table within reach. Bed is in the lowest position and bed alarm is on. Will continue to monitor

## 2020-08-07 VITALS
OXYGEN SATURATION: 95 % | RESPIRATION RATE: 18 BRPM | BODY MASS INDEX: 24.5 KG/M2 | SYSTOLIC BLOOD PRESSURE: 118 MMHG | HEIGHT: 74 IN | DIASTOLIC BLOOD PRESSURE: 61 MMHG | HEART RATE: 73 BPM | TEMPERATURE: 97.7 F | WEIGHT: 190.92 LBS

## 2020-08-07 PROBLEM — K56.609 SBO (SMALL BOWEL OBSTRUCTION) (HCC): Status: RESOLVED | Noted: 2020-07-17 | Resolved: 2020-08-07

## 2020-08-07 PROBLEM — N17.9 AKI (ACUTE KIDNEY INJURY) (HCC): Status: RESOLVED | Noted: 2020-07-17 | Resolved: 2020-08-07

## 2020-08-07 PROBLEM — E87.0 HYPERNATREMIA: Status: RESOLVED | Noted: 2020-07-27 | Resolved: 2020-08-07

## 2020-08-07 LAB
ANION GAP SERPL CALC-SCNC: 12 MMOL/L (ref 7–16)
BUN SERPL-MCNC: 30 MG/DL (ref 8–22)
CALCIUM SERPL-MCNC: 10.1 MG/DL (ref 8.5–10.5)
CHLORIDE SERPL-SCNC: 93 MMOL/L (ref 96–112)
CO2 SERPL-SCNC: 33 MMOL/L (ref 20–33)
CREAT SERPL-MCNC: 0.98 MG/DL (ref 0.5–1.4)
ERYTHROCYTE [DISTWIDTH] IN BLOOD BY AUTOMATED COUNT: 45.4 FL (ref 35.9–50)
GLUCOSE SERPL-MCNC: 101 MG/DL (ref 65–99)
HCT VFR BLD AUTO: 41.5 % (ref 42–52)
HGB BLD-MCNC: 13.4 G/DL (ref 14–18)
MCH RBC QN AUTO: 29.6 PG (ref 27–33)
MCHC RBC AUTO-ENTMCNC: 32.3 G/DL (ref 33.7–35.3)
MCV RBC AUTO: 91.6 FL (ref 81.4–97.8)
PLATELET # BLD AUTO: 322 K/UL (ref 164–446)
PMV BLD AUTO: 11.5 FL (ref 9–12.9)
POTASSIUM SERPL-SCNC: 4.1 MMOL/L (ref 3.6–5.5)
RBC # BLD AUTO: 4.53 M/UL (ref 4.7–6.1)
SODIUM SERPL-SCNC: 138 MMOL/L (ref 135–145)
WBC # BLD AUTO: 12.7 K/UL (ref 4.8–10.8)

## 2020-08-07 PROCEDURE — 99239 HOSP IP/OBS DSCHRG MGMT >30: CPT | Performed by: HOSPITALIST

## 2020-08-07 PROCEDURE — 700111 HCHG RX REV CODE 636 W/ 250 OVERRIDE (IP): Performed by: HOSPITALIST

## 2020-08-07 PROCEDURE — 700102 HCHG RX REV CODE 250 W/ 637 OVERRIDE(OP): Performed by: INTERNAL MEDICINE

## 2020-08-07 PROCEDURE — 94640 AIRWAY INHALATION TREATMENT: CPT

## 2020-08-07 PROCEDURE — A9270 NON-COVERED ITEM OR SERVICE: HCPCS | Performed by: INTERNAL MEDICINE

## 2020-08-07 PROCEDURE — 36415 COLL VENOUS BLD VENIPUNCTURE: CPT

## 2020-08-07 PROCEDURE — 80048 BASIC METABOLIC PNL TOTAL CA: CPT

## 2020-08-07 PROCEDURE — 85027 COMPLETE CBC AUTOMATED: CPT

## 2020-08-07 PROCEDURE — A9270 NON-COVERED ITEM OR SERVICE: HCPCS | Performed by: HOSPITALIST

## 2020-08-07 PROCEDURE — 700111 HCHG RX REV CODE 636 W/ 250 OVERRIDE (IP): Performed by: STUDENT IN AN ORGANIZED HEALTH CARE EDUCATION/TRAINING PROGRAM

## 2020-08-07 PROCEDURE — 700111 HCHG RX REV CODE 636 W/ 250 OVERRIDE (IP): Performed by: INTERNAL MEDICINE

## 2020-08-07 PROCEDURE — 700102 HCHG RX REV CODE 250 W/ 637 OVERRIDE(OP): Performed by: HOSPITALIST

## 2020-08-07 RX ORDER — FUROSEMIDE 10 MG/ML
20 INJECTION INTRAMUSCULAR; INTRAVENOUS 2 TIMES DAILY
Refills: 0 | Status: SHIPPED
Start: 2020-08-07 | End: 2022-12-06

## 2020-08-07 RX ORDER — ONDANSETRON 2 MG/ML
4 INJECTION INTRAMUSCULAR; INTRAVENOUS EVERY 4 HOURS PRN
Qty: 84 ML | Status: SHIPPED
Start: 2020-08-07 | End: 2022-12-06

## 2020-08-07 RX ORDER — SCOLOPAMINE TRANSDERMAL SYSTEM 1 MG/1
1 PATCH, EXTENDED RELEASE TRANSDERMAL
Qty: 4 PATCH | Refills: 3 | Status: SHIPPED | OUTPATIENT
Start: 2020-08-07 | End: 2022-12-06

## 2020-08-07 RX ORDER — ONDANSETRON 4 MG/1
4 TABLET, ORALLY DISINTEGRATING ORAL EVERY 4 HOURS PRN
Qty: 10 TAB | Refills: 0 | Status: SHIPPED
Start: 2020-08-07 | End: 2022-12-06

## 2020-08-07 RX ORDER — IPRATROPIUM BROMIDE AND ALBUTEROL SULFATE 2.5; .5 MG/3ML; MG/3ML
3 SOLUTION RESPIRATORY (INHALATION) EVERY 4 HOURS PRN
Status: SHIPPED
Start: 2020-08-07 | End: 2022-12-06

## 2020-08-07 RX ORDER — BUDESONIDE 0.5 MG/2ML
500 INHALANT ORAL 2 TIMES DAILY
Status: SHIPPED
Start: 2020-08-07 | End: 2022-12-06

## 2020-08-07 RX ORDER — LABETALOL HYDROCHLORIDE 5 MG/ML
10 INJECTION, SOLUTION INTRAVENOUS EVERY 4 HOURS PRN
Refills: 0 | Status: SHIPPED
Start: 2020-08-07 | End: 2022-12-06

## 2020-08-07 RX ADMIN — ENOXAPARIN SODIUM 40 MG: 40 INJECTION SUBCUTANEOUS at 05:14

## 2020-08-07 RX ADMIN — BUDESONIDE 0.5 MG: 0.5 SUSPENSION RESPIRATORY (INHALATION) at 07:09

## 2020-08-07 RX ADMIN — SCOLOPAMINE TRANSDERMAL SYSTEM 1 PATCH: 1 PATCH, EXTENDED RELEASE TRANSDERMAL at 14:30

## 2020-08-07 RX ADMIN — GUAIFENESIN 200 MG: 100 SOLUTION ORAL at 14:29

## 2020-08-07 RX ADMIN — GUAIFENESIN 200 MG: 100 SOLUTION ORAL at 05:12

## 2020-08-07 RX ADMIN — FUROSEMIDE 20 MG: 10 INJECTION, SOLUTION INTRAMUSCULAR; INTRAVENOUS at 05:14

## 2020-08-07 RX ADMIN — DOCUSATE SODIUM 50 MG AND SENNOSIDES 8.6 MG 2 TABLET: 8.6; 5 TABLET, FILM COATED ORAL at 05:10

## 2020-08-07 NOTE — DISCHARGE PLANNING
Anticipated Discharge Disposition: [facility]    Action: RN AMBER updated patient's son, Taurus, that patient was accepted to [facility] and that they can take the patient today. He was agreeable. RN AMBER informed him that [facility] will be picking patient up today at around 1500.    Barriers to Discharge: none    Plan: Patient to discharge to [facility] at 1500 via [facility]      Completed cobra and transfer packet placed with patient's chart with POLST inside, BS RN, [facility], aware.

## 2020-08-07 NOTE — PROGRESS NOTES
Patient discharged via [facility]. Patient transporting to [facility]. Patient placed on non-rebreather at 15L. Discharge paperwork signed and provided to patient. COBRA packet complete. Patient's son, Taurus, notified by CM of transfer.

## 2020-08-07 NOTE — DISCHARGE INSTRUCTIONS
Critical Illness, Suggestions for Family and Friends  While your family member or friend is in the hospital, you may feel a lot of stress. One day the patient may seem to be recovering, and the next day things may take a turn for the worse. It can be a scary experience to see someone you care about on a breathing machine.   Several caregivers work together to give care to your family member. These healthcare professionals are often specialists who are treating different concerns of the patient. For example, an infectious disease specialist might be involved to help make sure that the initial infection or an infectious complication is properly treated. A lung specialist may be adjusting the settings on the breathing machine and a kidney specialist may be required if kidney failure occurs. These caregivers talk to each other regularly to make sure that care is given in a coordinated manner.  Even though you may feel helpless, here are some things you can do to help:  · Talk to the caregivers, nurses, and other health care providers. Ask questions about the patient's condition and care, and ask how you can support your loved one.   · Talk to the patient, even if he or she is in a drug-induced sleep. Talk about fun things you did together and laugh with them. Many survivors say they were, at some level, aware of the people and things around them. They also recall dreams they had while in the sleep-induced state. The dreams can be calming or frightening. Talking to the patient about happy things may help make the dreams more positive.   · Ask the hospital staff if you can put family photos near the patient, play music at low volume, or rub lotion onto the patient. This may help to trigger their senses of hearing, touch, and seeing.   · Leave fears and worries at the door. Always go outside the patient's room to talk with the doctor or nurse about the patient's condition. Make sure everyone is encouraging and  "hopeful while with the patient. The patient may sense stress in their presence.   · Keep a journal or record of events for the patient to read after leaving the hospital. Some survivors want to know every detail of what happened while they were asleep.   · Remember to take care of yourself, too. Try to get rest and sleep, eat well, and get some exercise. Call on other family members and friends to sit with the patient so you can have a break. Your very sick loved one will need your strength and support over what may be a long recovery period.   · The family members and friends of people with a critical illness often are deeply affected by the experience. The \"roller-coaster\" ride of emotions while the patient is in the hospital can be exhausting and stressful. Caring for the survivor at home can also be stressful and tiring. Ask for help from others or from your health care providers if you need it. Most hospitals have individuals such as social workers who can help family member cope with these issues.   Document Released: 08/30/2005 Document Revised: 03/11/2013 Document Reviewed: 10/09/2009  [employer]® Patient Information ©2013 [employer].        Discharge Instructions    Discharged to other by ambulance with self. Discharged via ambulance, hospital escort: Yes.  Special equipment needed: Oxygen / high flow     Be sure to schedule a follow-up appointment with your primary care doctor or any specialists as instructed.     Discharge Plan:        I understand that a diet low in cholesterol, fat, and sodium is recommended for good health. Unless I have been given specific instructions below for another diet, I accept this instruction as my diet prescription.   Other diet: Regular, 1:1 feed, thickened liquids    Special Instructions: None    · Is patient discharged on Warfarin / Coumadin?   No     Depression / Suicide Risk    As you are discharged from this [facility] facility, it is important to learn how to keep " safe from harming yourself.    Recognize the warning signs:  · Abrupt changes in personality, positive or negative- including increase in energy   · Giving away possessions  · Change in eating patterns- significant weight changes-  positive or negative  · Change in sleeping patterns- unable to sleep or sleeping all the time   · Unwillingness or inability to communicate  · Depression  · Unusual sadness, discouragement and loneliness  · Talk of wanting to die  · Neglect of personal appearance   · Rebelliousness- reckless behavior  · Withdrawal from people/activities they love  · Confusion- inability to concentrate     If you or a loved one observes any of these behaviors or has concerns about self-harm, here's what you can do:  · Talk about it- your feelings and reasons for harming yourself  · Remove any means that you might use to hurt yourself (examples: pills, rope, extension cords, firearm)  · Get professional help from the community (Mental Health, Substance Abuse, psychological counseling)  · Do not be alone:Call your Safe Contact- someone whom you trust who will be there for you.  · Call your local CRISIS HOTLINE 036-1550 or 666-379-5234  · Call your local Children's Mobile Crisis Response Team [place] (032) 914-4153 or www.[place]  · Call the toll free National Suicide Prevention Hotlines   · National Suicide Prevention Lifeline 874-971-UFMW (8632)  · National Hope Line Network 800-SUICIDE (100-7423)          Acute Respiratory Failure, Adult    Acute respiratory failure occurs when there is not enough oxygen passing from your lungs to your body. When this happens, your lungs have trouble removing carbon dioxide from the blood. This causes your blood oxygen level to drop too low as carbon dioxide builds up.  Acute respiratory failure is a medical emergency. It can develop quickly, but it is temporary if treated promptly. Your lung capacity, or how much air your lungs can hold, may improve with  time, exercise, and treatment.  What are the causes?  There are many possible causes of acute respiratory failure, including:  · Lung injury.  · Chest injury or damage to the ribs or tissues near the lungs.  · Lung conditions that affect the flow of air and blood into and out of the lungs, such as pneumonia, acute respiratory distress syndrome, and cystic fibrosis.  · Medical conditions, such as strokes or spinal cord injuries, that affect the muscles and nerves that control breathing.  · Blood infection (sepsis).  · Inflammation of the pancreas (pancreatitis).  · A blood clot in the lungs (pulmonary embolism).  · A large-volume blood transfusion.  · Burns.  · Near-drowning.  · Seizure.  · Smoke inhalation.  · Reaction to medicines.  · Alcohol or drug overdose.  What increases the risk?  This condition is more likely to develop in people who have:  · A blocked airway.  · Asthma.  · A condition or disease that damages or weakens the muscles, nerves, bones, or tissues that are involved in breathing.  · A serious infection.  · A health problem that blocks the unconscious reflex that is involved in breathing, such as hypothyroidism or sleep apnea.  · A lung injury or trauma.  What are the signs or symptoms?  Trouble breathing is the main symptom of acute respiratory failure. Symptoms may also include:  · Rapid breathing.  · Restlessness or anxiety.  · Skin, lips, or fingernails that appear blue (cyanosis).  · Rapid heart rate.  · Abnormal heart rhythms (arrhythmias).  · Confusion or changes in behavior.  · Tiredness or loss of energy.  · Feeling sleepy or having a loss of consciousness.  How is this diagnosed?  Your health care provider can diagnose acute respiratory failure with a medical history and physical exam. During the exam, your health care provider will listen to your heart and check for crackling or wheezing sounds in your lungs. Your may also have tests to confirm the diagnosis and determine what is causing  respiratory failure. These tests may include:  · Measuring the amount of oxygen in your blood (pulse oximetry). The measurement comes from a small device that is placed on your finger, earlobe, or toe.  · Other blood tests to measure blood gases and to look for signs of infection.  · Sampling your cerebral spinal fluid or tracheal fluid to check for infections.  · Chest X-ray to look for fluid in spaces that should be filled with air.  · Electrocardiogram (ECG) to look at the heart's electrical activity.  How is this treated?  Treatment for this condition usually takes places in a hospital intensive care unit (ICU). Treatment depends on what is causing the condition. It may include one or more treatments until your symptoms improve. Treatment may include:  · Supplemental oxygen. Extra oxygen is given through a tube in the nose, a face mask, or a burroughs.  · A device such as a continuous positive airway pressure (CPAP) or bi-level positive airway pressure (BiPAP or BPAP) machine. This treatment uses mild air pressure to keep the airways open. A mask or other device will be placed over your nose or mouth. A tube that is connected to a motor will deliver oxygen through the mask.  · Ventilator. This treatment helps move air into and out of the lungs. This may be done with a bag and mask or a machine. For this treatment, a tube is placed in your windpipe (trachea) so air and oxygen can flow to the lungs.  · Extracorporeal membrane oxygenation (ECMO). This treatment temporarily takes over the function of the heart and lungs, supplying oxygen and removing carbon dioxide. ECMO gives the lungs a chance to recover. It may be used if a ventilator is not effective.  · Tracheostomy. This is a procedure that creates a hole in the neck to insert a breathing tube.  · Receiving fluids and medicines.  · Rocking the bed to help breathing.  Follow these instructions at home:  · Take over-the-counter and prescription medicines only as told  by your health care provider.  · Return to normal activities as told by your health care provider. Ask your health care provider what activities are safe for you.  · Keep all follow-up visits as told by your health care provider. This is important.  How is this prevented?  Treating infections and medical conditions that may lead to acute respiratory failure can help prevent the condition from developing.  Contact a health care provider if:  · You have a fever.  · Your symptoms do not improve or they get worse.  Get help right away if:  · You are having trouble breathing.  · You lose consciousness.  · Your have cyanosis or turn blue.  · You develop a rapid heart rate.  · You are confused.  These symptoms may represent a serious problem that is an emergency. Do not wait to see if the symptoms will go away. Get medical help right away. Call your local emergency services (911 in the [place] Do not drive yourself to the hospital.  This information is not intended to replace advice given to you by your health care provider. Make sure you discuss any questions you have with your health care provider.  Document Released: 12/23/2014 Document Revised: 11/30/2018 Document Reviewed: 07/05/2017  [employer] Patient Education © 2020 [employer]            Sepsis, Diagnosis, Adult  Sepsis is a serious bodily reaction to an infection. The infection that triggers sepsis may be from a bacteria, virus, or fungus. Sepsis can result from an infection in any part of your body. Infections that commonly lead to sepsis include skin, lung, and urinary tract infections.  Sepsis is a medical emergency that must be treated right away in a hospital. In severe cases, it can lead to septic shock. Septic shock can weaken your heart and cause your blood pressure to drop. This can cause your central nervous system and your body's organs to stop working.  What are the causes?  This condition is caused by a severe reaction to infections from bacteria,  viruses, or fungus. The germs that most often lead to sepsis include:  · Escherichia coli (E. coli) bacteria.  · Staphylococcus aureus (staph) bacteria.  · Some types of Streptococcus bacteria.  The most common infections affect these organs:  · The lung (pneumonia).  · The kidneys or bladder (urinary tract infection).  · The skin (cellulitis).  · The bowel, gallbladder, or pancreas.  What increases the risk?  You are more likely to develop this condition if:  · Your body's disease-fighting system (immune system) is weakened.  · You are age 65 or older.  · You are male.  · You had surgery or you have been hospitalized.  · You have these devices inserted into your body:  ? A small, thin tube (catheter).  ? IV line.  ? Breathing tube.  ? Drainage tube.  · You are not getting enough nutrients from food (malnourished).  · You have a long-term (chronic) disease, such as cancer, lung disease, kidney disease, or diabetes.  · You are .  What are the signs or symptoms?  Symptoms of this condition may include:  · Fever.  · Chills or feeling very cold.  · Confusion or anxiety.  · Fatigue.  · Muscle aches.  · Shortness of breath.  · Nausea and vomiting.  · Urinating much less than usual.  · Fast heart rate (tachycardia).  · Rapid breathing (hyperventilation).  · Changes in skin color. Your skin may look blotchy, pale, or blue.  · Cool, clammy, or sweaty skin.  · Skin rash.  Other symptoms depend on the source of your infection.  How is this diagnosed?  This condition is diagnosed based on:  · Your symptoms.  · Your medical history.  · A physical exam.  Other tests may also be done to find out the cause of the infection and how severe the sepsis is. These tests may include:  · Blood tests.  · Urine tests.  · Swabs from other areas of your body that may have an infection. These samples may be tested (cultured) to find out what type of bacteria is causing the infection.  · Chest X-ray to check for pneumonia. Other  imaging tests, such as a CT scan, may also be done.  · Lumbar puncture. This removes a small amount of the fluid that surrounds your brain and spinal cord. The fluid is then examined for infection.  How is this treated?  This condition must be treated in a hospital. Based on the cause of your infection, you may be given an antibiotic, antiviral, or antifungal medicine.  You may also receive:  · Fluids through an IV.  · Oxygen and breathing assistance.  · Medicines to increase your blood pressure.  · Kidney dialysis. This process cleans your blood if your kidneys have failed.  · Surgery to remove infected tissue.  · Blood transfusion if needed.  · Medicine to prevent blood clots.  · Nutrients to correct imbalances in basic body function (metabolism). You may:  ? Receive important salts and minerals (electrolytes) through an IV.  ? Have your blood sugar level adjusted.  Follow these instructions at home:  Medicines    · Take over-the-counter and prescription medicines only as told by your health care provider.  · If you were prescribed an antibiotic, antiviral, or antifungal medicine, take it as told by your health care provider. Do not stop taking the medicine even if you start to feel better.  General instructions  · If you have a catheter or other indwelling device, ask to have it removed as soon as possible.  · Keep all follow-up visits as told by your health care provider. This is important.  Contact a health care provider if:  · You do not feel like you are getting better or regaining strength.  · You are having trouble coping with your recovery.  · You frequently feel tired.  · You feel worse or do not seem to get better after surgery.  · You think you may have an infection after surgery.  Get help right away if:  · You have any symptoms of sepsis.  · You have difficulty breathing.  · You have a rapid or skipping heartbeat.  · You become confused or disoriented.  · You have a high fever.  · Your skin becomes  blotchy, pale, or blue.  · You have an infection that is getting worse or not getting better.  These symptoms may represent a serious problem that is an emergency. Do not wait to see if the symptoms will go away. Get medical help right away. Call your local emergency services (911 in the [place] Do not drive yourself to the hospital.  Summary  · Sepsis is a medical emergency that requires immediate treatment in a hospital.  · This condition is caused by a severe reaction to infections from bacteria, viruses, or fungus.  · Based on the cause of your infection, you may be given an antibiotic, antiviral, or antifungal medicine.  · Treatment may also include IV fluids, breathing assistance, and kidney dialysis.  This information is not intended to replace advice given to you by your health care provider. Make sure you discuss any questions you have with your health care provider.  Document Released: 09/15/2004 Document Revised: 07/26/2019 Document Reviewed: 07/26/2019  [employer] Patient Education © 2020 [employer]

## 2020-08-07 NOTE — DISCHARGE PLANNING
Agency/Facility Name: [facility]  Spoke To: Erin   Outcome: Per Erin referral accepted. Facility has beds. Per Erin Doctor will need to talk to their facility doctor first. [phone].

## 2020-08-07 NOTE — DISCHARGE PLANNING
Anticipated Discharge Disposition: [facility]    Action: Per Ese with [facility], no bed available at facility today as of right now. Avani [facility]ILLER [facility] with [facility].    Barriers to Discharge: bed availability at [facility]    Plan: Case coordination to f/u with [facility]    Per Erin from [facility], they have accepted patient and can take him today, they just need physician to call their doctor at [phone]. RN CM notified Dr. Vargas and inquired if patient can discharge today. Awaiting response.

## 2020-08-07 NOTE — PROGRESS NOTES
Bedside report received from day shift RN. Assumed care. Pt is A&Ox3. Pt is in bed. Pt denies pain at this time. Pt was updated on plan of care. Pt has call light, personal belongings, and bedside table within reach. Bed is in the lowest position and bed alarm is on. Will continue to monitor

## 2020-08-07 NOTE — DISCHARGE PLANNING
Agency/Facility Name: [facility]  Spoke To: Erin   Outcome: Per Erin request discharge summary was faxed.,    Received Transport Form @ 1158  Spoke to Jennie @ [facility]    Transport is scheduled for Today 8/7/2020 @1500 going to [facility].

## 2020-08-07 NOTE — CARE PLAN
Problem: Nutritional:  Goal: Nutrition support tolerated and meeting greater than 85% of estimated needs  Outcome: DISCHARGED-GOAL NOT MET     Problem: Nutritional:  Goal: Achieve adequate nutritional intake  Description: Patient will consume ~50% or > of meals/supplements  Outcome: PROGRESSING SLOWER THAN EXPECTED   Most liquidized meals refused; however, pt drinking 100% mildly thick Boost Supplements TID. Added extra mildly thick SB Boost to HS snack to help meet est. nutritional needs.    RD continues to follow.

## 2020-08-07 NOTE — CARE PLAN
Problem: Safety  Goal: Will remain free from falls  Outcome: PROGRESSING AS EXPECTED   Bed locked and in lowest position. Bed alarm on. Treaded socks in use. Call light and belongings within reach. Patient educated to call for assistance. Patient verbalizes understanding. Hourly rounding in place.     Problem: Discharge Barriers/Planning  Goal: Patient's continuum of care needs will be met  Outcome: PROGRESSING AS EXPECTED   Patient is cleared for discharge and [facility] to transfer patient to [facility] at 1530    Problem: Respiratory:  Goal: Respiratory status will improve  Outcome: PROGRESSING SLOWER THAN EXPECTED  Patient is requiring high flow nasal canula; unable to wean. Patient on 40L at 60%.

## 2020-08-07 NOTE — DISCHARGE SUMMARY
Discharge Summary    CHIEF COMPLAINT ON ADMISSION  No chief complaint on file.      Reason for Admission  Sepsis     CODE STATUS  DNAR/DNI    HPI & HOSPITAL COURSE  Patient is a 69 year old male who presented 7/17/2020 with acute hypoxic respiratory failure.  He has a previous medical history of congestive heart failure with an EF of 50%, right eye enucleation following trauma, previous stroke, history of MRSA infection, diabetes, COPD, hypertension, coronary artery disease, DVT, hypothyroidism.  He originally presented to [facility] in [facility] and was admitted for possible aspiration pneumonitis.  He was transferred to [facility] after approximately week at that facility due to worsening condition.  On arrival here the patient was on 15 L nonrebreather and satting in the low 90s.  His COVID was negative and dopplers were negative for a dvt. Cultures from the sputum grew out gram-positive cocci.  He was treated with 7 days of Zosyn which was completed on July 24.  He required intubation and bronchoscopy on July 21.  During that procedure he was found to have large amount of purulent secretions in the right mainstem and right lower lobe.  The patient went on to self extubate himself on July 22.  At that point he was made DNR however intubation okay after discussion with family.  Patient was transferred out of the ICU on July 29. His ICU stay was also complicated by small bowel obstruction that resolved medically.  He remains on high flow oxygen between 40 %- 60% Fio2 with ongoing diuresis and aggressive pulmonary toiletry with use of flutter valve.  Pulmonary has continued to follow him.  He has been accepted to [facility] and will continue treatment there.       Therefore, he is discharged in guarded and stable condition to a long-term acute care hospital.    The patient met 2-midnight criteria for an inpatient stay at the time of discharge.      FOLLOW UP ITEMS POST DISCHARGE  LTACH    DISCHARGE  DIAGNOSES  Principal Problem:    Acute respiratory failure with hypoxia (HCC) POA: Unknown  Active Problems:    Aspiration pneumonia (HCC) POA: Unknown    KORIN (acute kidney injury) (HCC) POA: Unknown    SBO (small bowel obstruction) (HCC) POA: Unknown    Goals of care, counseling/discussion POA: Unknown    Hypernatremia POA: Unknown  Resolved Problems:    Leukocytosis POA: Unknown    Lactic acidosis POA: Unknown      FOLLOW UP  No future appointments.  No follow-up provider specified.    MEDICATIONS ON DISCHARGE     Medication List      ASK your doctor about these medications      Instructions   Artificial Tears 1.4 % Soln  Generic drug: artificial tears   Place 1 Drop in both eyes every four hours as needed.  Dose: 1 Drop     DULCOLAX PO   Take 10 mg by mouth every 24 hours as needed. Take 10 mg by mouth one time daily as needed for constipation  Dose: 10 mg     lisinopril 20 MG Tabs  Commonly known as: PRINIVIL   Take 20 mg by mouth every day. Take 20 mg by mouth one time daily  Dose: 20 mg            Allergies  No Known Allergies    DIET   LQ3/MT2 diet with adherence to the following: upright at 90* for PO, slow rate, small bites, no straws, 1:1 supervision, cough/throat clear with wet vocal quality    Orders Placed This Encounter   Procedures   • Diet Order Regular     Standing Status:   Standing     Number of Occurrences:   1     Order Specific Question:   Diet:     Answer:   Regular [1]     Order Specific Question:   Texture Modifier     Answer:   Level 3 - Liquidised (Nectar Thick Full Liquid)     Order Specific Question:   Liquid Level     Answer:   Level 2 - Mildly Thick     Order Specific Question:   Miscellaneous modifications:     Answer:   SLP - 1:1 Supervision by Nursing [21]     Order Specific Question:   Miscellaneous modifications:     Answer:   SLP - Deliver to Nursing Station [22]       ACTIVITY  As tolerated and directed by rehab.  Weight bearing as tolerated    LINES, DRAINS, AND WOUNDS  This is  an automated list. Peripheral IVs will be removed prior to discharge.  Peripheral IV 20 G Anterior;Left Forearm (Active)   Site Assessment Clean;Dry;Intact 08/06/20 2045   Dressing Type Transparent 08/06/20 2045   Line Status Scrubbed the hub prior to access;Flushed;Saline locked 08/06/20 2045   Dressing Status Clean;Dry;Intact 08/06/20 2045   Dressing Intervention N/A 08/06/20 2045   Date Primary Tubing Changed 07/26/20 07/26/20 1100   NEXT Primary Tubing Change  07/29/20 07/26/20 1100   Infiltration Grading ([facility], [facility]) 0 08/06/20 2045   Phlebitis Scale ([facility] Only) 0 08/06/20 2045     Urethral Catheter Temperature probe (Active)   Site Assessment Red;Clean;Skin intact 08/07/20 0500   Collection Container Standard drainage bag 08/07/20 0500   Urinary Catheter Care Tamper Evident Seal in Place;Drainage Tube Extended;Drainage Bag Not Overfilled;Drainage Tubing Properly Secured;Drainage Bag Below Bladder Level and Not on Floor 08/07/20 0500   Securement Method Securing device (Describe) 08/07/20 0500   Output (mL) 650 mL 08/07/20 0500      Wound 07/18/20 Pressure Injury Ear Right (Active)   Wound Image   07/19/20 1400   Site Assessment Pink;Red 08/05/20 0903   Periwound Assessment Dry;Intact;Pink 08/04/20 1945   Margins Attached edges 08/04/20 1945   Closure Adhesive bandage 08/03/20 0800   Drainage Amount None 08/03/20 0800   Treatments Cleansed 08/04/20 1600   Wound Cleansing Approved Wound Cleanser 08/04/20 1600   Periwound Protectant Skin Protectant Wipes to Periwound 08/04/20 1600   Dressing Cleansing/Solutions Not Applicable 08/02/20 1942   Dressing Options Hydrocolloid Thin;Mepilex 08/04/20 1945   Dressing Changed New 08/04/20 1600   Dressing Status Clean;Dry;Intact 08/04/20 1600   Dressing Change/Treatment Frequency Every 72 hrs, and As Needed 08/04/20 1600   NEXT Dressing Change/Treatment Date 08/07/20 08/04/20 1600   NEXT Weekly Photo (Inpatient Only) 07/22/20 07/21/20 0800   Pressure Injury Stage DTPI  07/19/20 1400   Non-staged Wound Description Not applicable 07/19/20 1400   Wound Length (cm) 0.5 cm 07/19/20 1400   Wound Width (cm) 0.2 cm 07/19/20 1400   Wound Surface Area (cm^2) 0.1 cm^2 07/19/20 1400   Shape Circular 07/19/20 1400   Wound Odor None 07/19/20 1400   Exposed Structures EMMA 07/19/20 1400   WOUND NURSE ONLY - Time Spent with Patient (mins) 75 07/19/20 1400       Wound 07/18/20 Pressure Injury Ear Left (Active)   Wound Image    07/19/20 1400   Site Assessment Pink;Red 08/05/20 0903   Periwound Assessment Clean;Dry;Intact;Pink 08/04/20 1945   Margins Attached edges;Defined edges 08/04/20 1945   Closure Adhesive bandage 08/03/20 0800   Drainage Amount None 08/04/20 1945   Treatments Cleansed 08/04/20 1600   Wound Cleansing Approved Wound Cleanser 08/04/20 1600   Periwound Protectant Skin Protectant Wipes to Periwound 08/04/20 1600   Dressing Cleansing/Solutions Not Applicable 08/02/20 1942   Dressing Options Hydrocolloid Thin;Mepilex 08/04/20 1945   Dressing Changed New 08/04/20 1600   Dressing Status Clean;Dry;Intact 08/04/20 1600   Dressing Change/Treatment Frequency Every 72 hrs, and As Needed 08/04/20 1600   NEXT Dressing Change/Treatment Date 08/07/20 08/04/20 1600   NEXT Weekly Photo (Inpatient Only) 07/22/20 07/21/20 0800   Pressure Injury Stage U 07/19/20 1400   Non-staged Wound Description Not applicable 07/19/20 1400   Wound Length (cm) 0.2 cm 07/19/20 1400   Wound Width (cm) 0.3 cm 07/19/20 1400   Wound Surface Area (cm^2) 0.06 cm^2 07/19/20 1400   Shape Circular 07/19/20 1400   Wound Odor None 07/19/20 1400   Exposed Structures EMMA 07/19/20 1400       Wound 07/19/20 Other (comment) Penis Redness, penis (Active)   Wound Image   07/19/20 0800   Site Assessment Pink;Red;Fragile 08/05/20 0903   Periwound Assessment Pink;Intact 08/04/20 1945   Drainage Amount None 08/04/20 1945   Periwound Protectant Skin Protectant Wipes to Periwound 08/02/20 1942   Dressing Options Open to Air 08/04/20 1945    Dressing Status Clean;Dry;Intact 08/02/20 1942       Peripheral IV 20 G Anterior;Left Forearm (Active)   Site Assessment Clean;Dry;Intact 08/06/20 2045   Dressing Type Transparent 08/06/20 2045   Line Status Scrubbed the hub prior to access;Flushed;Saline locked 08/06/20 2045   Dressing Status Clean;Dry;Intact 08/06/20 2045   Dressing Intervention N/A 08/06/20 2045   Date Primary Tubing Changed 07/26/20 07/26/20 1100   NEXT Primary Tubing Change  07/29/20 07/26/20 1100   Infiltration Grading ([facility], [facility]) 0 08/06/20 2045   Phlebitis Scale ([facility]) 0 08/06/20 2045           Urethral Catheter Temperature probe (Active)   Site Assessment Red;Clean;Skin intact 08/07/20 0500   Collection Container Standard drainage bag 08/07/20 0500   Urinary Catheter Care Tamper Evident Seal in Place;Drainage Tube Extended;Drainage Bag Not Overfilled;Drainage Tubing Properly Secured;Drainage Bag Below Bladder Level and Not on Floor 08/07/20 0500   Securement Method Securing device (Describe) 08/07/20 0500   Output (mL) 650 mL 08/07/20 0500        MENTAL STATUS ON TRANSFER  Level of Consciousness: Confused  Orientation : Disoriented to Event, Disoriented to Time  Speech: Speech Clear    CONSULTATIONS  Critical care/ Pulmonary    PROCEDURES  US-EXTREMITY VENOUS LOWER BILAT   Final Result      DX-CHEST-PORTABLE (1 VIEW)   Final Result         No significant interval change.      Low lung volumes with hypoventilatory change and bibasilar opacities.      DX-CHEST-PORTABLE (1 VIEW)   Final Result         1.  Pulmonary edema and/or infiltrates are identified, which are stable since the prior exam. Trace right pleural effusion            DX-CHEST-PORTABLE (1 VIEW)   Final Result         1.  Pulmonary edema and/or infiltrates are identified, which are stable since the prior exam. Trace right pleural effusion         EC-ECHOCARDIOGRAM COMPLETE W/ CONT   Final Result      [id number] VIEW   Final Result      1.  Mildly prominent small  bowel in LEFT upper quadrant, of uncertain significance.  No definite bowel obstruction.   2.  Increased colonic stool suggesting constipation.   3.  Supportive tubing as described above.      DX-CHEST-PORTABLE (1 VIEW)   Final Result         1.  Pulmonary edema and/or infiltrates are identified, which are stable since the prior exam.   2.  Right midlung pulmonary nodule, visible on prior CT yesterday, see CT for further characterization and recommendations.      CT-CTA CHEST PULMONARY ARTERY W/ RECONS   Final Result      1.  No CT evidence for pulmonary emboli.   2.  RIGHT lower lobe atelectasis with bronchial occlusion.   3.  Small LEFT pleural effusion with associated atelectasis.   4.  Numerous ill-defined nodular densities in both lungs which may be inflammatory or neoplastic.  Septic emboli is a consideration.   5.  Supportive tubing as described above.               DX-CHEST-PORTABLE (1 VIEW)   Final Result         1.  Pulmonary edema and/or infiltrates are identified, which are stable since the prior exam.      DX-ABDOMEN FOR TUBE PLACEMENT   Final Result         Feeding tube with tip projecting over the expected area of the stomach fundus.      DX-CHEST-PORTABLE (1 VIEW)   Final Result         1.  Pulmonary edema and/or infiltrates are identified, which are stable since the prior exam.   2.  Small left pleural effusion      DX-CHEST-PORTABLE (1 VIEW)   Final Result         1.  Pulmonary edema and/or infiltrates are identified, which are stable since the prior exam.      DX-CHEST-PORTABLE (1 VIEW)   Final Result      1.  Satisfactory appearance of the ET tube and right IJ catheter.   2.  There is no visible pneumothorax.   3.  Stable perihilar and lower lobe opacities likely due to atelectasis and/or edema.      DX-CHEST-LIMITED (1 VIEW)   Final Result         No significant interval change.      [id number] VIEW   Final Result      NG tube tip overlies the gastric body.      OUTSIDE IMAGES-CT ABDOMEN  /PELVIS   Final Result      OUTSIDE IMAGES-DX CHEST   Final Result      OUTSIDE IMAGES-DX ABDOMEN   Final Result      OUTSIDE IMAGES-CT THORACIC SPINE   Final Result      DX-CHEST-LIMITED (1 VIEW)   Final Result         1.  Pulmonary edema and/or infiltrates.            LABORATORY  Lab Results   Component Value Date    SODIUM 138 08/07/2020    POTASSIUM 4.1 08/07/2020    CHLORIDE 93 (L) 08/07/2020    CO2 33 08/07/2020    GLUCOSE 101 (H) 08/07/2020    BUN 30 (H) 08/07/2020    CREATININE 0.98 08/07/2020        Lab Results   Component Value Date    WBC 12.7 (H) 08/07/2020    HEMOGLOBIN 13.4 (L) 08/07/2020    HEMATOCRIT 41.5 (L) 08/07/2020    PLATELETCT 322 08/07/2020        Total time of the discharge process exceeds 45 minutes.

## 2020-08-25 NOTE — DOCUMENTATION QUERY
"                                                                         [facility]                                                                       Query Response Note      PATIENT:               JUJU CARMONA  ACCT #:                  [id number]  MRN:                     [id number]  :                      1950  ADMIT DATE:       2020 3:30 PM  DISCH DATE:        2020 4:24 PM  RESPONDING  PROVIDER #:        [id number]           QUERY TEXT:    Sepsis is documented in the Medical Record. Please clarify whether:    NOTE:  If an appropriate response is not listed below, please respond with a new note.    The patient's Clinical Indicators include:  Sepsis is documented in the medical record. However, it is unclear whether present on admission or ruled out.     Per PN , Patient was being treated at another healthcare facility for pneumonia and sepsis (Wbc 23.3).     Per consult , \"patient had emesis with subsequent worsening hypoxic respiratory failure and fever concerning of aspiration pneumonitis and transferred to [facility] for higher level of care.\" Temp 98.1 Resp 14 Bp 119/43 Wbc 19.7      Treatment: Antibiotics, Intubation, Bronchoscopy  Other related factors: acidosis, fatmata, hypernatremia, DNR status  Options provided:   -- Sepsis ruled in, present on admission   -- Sepsis ruled in, developed after admission   -- Sepsis has been ruled out   -- Unable to determine      Query created by: Jose Manuel Brasher on 2020 2:04 PM    RESPONSE TEXT:    Sepsis ruled in, present on admission          Electronically signed by:  TRACI ROUSE MD 2020 2:33 PM              "

## 2022-12-06 PROBLEM — I50.22 CHRONIC SYSTOLIC CONGESTIVE HEART FAILURE (HCC): Status: ACTIVE | Noted: 2022-12-06

## 2022-12-06 PROBLEM — I71.40 AAA (ABDOMINAL AORTIC ANEURYSM) WITHOUT RUPTURE (HCC): Status: ACTIVE | Noted: 2022-12-06

## 2022-12-06 PROBLEM — Z71.89 GOALS OF CARE, COUNSELING/DISCUSSION: Status: RESOLVED | Noted: 2020-07-19 | Resolved: 2022-12-06

## 2022-12-06 PROBLEM — J96.01 ACUTE RESPIRATORY FAILURE WITH HYPOXIA (HCC): Status: RESOLVED | Noted: 2020-07-17 | Resolved: 2022-12-06

## 2022-12-06 PROBLEM — Z87.828 HISTORY OF TRAUMATIC HEAD INJURY: Status: ACTIVE | Noted: 2022-12-06

## 2022-12-06 PROBLEM — I10 PRIMARY HYPERTENSION: Status: ACTIVE | Noted: 2022-12-06

## 2022-12-06 PROBLEM — Z87.891 FORMER SMOKER: Status: ACTIVE | Noted: 2022-12-06

## 2022-12-06 PROBLEM — J69.0 ASPIRATION PNEUMONIA (HCC): Status: RESOLVED | Noted: 2020-07-27 | Resolved: 2022-12-06

## 2023-01-09 PROBLEM — E78.6 HYPERLIPIDEMIA WITH LOW HDL: Status: ACTIVE | Noted: 2023-01-09

## 2023-01-09 PROBLEM — R94.4 DECREASED GFR: Status: ACTIVE | Noted: 2023-01-09

## 2023-01-09 PROBLEM — E78.5 HYPERLIPIDEMIA WITH LOW HDL: Status: ACTIVE | Noted: 2023-01-09

## 2023-01-09 PROBLEM — E55.9 VITAMIN D DEFICIENCY: Status: ACTIVE | Noted: 2023-01-09

## 2023-02-10 PROBLEM — R94.4 DECREASED GFR: Status: RESOLVED | Noted: 2023-01-09 | Resolved: 2023-02-10

## 2023-02-10 PROBLEM — I50.32 CHRONIC DIASTOLIC CONGESTIVE HEART FAILURE (HCC): Status: ACTIVE | Noted: 2023-02-10

## 2023-02-10 PROBLEM — Z87.891 FORMER SMOKER: Status: RESOLVED | Noted: 2022-12-06 | Resolved: 2023-02-10

## 2023-02-10 PROBLEM — R04.0 EPISTAXIS: Status: ACTIVE | Noted: 2023-02-10

## 2023-02-10 PROBLEM — I50.22 CHRONIC SYSTOLIC CONGESTIVE HEART FAILURE (HCC): Status: RESOLVED | Noted: 2022-12-06 | Resolved: 2023-02-10

## 2023-04-26 PROBLEM — H61.23 BILATERAL IMPACTED CERUMEN: Status: ACTIVE | Noted: 2023-04-26

## 2023-09-18 PROBLEM — R04.0 EPISTAXIS: Status: RESOLVED | Noted: 2023-02-10 | Resolved: 2023-09-18

## 2023-10-31 PROBLEM — L60.2 ONYCHOGRYPHOSIS: Status: ACTIVE | Noted: 2023-10-31

## 2023-12-05 NOTE — PROGRESS NOTES
Kan Sibley MD Ututalum, Teresa, RN  They didn't draw PTHrP or 1,25 vitamin D      PTHrP, in process    Vitamin D1,25 from 10/9/23 was 24.       Monitor summary:    Sinus rhythm with widened QRS complex, Dr. Carson aware.    .20/.12/.40

## 2023-12-11 PROBLEM — R73.02 IMPAIRED GLUCOSE TOLERANCE: Status: ACTIVE | Noted: 2023-12-11

## 2023-12-11 PROBLEM — N18.31 STAGE 3A CHRONIC KIDNEY DISEASE: Status: ACTIVE | Noted: 2023-12-11

## 2023-12-19 ENCOUNTER — HOSPITAL ENCOUNTER (OUTPATIENT)
Dept: LAB | Facility: MEDICAL CENTER | Age: 73
End: 2023-12-19
Attending: PHYSICIAN ASSISTANT
Payer: MEDICARE

## 2023-12-19 DIAGNOSIS — I10 PRIMARY HYPERTENSION: ICD-10-CM

## 2023-12-19 DIAGNOSIS — R73.02 IMPAIRED GLUCOSE TOLERANCE: ICD-10-CM

## 2023-12-19 DIAGNOSIS — E55.9 VITAMIN D DEFICIENCY: ICD-10-CM

## 2023-12-19 DIAGNOSIS — N18.31 STAGE 3A CHRONIC KIDNEY DISEASE: ICD-10-CM

## 2023-12-19 LAB
25(OH)D3 SERPL-MCNC: 65 NG/ML (ref 30–100)
ALBUMIN SERPL BCP-MCNC: 4.4 G/DL (ref 3.2–4.9)
ALBUMIN/GLOB SERPL: 1.6 G/DL
ALP SERPL-CCNC: 79 U/L (ref 30–99)
ALT SERPL-CCNC: 8 U/L (ref 2–50)
ANION GAP SERPL CALC-SCNC: 9 MMOL/L (ref 7–16)
AST SERPL-CCNC: 10 U/L (ref 12–45)
BASOPHILS # BLD AUTO: 0.7 % (ref 0–1.8)
BASOPHILS # BLD: 0.06 K/UL (ref 0–0.12)
BILIRUB SERPL-MCNC: 0.7 MG/DL (ref 0.1–1.5)
BUN SERPL-MCNC: 19 MG/DL (ref 8–22)
CALCIUM ALBUM COR SERPL-MCNC: 9.3 MG/DL (ref 8.5–10.5)
CALCIUM SERPL-MCNC: 9.6 MG/DL (ref 8.5–10.5)
CHLORIDE SERPL-SCNC: 102 MMOL/L (ref 96–112)
CO2 SERPL-SCNC: 31 MMOL/L (ref 20–33)
CREAT SERPL-MCNC: 1.22 MG/DL (ref 0.5–1.4)
EOSINOPHIL # BLD AUTO: 0.15 K/UL (ref 0–0.51)
EOSINOPHIL NFR BLD: 1.6 % (ref 0–6.9)
ERYTHROCYTE [DISTWIDTH] IN BLOOD BY AUTOMATED COUNT: 48.7 FL (ref 35.9–50)
EST. AVERAGE GLUCOSE BLD GHB EST-MCNC: 126 MG/DL
GFR SERPLBLD CREATININE-BSD FMLA CKD-EPI: 62 ML/MIN/1.73 M 2
GLOBULIN SER CALC-MCNC: 2.7 G/DL (ref 1.9–3.5)
GLUCOSE SERPL-MCNC: 91 MG/DL (ref 65–99)
HBA1C MFR BLD: 6 % (ref 4–5.6)
HCT VFR BLD AUTO: 52.6 % (ref 42–52)
HGB BLD-MCNC: 16.8 G/DL (ref 14–18)
IMM GRANULOCYTES # BLD AUTO: 0.03 K/UL (ref 0–0.11)
IMM GRANULOCYTES NFR BLD AUTO: 0.3 % (ref 0–0.9)
LYMPHOCYTES # BLD AUTO: 2 K/UL (ref 1–4.8)
LYMPHOCYTES NFR BLD: 21.7 % (ref 22–41)
MCH RBC QN AUTO: 30.9 PG (ref 27–33)
MCHC RBC AUTO-ENTMCNC: 31.9 G/DL (ref 32.3–36.5)
MCV RBC AUTO: 96.9 FL (ref 81.4–97.8)
MONOCYTES # BLD AUTO: 0.67 K/UL (ref 0–0.85)
MONOCYTES NFR BLD AUTO: 7.3 % (ref 0–13.4)
NEUTROPHILS # BLD AUTO: 6.31 K/UL (ref 1.82–7.42)
NEUTROPHILS NFR BLD: 68.4 % (ref 44–72)
NRBC # BLD AUTO: 0 K/UL
NRBC BLD-RTO: 0 /100 WBC (ref 0–0.2)
PLATELET # BLD AUTO: 168 K/UL (ref 164–446)
PMV BLD AUTO: 11.5 FL (ref 9–12.9)
POTASSIUM SERPL-SCNC: 4.8 MMOL/L (ref 3.6–5.5)
PROT SERPL-MCNC: 7.1 G/DL (ref 6–8.2)
RBC # BLD AUTO: 5.43 M/UL (ref 4.7–6.1)
SODIUM SERPL-SCNC: 142 MMOL/L (ref 135–145)
WBC # BLD AUTO: 9.2 K/UL (ref 4.8–10.8)

## 2023-12-19 PROCEDURE — 83036 HEMOGLOBIN GLYCOSYLATED A1C: CPT | Mod: GA

## 2023-12-19 PROCEDURE — 80053 COMPREHEN METABOLIC PANEL: CPT

## 2023-12-19 PROCEDURE — 82306 VITAMIN D 25 HYDROXY: CPT

## 2023-12-19 PROCEDURE — 85025 COMPLETE CBC W/AUTO DIFF WBC: CPT

## 2023-12-19 PROCEDURE — 36415 COLL VENOUS BLD VENIPUNCTURE: CPT

## 2024-01-03 ENCOUNTER — HOSPITAL ENCOUNTER (OUTPATIENT)
Dept: LAB | Facility: MEDICAL CENTER | Age: 74
End: 2024-01-03
Attending: PHYSICIAN ASSISTANT
Payer: MEDICARE

## 2024-01-03 DIAGNOSIS — Z20.1 HISTORY OF EXPOSURE TO TUBERCULOSIS: ICD-10-CM

## 2024-01-03 PROCEDURE — 36415 COLL VENOUS BLD VENIPUNCTURE: CPT

## 2024-01-03 PROCEDURE — 86480 TB TEST CELL IMMUN MEASURE: CPT

## 2024-01-04 LAB
GAMMA INTERFERON BACKGROUND BLD IA-ACNC: 0.04 IU/ML
M TB IFN-G BLD-IMP: NEGATIVE
M TB IFN-G CD4+ BCKGRND COR BLD-ACNC: 0 IU/ML
MITOGEN IGNF BCKGRD COR BLD-ACNC: >10 IU/ML
QFT TB2 - NIL TBQ2: 0 IU/ML

## 2024-01-23 PROBLEM — R73.01 IMPAIRED FASTING GLUCOSE: Status: ACTIVE | Noted: 2023-12-11

## 2024-01-23 PROBLEM — N18.2 STAGE 2 CHRONIC KIDNEY DISEASE: Status: ACTIVE | Noted: 2023-12-11

## 2024-01-30 ENCOUNTER — HOSPITAL ENCOUNTER (OUTPATIENT)
Dept: LAB | Facility: MEDICAL CENTER | Age: 74
End: 2024-01-30
Attending: PHYSICIAN ASSISTANT
Payer: MEDICARE

## 2024-01-30 DIAGNOSIS — E78.6 HYPERLIPIDEMIA WITH LOW HDL: ICD-10-CM

## 2024-01-30 DIAGNOSIS — E78.5 HYPERLIPIDEMIA WITH LOW HDL: ICD-10-CM

## 2024-01-30 LAB
CHOLEST SERPL-MCNC: 132 MG/DL (ref 100–199)
FASTING STATUS PATIENT QL REPORTED: NORMAL
HDLC SERPL-MCNC: 43 MG/DL
LDLC SERPL CALC-MCNC: 73 MG/DL
TRIGL SERPL-MCNC: 82 MG/DL (ref 0–149)

## 2024-01-30 PROCEDURE — 80061 LIPID PANEL: CPT

## 2024-01-30 PROCEDURE — 36415 COLL VENOUS BLD VENIPUNCTURE: CPT

## 2024-05-16 PROBLEM — I50.9 CHF (CONGESTIVE HEART FAILURE) (HCC): Status: ACTIVE | Noted: 2024-05-16

## 2024-05-16 PROBLEM — R73.03 PREDIABETES: Status: ACTIVE | Noted: 2024-05-16

## 2024-05-16 PROBLEM — I50.32 CHRONIC DIASTOLIC CONGESTIVE HEART FAILURE (HCC): Status: RESOLVED | Noted: 2023-02-10 | Resolved: 2024-05-16

## 2024-07-29 NOTE — PALLIATIVE CARE
Palliative Care follow-up  Call from son Taurus to update this RN that his uncle if flying from [place] to see Johann before the transition to . He wanted to be sure there were no restrictions following air travel that would prohibit his uncle from visiting. PC reminded him of the need for masks and not to visit if he's unwell or been around someone who's been sick. He confirmed these are not issues and his uncle will have a mask. He is planning to arrive tomorrow from [place]. Taurus was encouraged to call with any other questions/needs.      Plan: support and assist with POC    Thank you for allowing Palliative Care to support this patient and family. Contact [phone] for additional assistance, change in patient status, or with any questions/concerns.    [de-identified] : WC - DOI 87/2023  ROBERTA BRANDT is a 52 year-old man presenting for a NPV for a history of chronic low back pain.   Prior treatment: Physical therapy x3 months Patient has participated and failed at least 6 weeks of conservative therapy including physical therapy and a prescribed home exercise program as well as 6 weeks of activity modifications, including heat, ice, rest, and over the counter medications.  Plan: 1) MRI lumbar spine images reviewed with the patient. 2) Schedule RIGHT L5-S1, S1 TFESI w/o MAC. Patient is on [facility] - Cardiologist Dr. Alvin Murrieta [phone]. Will need permission to hold prior to procedure. The procedure was explained to the patient in detail. Reviewed risks, benefits, and alternatives with the patient. Some risks discussed included temporary increase in pain, bleeding, infection, and side effects from steroids. The patient expressed understanding and would like to proceed. Patient also take mounjaro, but will do this w/o MAC.  3) Continue physical therapy 4) RTC post procedure  Patient has temporary partial disability, he has not stopped working.

## 2025-01-03 ENCOUNTER — HOSPITAL ENCOUNTER (OUTPATIENT)
Dept: LAB | Facility: MEDICAL CENTER | Age: 75
End: 2025-01-03
Payer: MEDICARE

## 2025-01-03 DIAGNOSIS — Z13.9 SCREENING DUE: ICD-10-CM

## 2025-01-03 PROCEDURE — 86480 TB TEST CELL IMMUN MEASURE: CPT | Mod: GY

## 2025-01-03 PROCEDURE — 36415 COLL VENOUS BLD VENIPUNCTURE: CPT

## 2025-01-06 LAB
GAMMA INTERFERON BACKGROUND BLD IA-ACNC: 0.02 IU/ML
M TB IFN-G BLD-IMP: NEGATIVE
M TB IFN-G CD4+ BCKGRND COR BLD-ACNC: 0 IU/ML
MITOGEN IGNF BCKGRD COR BLD-ACNC: >10 IU/ML
QFT TB2 - NIL TBQ2: 0 IU/ML

## 2025-03-20 ENCOUNTER — APPOINTMENT (OUTPATIENT)
Dept: RADIOLOGY | Facility: MEDICAL CENTER | Age: 75
End: 2025-03-20
Attending: STUDENT IN AN ORGANIZED HEALTH CARE EDUCATION/TRAINING PROGRAM
Payer: MEDICARE

## 2025-03-20 ENCOUNTER — HOSPITAL ENCOUNTER (EMERGENCY)
Facility: MEDICAL CENTER | Age: 75
End: 2025-03-21
Attending: STUDENT IN AN ORGANIZED HEALTH CARE EDUCATION/TRAINING PROGRAM
Payer: MEDICARE

## 2025-03-20 VITALS
TEMPERATURE: 98.1 F | WEIGHT: 200 LBS | RESPIRATION RATE: 18 BRPM | SYSTOLIC BLOOD PRESSURE: 117 MMHG | BODY MASS INDEX: 26.51 KG/M2 | OXYGEN SATURATION: 92 % | HEIGHT: 73 IN | DIASTOLIC BLOOD PRESSURE: 60 MMHG | HEART RATE: 71 BPM

## 2025-03-20 DIAGNOSIS — S01.81XA FACIAL LACERATION, INITIAL ENCOUNTER: ICD-10-CM

## 2025-03-20 DIAGNOSIS — I60.9 SAH (SUBARACHNOID HEMORRHAGE) (HCC): ICD-10-CM

## 2025-03-20 DIAGNOSIS — F03.90 DEMENTIA, UNSPECIFIED DEMENTIA SEVERITY, UNSPECIFIED DEMENTIA TYPE, UNSPECIFIED WHETHER BEHAVIORAL, PSYCHOTIC, OR MOOD DISTURBANCE OR ANXIETY (HCC): ICD-10-CM

## 2025-03-20 DIAGNOSIS — Z66 DNR (DO NOT RESUSCITATE): ICD-10-CM

## 2025-03-20 PROBLEM — T14.90XA TRAUMA: Status: ACTIVE | Noted: 2025-03-20

## 2025-03-20 PROBLEM — Z78.9 POLST (PHYSICIAN ORDERS FOR LIFE-SUSTAINING TREATMENT): Chronic | Status: ACTIVE | Noted: 2025-03-20

## 2025-03-20 PROBLEM — S01.119A EYEBROW LACERATION: Status: ACTIVE | Noted: 2025-03-20

## 2025-03-20 LAB
ALBUMIN SERPL BCP-MCNC: 4.1 G/DL (ref 3.2–4.9)
ALBUMIN/GLOB SERPL: 1.5 G/DL
ALP SERPL-CCNC: 61 U/L (ref 30–99)
ALT SERPL-CCNC: 8 U/L (ref 2–50)
ANION GAP SERPL CALC-SCNC: 12 MMOL/L (ref 7–16)
APTT PPP: 30.7 SEC (ref 24.7–36)
AST SERPL-CCNC: 18 U/L (ref 12–45)
BASOPHILS # BLD AUTO: 0.7 % (ref 0–1.8)
BASOPHILS # BLD: 0.05 K/UL (ref 0–0.12)
BILIRUB SERPL-MCNC: 0.5 MG/DL (ref 0.1–1.5)
BUN SERPL-MCNC: 17 MG/DL (ref 8–22)
CALCIUM ALBUM COR SERPL-MCNC: 9 MG/DL (ref 8.5–10.5)
CALCIUM SERPL-MCNC: 9.1 MG/DL (ref 8.5–10.5)
CFT BLD TEG: 5.2 MIN (ref 4.6–9.1)
CFT P HPASE BLD TEG: 7.3 MIN (ref 4.3–8.3)
CHLORIDE SERPL-SCNC: 100 MMOL/L (ref 96–112)
CLOT ANGLE BLD TEG: 73.5 DEGREES (ref 63–78)
CO2 SERPL-SCNC: 26 MMOL/L (ref 20–33)
CREAT SERPL-MCNC: 1.1 MG/DL (ref 0.5–1.4)
CT.EXTRINSIC BLD ROTEM: 1.3 MIN (ref 0.8–2.1)
EKG IMPRESSION: NORMAL
EOSINOPHIL # BLD AUTO: 0.06 K/UL (ref 0–0.51)
EOSINOPHIL NFR BLD: 0.8 % (ref 0–6.9)
ERYTHROCYTE [DISTWIDTH] IN BLOOD BY AUTOMATED COUNT: 48.9 FL (ref 35.9–50)
GFR SERPLBLD CREATININE-BSD FMLA CKD-EPI: 70 ML/MIN/1.73 M 2
GLOBULIN SER CALC-MCNC: 2.7 G/DL (ref 1.9–3.5)
GLUCOSE SERPL-MCNC: 96 MG/DL (ref 65–99)
HCT VFR BLD AUTO: 46.7 % (ref 42–52)
HGB BLD-MCNC: 15.4 G/DL (ref 14–18)
IMM GRANULOCYTES # BLD AUTO: 0.02 K/UL (ref 0–0.11)
IMM GRANULOCYTES NFR BLD AUTO: 0.3 % (ref 0–0.9)
INR PPP: 1.06 (ref 0.87–1.13)
LYMPHOCYTES # BLD AUTO: 1.98 K/UL (ref 1–4.8)
LYMPHOCYTES NFR BLD: 27.3 % (ref 22–41)
MCF BLD TEG: 54.7 MM (ref 52–69)
MCF.PLATELET INHIB BLD ROTEM: 16.5 MM (ref 15–32)
MCH RBC QN AUTO: 31.8 PG (ref 27–33)
MCHC RBC AUTO-ENTMCNC: 33 G/DL (ref 32.3–36.5)
MCV RBC AUTO: 96.3 FL (ref 81.4–97.8)
MONOCYTES # BLD AUTO: 0.38 K/UL (ref 0–0.85)
MONOCYTES NFR BLD AUTO: 5.2 % (ref 0–13.4)
NEUTROPHILS # BLD AUTO: 4.75 K/UL (ref 1.82–7.42)
NEUTROPHILS NFR BLD: 65.7 % (ref 44–72)
NRBC # BLD AUTO: 0 K/UL
NRBC BLD-RTO: 0 /100 WBC (ref 0–0.2)
PA AA BLD-ACNC: 43.7 % (ref 0–11)
PA ADP BLD-ACNC: 15.2 % (ref 0–17)
PLATELET # BLD AUTO: 147 K/UL (ref 164–446)
PMV BLD AUTO: 10.5 FL (ref 9–12.9)
POTASSIUM SERPL-SCNC: 3.8 MMOL/L (ref 3.6–5.5)
PROT SERPL-MCNC: 6.8 G/DL (ref 6–8.2)
PROTHROMBIN TIME: 13.9 SEC (ref 12–14.6)
RBC # BLD AUTO: 4.85 M/UL (ref 4.7–6.1)
SODIUM SERPL-SCNC: 138 MMOL/L (ref 135–145)
TEG ALGORITHM TGALG: ABNORMAL
WBC # BLD AUTO: 7.2 K/UL (ref 4.8–10.8)

## 2025-03-20 PROCEDURE — 85384 FIBRINOGEN ACTIVITY: CPT

## 2025-03-20 PROCEDURE — 36415 COLL VENOUS BLD VENIPUNCTURE: CPT

## 2025-03-20 PROCEDURE — 96374 THER/PROPH/DIAG INJ IV PUSH: CPT

## 2025-03-20 PROCEDURE — 700117 HCHG RX CONTRAST REV CODE 255: Performed by: STUDENT IN AN ORGANIZED HEALTH CARE EDUCATION/TRAINING PROGRAM

## 2025-03-20 PROCEDURE — 85576 BLOOD PLATELET AGGREGATION: CPT | Mod: 91

## 2025-03-20 PROCEDURE — 85610 PROTHROMBIN TIME: CPT

## 2025-03-20 PROCEDURE — 700101 HCHG RX REV CODE 250: Performed by: STUDENT IN AN ORGANIZED HEALTH CARE EDUCATION/TRAINING PROGRAM

## 2025-03-20 PROCEDURE — 303747 HCHG EXTRA SUTURE

## 2025-03-20 PROCEDURE — 93005 ELECTROCARDIOGRAM TRACING: CPT | Mod: XE | Performed by: STUDENT IN AN ORGANIZED HEALTH CARE EDUCATION/TRAINING PROGRAM

## 2025-03-20 PROCEDURE — 70450 CT HEAD/BRAIN W/O DYE: CPT

## 2025-03-20 PROCEDURE — 85730 THROMBOPLASTIN TIME PARTIAL: CPT

## 2025-03-20 PROCEDURE — 304217 HCHG IRRIGATION SYSTEM

## 2025-03-20 PROCEDURE — 700111 HCHG RX REV CODE 636 W/ 250 OVERRIDE (IP): Mod: JZ | Performed by: STUDENT IN AN ORGANIZED HEALTH CARE EDUCATION/TRAINING PROGRAM

## 2025-03-20 PROCEDURE — 304999 HCHG REPAIR-SIMPLE/INTERMED LEVEL 1

## 2025-03-20 PROCEDURE — 99285 EMERGENCY DEPT VISIT HI MDM: CPT

## 2025-03-20 PROCEDURE — 72125 CT NECK SPINE W/O DYE: CPT

## 2025-03-20 PROCEDURE — 85347 COAGULATION TIME ACTIVATED: CPT

## 2025-03-20 PROCEDURE — 85025 COMPLETE CBC W/AUTO DIFF WBC: CPT

## 2025-03-20 PROCEDURE — 99282 EMERGENCY DEPT VISIT SF MDM: CPT | Performed by: SURGERY

## 2025-03-20 PROCEDURE — 80053 COMPREHEN METABOLIC PANEL: CPT

## 2025-03-20 PROCEDURE — 70496 CT ANGIOGRAPHY HEAD: CPT

## 2025-03-20 RX ORDER — LIDOCAINE HYDROCHLORIDE AND EPINEPHRINE 10; 10 MG/ML; UG/ML
5 INJECTION, SOLUTION INFILTRATION; PERINEURAL ONCE
Status: COMPLETED | OUTPATIENT
Start: 2025-03-20 | End: 2025-03-20

## 2025-03-20 RX ORDER — HALOPERIDOL 5 MG/ML
2.5 INJECTION INTRAMUSCULAR ONCE
Status: COMPLETED | OUTPATIENT
Start: 2025-03-20 | End: 2025-03-20

## 2025-03-20 RX ADMIN — IOHEXOL 70 ML: 350 INJECTION, SOLUTION INTRAVENOUS at 22:42

## 2025-03-20 RX ADMIN — LIDOCAINE HYDROCHLORIDE AND EPINEPHRINE 5 ML: 10; 10 INJECTION, SOLUTION INFILTRATION; PERINEURAL at 18:00

## 2025-03-20 RX ADMIN — HALOPERIDOL LACTATE 2.5 MG: 5 INJECTION, SOLUTION INTRAMUSCULAR at 18:06

## 2025-03-20 ASSESSMENT — FIBROSIS 4 INDEX: FIB4 SCORE: 1.58

## 2025-03-21 NOTE — ASSESSMENT & PLAN NOTE
5 mm focal high density area in the right frontal region, found to be a calcified blood vessel on CTA. Trace subarachnoid hemorrhage seen in the right parietal convexity.  mBIG 1  Non-operative management.  Observation in the ER with neuro-checks every 2-hours for 6-hours, can be discharged if remains at neurologic baseline.  Outpatient followup with Nova Reynolds MD. Neurosurgeon. [facility].

## 2025-03-21 NOTE — CONSULTS
CHIEF COMPLAINT: Headache.     HISTORY OF PRESENT ILLNESS: The patient is a 74 year-old White elderly man who was injured in a fall. The patient suffered a mechanical ground-level fall. He had an unknown loss of consciousness. The patient's family denies any chronic anticoagulation or antiplatelet medications. He complains of pain in his head. He was going to [place] with his family when fell in the parking lot striking his head. He has a history of dementia, he is at his baseline according to his son who is present at bedside. CT head demonstrated a trace right parietal subarachnoid hemorrhage as well as another area concerning for an additional bleed vs calcified blood vessel. CTA demonstrated this area as blood vessel.    TRIAGE CATEGORY: The patient was triaged as a Non Trauma Activation. An expeditious primary and secondary survey with required adjuncts was conducted. See Trauma Narrator for full details.    PAST MEDICAL HISTORY:  has a past medical history of Acute respiratory failure with hypoxia ([facility]) (7/17/2020), KORIN (acute kidney injury) ([facility]) (7/17/2020), Aspiration pneumonia ([facility]) (7/27/2020), Aspiration pneumonia (HCC) (7/27/2020), Chronic systolic congestive heart failure (HCC) (12/6/2022), ETOH abuse, Goals of care, counseling/discussion (7/19/2020), and Primary hypertension (12/6/2022).    He has no past medical history of Anesthesia, Angina, Arrhythmia, Arthritis, ASTHMA, Backpain, Bronchitis, Cancer (HCC), CATARACT, COPD, Diabetes, Dialysis, Glaucoma, Heart murmur, Heart valve disease, Indigestion, Infectious disease, Jaundice, Myocardial infarct (HCC), Other specified symptom associated with female genital organs, Pacemaker, Personal history of venous thrombosis and embolism, Psychiatric problem, Rheumatic fever, Seizure (HCC), Stroke (HCC), Unspecified disorder of thyroid, Unspecified hemorrhagic conditions, or Unspecified urinary incontinence.    PAST SURGICAL HISTORY:  has a past  "surgical history that includes orbital fracture orif (7/27/2009); nasal fracture reduction open (7/27/2009); zygomatic arch orif (7/27/2009); maxilla fracture orif (7/27/2009); and nerve repair (7/27/2009).    ALLERGIES: No Known Allergies    CURRENT MEDICATIONS:   Home Medications    **Home medications have not yet been reviewed for this encounter**       Audit from Redirected Encounters    **Home medications have not yet been reviewed for this encounter**       FAMILY HISTORY: family history is not on file.    SOCIAL HISTORY:  reports that he has quit smoking. His smoking use included cigarettes. He has a 41 pack-year smoking history. He has never used smokeless tobacco. He reports that he does not currently use alcohol. He reports that he does not use drugs.    REVIEW OF SYSTEMS: Comprehensive review of systems is negative with the exception of the aforementioned HPI, PMH, and PSH bullets in accordance with CMS guidelines.    PHYSICAL EXAMINATION:      Vital Signs: /60   Pulse 71   Temp 36.7 °C (98.1 °F) (Temporal)   Resp 18   Ht 1.854 m (6' 1\")   Wt 90.7 kg (200 lb)   SpO2 92%   Physical Exam  Vitals reviewed.   Constitutional:       General: He is not in acute distress.     Appearance: He is not toxic-appearing.   HENT:      Head: Normocephalic.      Comments: 4 cm right eyebrow laceration, well-approximated with sutures.     Right Ear: External ear normal.      Left Ear: External ear normal.      Mouth/Throat:      Mouth: Mucous membranes are moist.      Pharynx: Oropharynx is clear.   Eyes:      General: No scleral icterus.     Pupils: Pupils are equal, round, and reactive to light.   Cardiovascular:      Rate and Rhythm: Normal rate.   Pulmonary:      Effort: Pulmonary effort is normal. No respiratory distress.      Breath sounds: Normal breath sounds.   Abdominal:      General: There is no distension.      Palpations: Abdomen is soft.      Tenderness: There is no abdominal tenderness. There is " no guarding or rebound.   Genitourinary:     Comments: Pelvis stable.  Musculoskeletal:      Cervical back: Normal range of motion and neck supple. No deformity or tenderness.      Thoracic back: No deformity or tenderness.      Lumbar back: No deformity or tenderness.   Skin:     General: Skin is warm and dry.      Capillary Refill: Capillary refill takes less than 2 seconds.      Coloration: Skin is not jaundiced.   Neurological:      General: No focal deficit present.      Mental Status: He is alert. He is disoriented.      GCS: GCS eye subscore is 4. GCS verbal subscore is 5.      Comments: Oriented to person, baseline per family.         LABORATORY VALUES:   Recent Labs     03/20/25  1800   WBC 7.2   RBC 4.85   HEMOGLOBIN 15.4   HEMATOCRIT 46.7   MCV 96.3   MCH 31.8   MCHC 33.0   RDW 48.9   PLATELETCT 147*   MPV 10.5     Recent Labs     03/20/25  1800   SODIUM 138   POTASSIUM 3.8   CHLORIDE 100   CO2 26   GLUCOSE 96   BUN 17   CREATININE 1.10   CALCIUM 9.1     Recent Labs     03/20/25  1800 03/20/25  1853   ASTSGOT 18  --    ALTSGPT 8  --    TBILIRUBIN 0.5  --    ALKPHOSPHAT 61  --    GLOBULIN 2.7  --    INR  --  1.06     Recent Labs     03/20/25  1853   APTT 30.7   INR 1.06        IMAGING:   CT-CTA HEAD WITH & W/O-POST PROCESS   Final Result         1.  No large vessel occlusion or aneurysm identified.   2.  Area along the left parietal convexity concerning for hemorrhage corresponds with vessel without visualized occlusion.      CT-CSPINE WITHOUT PLUS RECONS   Final Result      1. No acute osseous injury of the cervical spine.   2. Multilevel cervical spondylosis.      CT-HEAD W/O   Final Result         1. There is a 5 mm focal high density area in the right frontal region. The differential includes focal subarachnoid hemorrhage or a thrombosed cortical vein. MRI with contrast is recommended for further evaluation.      2. Trace subarachnoid hemorrhage seen in the right parietal convexity.               Based  solely on CT findings, the brain injury guideline category is mBIG 1.      SDH < 4mm   IPH < 4mm   SAH < 3 sulci and < 1mm      The original BIG retrospective analysis found radiographic progression in 0% of BIG 1 patients and 2.6% BIG 2.            Preliminary findings texted to Dr. ADRIANA ESPINAL in the Emergency Department via Voalte on 3/20/2025 6:07 PM          ASSESSMENT AND PLAN:     Subarachnoid hemorrhage (HCC)  5 mm focal high density area in the right frontal region, found to be a calcified blood vessel on CTA. Trace subarachnoid hemorrhage seen in the right parietal convexity.  mBIG 1  Non-operative management.  Observation in the ER with neuro-checks every 2-hours for 6-hours, can be discharged if remains at neurologic baseline.  Outpatient followup with Nova Reynolds MD. Neurosurgeon. [facility].    Primary hypertension  Chronic condition treated with losarten.  Resumption pending med rec.    Hyperlipidemia with low HDL  Chronic condition treated with lipitor.  Resumption pending med rec.    POLST (Physician Orders for Life-Sustaining Treatment)  Active POLST on file indicating patient's wishes for DNR/DNI.    Eyebrow laceration  Repaired in ED with nylon suture.     Trauma  Witnessed GLF in [place] [place] [employer] [place] parking lot..  TBI Alert.  Ehsan Lucas MD. Trauma Surgery.    DISPOSITION: Discharge to home.  Call or return to the Emergency Department for recurrent or worsening symptoms.         ____________________________________     Geremias Herrera M.D.    DD: 3/20/2025  11:18 PM  Injury Scoring Scale

## 2025-03-21 NOTE — ED NOTES
Pt boosted up in bed, pt son bedside. Pt has clean diaper and sheets in place. Pt resting with bed alarm in place.

## 2025-03-21 NOTE — ED NOTES
Pt discharged to home. Discharge paperwork provided. Education provided by ERP. Reinforced discharge instructions.  Pt was given follow up instructions   Pt verbalized understanding of all instructions for discharge.   Patient went out of the ER via WC ., alert and oriented, with all belongings.     Son to drive pt home

## 2025-03-21 NOTE — ED PROVIDER NOTES
"ER Provider Note    Scribed for Martha Sebastian D.O. by Sheldon Goodwin. 3/20/2025  5:40 PM    Primary Care Provider: Nicolasa Ramsay N.P.    CHIEF COMPLAINT   Chief Complaint   Patient presents with    GLF     Witnessed fall in [place] [place] [employer] [place] parking lot. Laceration to right eyebrow.      EXTERNAL RECORDS REVIEWED  Outpatient Notes Reviewed office visit from 1/14/25 which reports that the patient has a history of AAA with recent progression to 4.3 x 4.7 cm, stage 2 chronic kidney disease, and HTN with no chest pain, dizziness, or headaches.    HPI/ROS  LIMITATION TO HISTORY   Select: : None  OUTSIDE HISTORIAN(S):  EMS provided history for the patient as seen below.    Johann Godoy is a 74 y.o. male who presents to the ED by EMS for evaluation following a ground level fall onset prior to arrival. EMS reports that the patient was going to [place] with his family when he fell in the parking lot, hitting his head from the fall. They note that his family was unsure if he lost consciousness during the event. EMS states that the patient has been repetitive since they arrived on scene saying \"this is bullshit, this is baloney\" which the family confirmed was baseline for him secondary to a history of dementia. They confirm a 1 inch laceration above his right eye with no other injuries and stable vitals during transport. EMS denies use of blood thinners.    PAST MEDICAL HISTORY  Past Medical History:   Diagnosis Date    Acute respiratory failure with hypoxia (HCC) 7/17/2020    KORIN (acute kidney injury) (HCC) 7/17/2020    Aspiration pneumonia (HCC) 7/27/2020    Aspiration pneumonia (HCC) 7/27/2020    Chronic systolic congestive heart failure (HCC) 12/6/2022    ETOH abuse     Goals of care, counseling/discussion 7/19/2020    Primary hypertension 12/6/2022     SURGICAL HISTORY  Past Surgical History:   Procedure Laterality Date    ORBITAL FRACTURE ORIF  7/27/2009    Performed by MONICA JACKSON at SURGERY " "[facility]    NASAL FRACTURE REDUCTION OPEN  7/27/2009    Performed by MONICA JACKSON at SURGERY [facility]    ZYGOMATIC ARCH ORIF  7/27/2009    Performed by MONICA JACKSON at SURGERY [facility]    MAXILLA FRACTURE ORIF  7/27/2009    Performed by MONICA JACKSON at SURGERY [facility]    NERVE REPAIR  7/27/2009    Performed by MONICA JACKSON at SURGERY [facility]     FAMILY HISTORY  No family history noted.    SOCIAL HISTORY   reports that he has quit smoking. His smoking use included cigarettes. He has a 41 pack-year smoking history. He has never used smokeless tobacco. He reports that he does not currently use alcohol. He reports that he does not use drugs.    CURRENT MEDICATIONS  Previous Medications    ATORVASTATIN (LIPITOR) 10 MG TAB    Take 1 Tablet by mouth every evening.    CHOLECALCIFEROL (VITAMIN D) 125 MCG (5000 UT) CAP    Take 5,000 Int'l Units/day by mouth every day.    LOSARTAN (COZAAR) 50 MG TAB    Take 50 mg by mouth every day.     ALLERGIES  Patient has no known allergies.    PHYSICAL EXAM  /69   Pulse 65   Temp 36.7 °C (98.1 °F) (Temporal)   Resp 18   Ht 1.854 m (6' 1\")   Wt 90.7 kg (200 lb)   SpO2 93%   BMI 26.39 kg/m²   Pulse oximetry interpretation: I interpret the pulse oximetry as normal.  Constitutional: Awake and alert. No acute distress. Mildly verbally aggressive behavior.   Head: NC. 4 cm laceration to right eyebrow.   HEENT: Normal Conjunctiva.  PERRLA  Neck: Grossly normal range of motion. Airway midline.  Cardiovascular: Normal heart rate, Normal rhythm.  Thorax & Lungs: No respiratory distress. Clear to Auscultation bilaterally.  Abdomen: Normal inspection. Nontender. Nondistended  Skin: No obvious rash.  Back: No tenderness, No CVA tenderness.   Musculoskeletal: No obvious deformity. Moves all extremities Well.  Neurologic: A&Ox1. No focal deficits. Repetitive.  Later son reports he is at his baseline  Psychiatric: Mood and affect are " appropriate for situation.    DIAGNOSTIC STUDIES    EKG/LABS  Results for orders placed or performed during the hospital encounter of 03/20/25   CBC WITH DIFFERENTIAL    Collection Time: 03/20/25  6:00 PM   Result Value Ref Range    WBC 7.2 4.8 - 10.8 K/uL    RBC 4.85 4.70 - 6.10 M/uL    Hemoglobin 15.4 14.0 - 18.0 g/dL    Hematocrit 46.7 42.0 - 52.0 %    MCV 96.3 81.4 - 97.8 fL    MCH 31.8 27.0 - 33.0 pg    MCHC 33.0 32.3 - 36.5 g/dL    RDW 48.9 35.9 - 50.0 fL    Platelet Count 147 (L) 164 - 446 K/uL    MPV 10.5 9.0 - 12.9 fL    Neutrophils-Polys 65.70 44.00 - 72.00 %    Lymphocytes 27.30 22.00 - 41.00 %    Monocytes 5.20 0.00 - 13.40 %    Eosinophils 0.80 0.00 - 6.90 %    Basophils 0.70 0.00 - 1.80 %    Immature Granulocytes 0.30 0.00 - 0.90 %    Nucleated RBC 0.00 0.00 - 0.20 /100 WBC    Neutrophils (Absolute) 4.75 1.82 - 7.42 K/uL    Lymphs (Absolute) 1.98 1.00 - 4.80 K/uL    Monos (Absolute) 0.38 0.00 - 0.85 K/uL    Eos (Absolute) 0.06 0.00 - 0.51 K/uL    Baso (Absolute) 0.05 0.00 - 0.12 K/uL    Immature Granulocytes (abs) 0.02 0.00 - 0.11 K/uL    NRBC (Absolute) 0.00 K/uL   COMP METABOLIC PANEL    Collection Time: 03/20/25  6:00 PM   Result Value Ref Range    Sodium 138 135 - 145 mmol/L    Potassium 3.8 3.6 - 5.5 mmol/L    Chloride 100 96 - 112 mmol/L    Co2 26 20 - 33 mmol/L    Anion Gap 12.0 7.0 - 16.0    Glucose 96 65 - 99 mg/dL    Bun 17 8 - 22 mg/dL    Creatinine 1.10 0.50 - 1.40 mg/dL    Calcium 9.1 8.5 - 10.5 mg/dL    Correct Calcium 9.0 8.5 - 10.5 mg/dL    AST(SGOT) 18 12 - 45 U/L    ALT(SGPT) 8 2 - 50 U/L    Alkaline Phosphatase 61 30 - 99 U/L    Total Bilirubin 0.5 0.1 - 1.5 mg/dL    Albumin 4.1 3.2 - 4.9 g/dL    Total Protein 6.8 6.0 - 8.2 g/dL    Globulin 2.7 1.9 - 3.5 g/dL    A-G Ratio 1.5 g/dL   ESTIMATED GFR    Collection Time: 03/20/25  6:00 PM   Result Value Ref Range    GFR (CKD-EPI) 70 >60 mL/min/1.73 m 2   EKG    Collection Time: 03/20/25  6:12 PM   Result Value Ref Range    Report        [facility] Emergency Dept.    Test Date:  2025  Pt Name:    JUJU CARMONA                Department: ER  MRN:        [id number]                      Room:        19  Gender:     Male                         Technician: [id number]  :        [id number]                   Requested By:ADRIANA ESPINAL  Order #:    [id number]                    Reading MD:    Measurements  Intervals                                Axis  Rate:       73                           P:          45  PA:         191                          QRS:        104  QRSD:       148                          T:          19  QT:         452  QTc:        499    Interpretive Statements  Sinus rhythm  Probable left atrial enlargement  RBBB and LPFB  Artifact in lead(s) III,aVL,aVF,V1,V2,V3,V4,V5,V6  Compared to ECG 2020 01:00:17  No significant changes     PROTHROMBIN TIME (INR)    Collection Time: 25  6:53 PM   Result Value Ref Range    PT 13.9 12.0 - 14.6 sec    INR 1.06 0.87 - 1.13   APTT    Collection Time: 25  6:53 PM   Result Value Ref Range    APTT 30.7 24.7 - 36.0 sec     Sinus rhythm 73 bpm.  No acute ST or T wave changes.  Right bundle block.  I have independently interpreted this EKG and labs.    RADIOLOGY/PROCEDURES   The attending emergency physician has independently interpreted the diagnostic imaging associated with this visit and am waiting the final reading from the radiologist.   My preliminary interpretation is a follows: No midline shift    Radiologist interpretation:  CT-CTA HEAD WITH & W/O-POST PROCESS   Final Result         1.  No large vessel occlusion or aneurysm identified.   2.  Area along the left parietal convexity concerning for hemorrhage corresponds with vessel without visualized occlusion.      CT-CSPINE WITHOUT PLUS RECONS   Final Result      1. No acute osseous injury of the cervical spine.   2. Multilevel cervical spondylosis.      CT-HEAD W/O   Final Result         1. There is a 5  mm focal high density area in the right frontal region. The differential includes focal subarachnoid hemorrhage or a thrombosed cortical vein. MRI with contrast is recommended for further evaluation.      2. Trace subarachnoid hemorrhage seen in the right parietal convexity.               Based solely on CT findings, the brain injury guideline category is mBIG 1.      SDH < 4mm   IPH < 4mm   SAH < 3 sulci and < 1mm      The original BIG retrospective analysis found radiographic progression in 0% of BIG 1 patients and 2.6% BIG 2.            Preliminary findings texted to Dr. ADRIANA ESPINAL in the Emergency Department via Voalte on 3/20/2025 6:07 PM        Laceration Repair Procedure Note    Indication: Laceration    Procedure: The patient was placed in the appropriate position and anesthesia around the laceration was obtained by infiltration using 1% Lidocaine with epinephrine. The wound was minimally contaminated .The area was then irrigated with normal saline. The laceration was closed with 6-0 Nylon using interrupted sutures. There were no additional lacerations requiring repair. The wound area was then dressed with a sterile dressing.      Total repaired wound length: 4 cm.     Other Items: Suture count: 4    The patient tolerated the procedure well.    Complications: None      COURSE & MEDICAL DECISION MAKING     ASSESSMENT, COURSE AND PLAN  Care Narrative:   This is a 74-year-old male with advanced dementia and is DNR/DNI who fell on a [employer] parking lot hitting his head sustaining laceration to his forehead and arrives as a TBI activation  Afebrile and reassuring vitals  Alert and oriented x 1, reported his baseline, no focal deficits.  Initially unclear if he is on blood thinners or aspirin.  He is taken to the CT scanner immediately for TBI protocol  CT neck unremarkable.  CT head with finding of small parietal subarachnoid meeting big 1 criteria, he has an incidental 5 mm frontal hyperdensity that may  be blood or may be a thrombosed vein, this 1 would technically fit big 3 criteria.      6:13 PM - Spoke with radiology who informed me that there is a question of blood on CT. Patient was reevaluated at bedside. Family was now present and I informed them of this possible finding. They confirm DNR and I discuss the plan of care with them. Family verbalizes understanding and support. Paged trauma.    6:30 PM - I discussed the patient's case and the above findings with Dr. Lucas (Trauma), informing him that the patient was found to have a trace subarachnoid hemorrhage in the right parietal convexity.  Will consult with neurosurgery for the other finding question of 5 mm subarachnoid which would be grade 3.    6:43 PM - I discussed the patient's case and the above findings with Dr. Reynolds (Neurosurgery) who informed me after reviewing imaging herself that it would be beneficial to obtain CTA head.    8:23 PM - Patient was reevaluated at bedside and appears to be maintaining his condition. I spoke with family regarding my consultations and the plan of care for which they verbalized understanding and agreement.      10:37 PM - Spoke with CT at this time regarding expedition of his imaging.     23:15 final CTA read not demonstrating hemorrhage.  This was communicated to Dr. Reynolds who agrees.  Also reached out to trauma, Dr. Herrera for assessment of this patient fitting big 1 criteria as they needed an assessment by trauma prior to discharge.  We are now at the 6-hour roc from injury and anticipate patient can be discharged once cleared by trauma.    He will need suture removal in approximately 7 days.  Son at bedside is comfortable with this plan.    ADDITIONAL PROBLEM LIST    Facial laceration  Dementia  DNR/DNI    DISPOSITION AND DISCUSSIONS  I have discussed management of the patient with the following physicians and CARLOS's:  Dr. Lucas (Trauma) and Dr. Reynolds (Neurosurgery).    Discussion of management with other  QHP or appropriate source(s): Radiologist informed me of question of blood on CT.      Escalation of care considered, and ultimately not performed: Laboratory analysis, diagnostic imaging, and acute inpatient care management, however at this time, the patient is most appropriate for outpatient management. Big 1 criteria after additional imaging.  Patient screened for 6 hours based on protocol, trauma assessment, and now is fitting discharge disposition    Barriers to care at this time, including but not limited to: Patient lacks transportation .     Decision tools and prescription drugs considered including, but not limited to: NEXUS criteriaunable to exclude cspine imaging based on dementia .    FINAL DIANGOSIS  1. SAH (subarachnoid hemorrhage) (HCC)    2. Facial laceration, initial encounter    3. Dementia, unspecified dementia severity, unspecified dementia type, unspecified whether behavioral, psychotic, or mood disturbance or anxiety (HCC)    4. DNR (do not resuscitate)       Sheldon KAUR (Scribe), am scribing for, and in the presence of, Martha Sebastian D.O..    Electronically signed by: Sheldon Goodwin (Scribe), 3/20/2025    Martha KAUR D.O. personally performed the services described in this documentation, as scribed by Sheldon Goodwin in my presence, and it is both accurate and complete.     The note accurately reflects work and decisions made by me.  Martha Sebastian D.O.  3/20/2025  11:36 PM

## 2025-03-21 NOTE — ED NOTES
Bedside report to April RN. Pt on bed alarm, bed locked in lowest position side rails up x 2 call light within reach, pt on 1L NC oxygen, Pt on HR, oxygen, BP, and cardiac monitor. Pt son bedside.

## 2025-03-21 NOTE — ED TRIAGE NOTES
"Chief Complaint   Patient presents with    GLF     Witnessed fall in [employer] [place] parking lot. Laceration to right eyebrow.      Pt BIB EMS for above concern. Pt is demented at baseline (A&O1). Pt has a full thickness laceration to his right eyebrow. Pt is verbally aggressive. Pt denies complaints.     Ht 1.854 m (6' 1\")   Wt 90.7 kg (200 lb)     "

## 2025-03-28 ENCOUNTER — OFFICE VISIT (OUTPATIENT)
Dept: URGENT CARE | Facility: PHYSICIAN GROUP | Age: 75
End: 2025-03-28
Payer: MEDICARE

## 2025-03-28 VITALS
HEART RATE: 87 BPM | BODY MASS INDEX: 23.49 KG/M2 | RESPIRATION RATE: 12 BRPM | DIASTOLIC BLOOD PRESSURE: 60 MMHG | TEMPERATURE: 98.1 F | OXYGEN SATURATION: 91 % | WEIGHT: 183 LBS | SYSTOLIC BLOOD PRESSURE: 92 MMHG | HEIGHT: 74 IN

## 2025-03-28 DIAGNOSIS — S01.81XD FOREHEAD LACERATION, SUBSEQUENT ENCOUNTER: ICD-10-CM

## 2025-03-28 DIAGNOSIS — Z48.02 VISIT FOR SUTURE REMOVAL: ICD-10-CM

## 2025-03-28 PROCEDURE — 3078F DIAST BP <80 MM HG: CPT

## 2025-03-28 PROCEDURE — 99213 OFFICE O/P EST LOW 20 MIN: CPT

## 2025-03-28 PROCEDURE — 3074F SYST BP LT 130 MM HG: CPT

## 2025-03-28 RX ORDER — DOXYCYCLINE HYCLATE 100 MG
100 TABLET ORAL 2 TIMES DAILY
Qty: 10 TABLET | Refills: 0 | Status: SHIPPED | OUTPATIENT
Start: 2025-03-28 | End: 2025-04-02

## 2025-03-28 ASSESSMENT — ENCOUNTER SYMPTOMS
COUGH: 0
DIARRHEA: 0
NAUSEA: 0
HEADACHES: 0
VOMITING: 0
SHORTNESS OF BREATH: 0
WHEEZING: 0
ABDOMINAL PAIN: 0
WEAKNESS: 0
FEVER: 0
DIZZINESS: 0
CHILLS: 0
PALPITATIONS: 0

## 2025-03-28 ASSESSMENT — FIBROSIS 4 INDEX: FIB4 SCORE: 3.25

## 2025-03-28 NOTE — PROGRESS NOTES
"Subjective     Johann Godoy is a 74 y.o. male who presents with Suture / Staple Removal (Right side forehead on eyebrow)            Suture / Staple Removal  The sutures were placed 7 to 10 days ago. He tried nothing since the wound repair. There has been no drainage from the wound. There is no redness present. There is no swelling present. There is no pain present. He has no difficulty moving the affected extremity or digit.       Review of Systems   Constitutional:  Negative for chills and fever.   Respiratory:  Negative for cough, shortness of breath and wheezing.    Cardiovascular:  Negative for chest pain and palpitations.   Gastrointestinal:  Negative for abdominal pain, diarrhea, nausea and vomiting.   Neurological:  Negative for dizziness, weakness and headaches.              Objective     BP 92/60   Pulse 87   Temp 36.7 °C (98.1 °F) (Temporal)   Resp 12   Ht 1.88 m (6' 2\")   Wt 83 kg (183 lb)   SpO2 91%   BMI 23.50 kg/m²      Physical Exam  Constitutional:       General: He is not in acute distress.     Appearance: Normal appearance. He is normal weight.   HENT:      Head: Normocephalic and atraumatic.        Comments: 1.5 cm laceration with dried scab.  Had to remove to visualize sutures for removal.  Yellow eschar under scab with pink superficial granulation tissue.       Right Ear: Tympanic membrane, ear canal and external ear normal.      Left Ear: Tympanic membrane, ear canal and external ear normal.      Nose: Nose normal. No congestion or rhinorrhea.      Mouth/Throat:      Mouth: Mucous membranes are moist.      Pharynx: No posterior oropharyngeal erythema.   Eyes:      Extraocular Movements: Extraocular movements intact.      Pupils: Pupils are equal, round, and reactive to light.   Cardiovascular:      Rate and Rhythm: Normal rate and regular rhythm.   Pulmonary:      Effort: Pulmonary effort is normal. No respiratory distress.      Breath sounds: Normal breath sounds. No stridor. No " wheezing or rhonchi.   Musculoskeletal:         General: Normal range of motion.      Cervical back: Normal range of motion and neck supple.   Lymphadenopathy:      Cervical: No cervical adenopathy.   Skin:     General: Skin is warm and dry.   Neurological:      General: No focal deficit present.      Mental Status: He is alert and oriented to person, place, and time.                     Assessment & Plan  Forehead laceration, subsequent encounter    Orders:    doxycycline (VIBRAMYCIN) 100 MG Tab; Take 1 Tablet by mouth 2 times a day for 5 days.    Visit for suture removal    Orders:    doxycycline (VIBRAMYCIN) 100 MG Tab; Take 1 Tablet by mouth 2 times a day for 5 days.       This is an acute condition.  Previous visits, pmhx, medication, and VS reviewed.  Patient not acutely ill appearing or in acute distress. VSS.     Patient received care at [facility] on 3/28/2025 status post fall.  Patient was walking and tripped on the pavement hitting his head with unknown LOC per patient's son, who is present at time of appointment.  Initial CT head revealing subarachnoid hemorrhage, that was stable and follow-up CT, for which she was discharged home after receiving care for forehead laceration.  Patient returns to urgent care today for suture removal.  Right lateral forehead laceration open to air with dried scab covering for sutures.  Scab carefully removed to visualize 4 sutures.  Sutures removed, but concern for superficial laceration approximation/adequate wound healing.  Some yellow eschar without drainage, edema, or erythema concerning for infection.  3 Steri-Strips placed to continue to approximate and promote healing of superficial laceration.  Wound cleaned with iodine prior to loose Steris placed.  Wound coated with Polysporin and covered with Band-Aid.  Educated patient and son to change Band-Aid daily, and encourage patient's assisted living facility staff to monitor laceration site daily for increased  redness, drainage, or swelling.  Also educated that Steri-Strips will fall off on own, and to shower tomorrow allowing water to drain over site and pat dry.  Keep wound clean.  Once Steri-Strips fall off he may keep wound open to air.  Also educated patient and son that a prescription for an oral antibiotic will be prescribed in case wound appears to be getting increasingly more red or draining.  Take antibiotic with food.  Return to urgent care if wound is not improving on antibiotic.  ER for severe symptoms.      Patient and son understand and are agreeable to treatment plan.  Denies further questions.       Please note that this dictation was created using voice recognition software. I have made every reasonable attempt to correct obvious errors, but I expect that there are errors of grammar and possibly content that I did not discover before finalizing the note.

## 2025-04-02 ENCOUNTER — TELEPHONE (OUTPATIENT)
Dept: NEUROLOGY | Facility: MEDICAL CENTER | Age: 75
End: 2025-04-02
Payer: MEDICARE

## 2025-04-17 PROBLEM — Z91.81 AT RISK FOR FALLING: Status: ACTIVE | Noted: 2025-04-17

## 2025-04-24 ENCOUNTER — HOSPITAL ENCOUNTER (OUTPATIENT)
Facility: MEDICAL CENTER | Age: 75
End: 2025-04-24
Payer: MEDICARE

## 2025-04-24 DIAGNOSIS — R39.9 UTI SYMPTOMS: ICD-10-CM

## 2025-04-24 LAB
APPEARANCE UR: CLEAR
BACTERIA #/AREA URNS HPF: ABNORMAL /HPF
BILIRUB UR QL STRIP.AUTO: NEGATIVE
CASTS URNS QL MICRO: ABNORMAL /LPF (ref 0–2)
COLOR UR: YELLOW
EPITHELIAL CELLS 1715: ABNORMAL /HPF (ref 0–5)
GLUCOSE UR STRIP.AUTO-MCNC: NEGATIVE MG/DL
KETONES UR STRIP.AUTO-MCNC: NEGATIVE MG/DL
LEUKOCYTE ESTERASE UR QL STRIP.AUTO: ABNORMAL
MICRO URNS: ABNORMAL
NITRITE UR QL STRIP.AUTO: NEGATIVE
PH UR STRIP.AUTO: 6.5 [PH] (ref 5–8)
PROT UR QL STRIP: NEGATIVE MG/DL
RBC # URNS HPF: ABNORMAL /HPF (ref 0–2)
RBC UR QL AUTO: NEGATIVE
SP GR UR STRIP.AUTO: 1.02
UROBILINOGEN UR STRIP.AUTO-MCNC: 2 EU/DL
WBC #/AREA URNS HPF: ABNORMAL /HPF

## 2025-04-24 PROCEDURE — 81001 URINALYSIS AUTO W/SCOPE: CPT

## 2025-08-08 ENCOUNTER — HOSPITAL ENCOUNTER (OUTPATIENT)
Dept: LAB | Facility: MEDICAL CENTER | Age: 75
End: 2025-08-08
Payer: MEDICARE

## 2025-08-08 DIAGNOSIS — E55.9 VITAMIN D DEFICIENCY: ICD-10-CM

## 2025-08-08 DIAGNOSIS — I10 PRIMARY HYPERTENSION: ICD-10-CM

## 2025-08-08 DIAGNOSIS — Z13.9 SCREENING DUE: ICD-10-CM

## 2025-08-08 DIAGNOSIS — E78.5 HYPERLIPIDEMIA WITH LOW HDL: ICD-10-CM

## 2025-08-08 DIAGNOSIS — N18.2 STAGE 2 CHRONIC KIDNEY DISEASE: ICD-10-CM

## 2025-08-08 DIAGNOSIS — R39.9 UTI SYMPTOMS: ICD-10-CM

## 2025-08-08 DIAGNOSIS — E78.6 HYPERLIPIDEMIA WITH LOW HDL: ICD-10-CM

## 2025-08-08 DIAGNOSIS — R73.01 IMPAIRED FASTING GLUCOSE: ICD-10-CM

## 2025-08-08 LAB
25(OH)D3 SERPL-MCNC: 114 NG/ML (ref 30–100)
ALBUMIN SERPL BCP-MCNC: 4.2 G/DL (ref 3.2–4.9)
ALBUMIN/GLOB SERPL: 1.6 G/DL
ALP SERPL-CCNC: 62 U/L (ref 30–99)
ALT SERPL-CCNC: 11 U/L (ref 2–50)
ANION GAP SERPL CALC-SCNC: 11 MMOL/L (ref 7–16)
AST SERPL-CCNC: 19 U/L (ref 12–45)
BASOPHILS # BLD AUTO: 0.7 % (ref 0–1.8)
BASOPHILS # BLD: 0.06 K/UL (ref 0–0.12)
BILIRUB SERPL-MCNC: 0.9 MG/DL (ref 0.1–1.5)
BUN SERPL-MCNC: 15 MG/DL (ref 8–22)
CALCIUM ALBUM COR SERPL-MCNC: 9.6 MG/DL (ref 8.5–10.5)
CALCIUM SERPL-MCNC: 9.8 MG/DL (ref 8.5–10.5)
CHLORIDE SERPL-SCNC: 102 MMOL/L (ref 96–112)
CHOLEST SERPL-MCNC: 122 MG/DL (ref 100–199)
CO2 SERPL-SCNC: 26 MMOL/L (ref 20–33)
CREAT SERPL-MCNC: 1.24 MG/DL (ref 0.5–1.4)
EOSINOPHIL # BLD AUTO: 0.15 K/UL (ref 0–0.51)
EOSINOPHIL NFR BLD: 1.9 % (ref 0–6.9)
ERYTHROCYTE [DISTWIDTH] IN BLOOD BY AUTOMATED COUNT: 45.9 FL (ref 35.9–50)
EST. AVERAGE GLUCOSE BLD GHB EST-MCNC: 114 MG/DL
FASTING STATUS PATIENT QL REPORTED: NORMAL
GFR SERPLBLD CREATININE-BSD FMLA CKD-EPI: 60 ML/MIN/1.73 M 2
GLOBULIN SER CALC-MCNC: 2.6 G/DL (ref 1.9–3.5)
GLUCOSE SERPL-MCNC: 109 MG/DL (ref 65–99)
HBA1C MFR BLD: 5.6 % (ref 4–5.6)
HCT VFR BLD AUTO: 44.9 % (ref 42–52)
HDLC SERPL-MCNC: 47 MG/DL
HGB BLD-MCNC: 14.6 G/DL (ref 14–18)
IMM GRANULOCYTES # BLD AUTO: 0.02 K/UL (ref 0–0.11)
IMM GRANULOCYTES NFR BLD AUTO: 0.2 % (ref 0–0.9)
LDLC SERPL CALC-MCNC: 57 MG/DL
LYMPHOCYTES # BLD AUTO: 1.75 K/UL (ref 1–4.8)
LYMPHOCYTES NFR BLD: 21.7 % (ref 22–41)
MCH RBC QN AUTO: 30.4 PG (ref 27–33)
MCHC RBC AUTO-ENTMCNC: 32.5 G/DL (ref 32.3–36.5)
MCV RBC AUTO: 93.5 FL (ref 81.4–97.8)
MONOCYTES # BLD AUTO: 0.51 K/UL (ref 0–0.85)
MONOCYTES NFR BLD AUTO: 6.3 % (ref 0–13.4)
NEUTROPHILS # BLD AUTO: 5.56 K/UL (ref 1.82–7.42)
NEUTROPHILS NFR BLD: 69.2 % (ref 44–72)
NRBC # BLD AUTO: 0 K/UL
NRBC BLD-RTO: 0 /100 WBC (ref 0–0.2)
PLATELET # BLD AUTO: 199 K/UL (ref 164–446)
PMV BLD AUTO: 12.7 FL (ref 9–12.9)
POTASSIUM SERPL-SCNC: 4.6 MMOL/L (ref 3.6–5.5)
PROT SERPL-MCNC: 6.8 G/DL (ref 6–8.2)
RBC # BLD AUTO: 4.8 M/UL (ref 4.7–6.1)
SODIUM SERPL-SCNC: 139 MMOL/L (ref 135–145)
TRIGL SERPL-MCNC: 90 MG/DL (ref 0–149)
TSH SERPL DL<=0.005 MIU/L-ACNC: 1.77 UIU/ML (ref 0.38–5.33)
VIT B12 SERPL-MCNC: 203 PG/ML (ref 211–911)
WBC # BLD AUTO: 8.1 K/UL (ref 4.8–10.8)

## 2025-08-08 PROCEDURE — 36415 COLL VENOUS BLD VENIPUNCTURE: CPT

## 2025-08-08 PROCEDURE — 82607 VITAMIN B-12: CPT

## 2025-08-08 PROCEDURE — 80061 LIPID PANEL: CPT

## 2025-08-08 PROCEDURE — 85025 COMPLETE CBC W/AUTO DIFF WBC: CPT

## 2025-08-08 PROCEDURE — 80053 COMPREHEN METABOLIC PANEL: CPT

## 2025-08-08 PROCEDURE — 82306 VITAMIN D 25 HYDROXY: CPT

## 2025-08-08 PROCEDURE — 84443 ASSAY THYROID STIM HORMONE: CPT

## 2025-08-08 PROCEDURE — 83036 HEMOGLOBIN GLYCOSYLATED A1C: CPT | Mod: GZ

## 2025-08-14 PROBLEM — R41.89 COGNITIVE AND BEHAVIORAL CHANGES: Status: ACTIVE | Noted: 2025-08-14

## 2025-08-14 PROBLEM — E67.3 HIGH VITAMIN D LEVEL: Status: ACTIVE | Noted: 2025-08-14

## 2025-08-14 PROBLEM — E53.8 VITAMIN B12 DEFICIENCY: Status: ACTIVE | Noted: 2025-08-14

## 2025-08-14 PROBLEM — R46.89 COGNITIVE AND BEHAVIORAL CHANGES: Status: ACTIVE | Noted: 2025-08-14
